# Patient Record
Sex: FEMALE | Race: WHITE | Employment: UNEMPLOYED | URBAN - METROPOLITAN AREA
[De-identification: names, ages, dates, MRNs, and addresses within clinical notes are randomized per-mention and may not be internally consistent; named-entity substitution may affect disease eponyms.]

---

## 2017-05-02 ENCOUNTER — HOSPITAL ENCOUNTER (OUTPATIENT)
Dept: OTHER | Age: 72
Discharge: HOME | End: 2017-05-02
Attending: FAMILY MEDICINE | Admitting: FAMILY MEDICINE

## 2017-05-02 VITALS
BODY MASS INDEX: 22.19 KG/M2 | HEIGHT: 60 IN | TEMPERATURE: 97 F | RESPIRATION RATE: 16 BRPM | DIASTOLIC BLOOD PRESSURE: 99 MMHG | HEART RATE: 67 BPM | OXYGEN SATURATION: 98 % | WEIGHT: 113 LBS | SYSTOLIC BLOOD PRESSURE: 236 MMHG

## 2017-05-02 LAB
A/G RATIO: 1.8 (ref 1.1–2.2)
ALBUMIN SERPL-MCNC: 4.4 G/DL (ref 3.4–5)
ALP BLD-CCNC: 132 U/L (ref 40–129)
ALT SERPL-CCNC: 10 U/L (ref 10–40)
ANION GAP SERPL CALCULATED.3IONS-SCNC: 12 MMOL/L (ref 3–16)
AST SERPL-CCNC: 18 U/L (ref 15–37)
BILIRUB SERPL-MCNC: 0.8 MG/DL (ref 0–1)
BUN BLDV-MCNC: 9 MG/DL (ref 7–20)
CALCIUM SERPL-MCNC: 9.2 MG/DL (ref 8.3–10.6)
CHLORIDE BLD-SCNC: 101 MMOL/L (ref 99–110)
CO2: 30 MMOL/L (ref 21–32)
CREAT SERPL-MCNC: 0.8 MG/DL (ref 0.6–1.2)
GFR AFRICAN AMERICAN: >60
GFR NON-AFRICAN AMERICAN: >60
GLOBULIN: 2.4 G/DL
GLUCOSE BLD-MCNC: 112 MG/DL (ref 70–99)
POTASSIUM SERPL-SCNC: 3.5 MMOL/L (ref 3.5–5.1)
SODIUM BLD-SCNC: 143 MMOL/L (ref 136–145)
TOTAL PROTEIN: 6.8 G/DL (ref 6.4–8.2)

## 2017-05-02 ASSESSMENT — PAIN - FUNCTIONAL ASSESSMENT: PAIN_FUNCTIONAL_ASSESSMENT: 0-10

## 2017-08-15 ENCOUNTER — HOSPITAL ENCOUNTER (OUTPATIENT)
Dept: OTHER | Age: 72
Discharge: HOME | End: 2017-08-15
Attending: INTERNAL MEDICINE | Admitting: INTERNAL MEDICINE

## 2017-08-15 VITALS — HEIGHT: 60 IN | WEIGHT: 112 LBS | BODY MASS INDEX: 21.99 KG/M2

## 2017-08-15 DIAGNOSIS — C82.18 FOLLICULAR LYMPHOMA GRADE II OF LYMPH NODES OF MULTIPLE SITES (HCC): ICD-10-CM

## 2017-08-15 RX ORDER — FLUDEOXYGLUCOSE F 18 200 MCI/ML
15.65 INJECTION, SOLUTION INTRAVENOUS
Status: COMPLETED | OUTPATIENT
Start: 2017-08-15 | End: 2017-08-15

## 2017-08-15 RX ADMIN — FLUDEOXYGLUCOSE F 18 15.65 MILLICURIE: 200 INJECTION, SOLUTION INTRAVENOUS at 11:00

## 2018-03-27 ENCOUNTER — HOSPITAL ENCOUNTER (OUTPATIENT)
Dept: OTHER | Age: 73
Discharge: HOME | End: 2018-03-27
Attending: FAMILY MEDICINE | Admitting: FAMILY MEDICINE

## 2018-03-27 VITALS
BODY MASS INDEX: 22.19 KG/M2 | SYSTOLIC BLOOD PRESSURE: 203 MMHG | RESPIRATION RATE: 16 BRPM | WEIGHT: 113 LBS | DIASTOLIC BLOOD PRESSURE: 100 MMHG | HEART RATE: 66 BPM | HEIGHT: 60 IN

## 2018-03-27 LAB
FOLATE: >20 NG/ML (ref 4.78–24.2)
IRON SATURATION: 38 % (ref 15–50)
IRON: 92 UG/DL (ref 37–145)
TOTAL IRON BINDING CAPACITY: 240 UG/DL (ref 260–445)
TSH SERPL DL<=0.05 MIU/L-ACNC: 1.81 UIU/ML (ref 0.27–4.2)
VITAMIN B-12: 308 PG/ML (ref 211–911)
VITAMIN D 25-HYDROXY: 15.7 NG/ML

## 2018-03-27 ASSESSMENT — PAIN SCALES - GENERAL: PAINLEVEL_OUTOF10: 0

## 2018-03-27 NOTE — PROGRESS NOTES
Pt here for blood draw from portacath No c/o's voiced Portacath left chest accessed with 20 ga 1 in phelps needle Excellent blood return obtained 10 cc blood wasted then blood for lab work drawn [employer] flushed with 20 cc NS using start stop method then port de accessed Dressing applied to site Site unremarkable Pt iker well  Discharge instructions reviewed with pt and understanding verbalized then pt discharged to home in stable condition

## 2018-12-05 ENCOUNTER — HOSPITAL ENCOUNTER (OUTPATIENT)
Age: 73
Discharge: HOME | End: 2018-12-05
Payer: MEDICARE

## 2018-12-05 LAB
A/G RATIO: 1.3 (ref 1.1–2.2)
ALBUMIN SERPL-MCNC: 4 G/DL (ref 3.4–5)
ALP BLD-CCNC: 112 U/L (ref 40–129)
ALT SERPL-CCNC: 10 U/L (ref 10–40)
ANION GAP SERPL CALCULATED.3IONS-SCNC: 13 MMOL/L (ref 3–16)
AST SERPL-CCNC: 17 U/L (ref 15–37)
BASOPHILS ABSOLUTE: 0 K/UL (ref 0–0.2)
BASOPHILS RELATIVE PERCENT: 0.6 %
BILIRUB SERPL-MCNC: 0.5 MG/DL (ref 0–1)
BUN BLDV-MCNC: 10 MG/DL (ref 7–20)
CALCIUM SERPL-MCNC: 9 MG/DL (ref 8.3–10.6)
CHLORIDE BLD-SCNC: 103 MMOL/L (ref 99–110)
CO2: 28 MMOL/L (ref 21–32)
CREAT SERPL-MCNC: 0.9 MG/DL (ref 0.6–1.2)
EOSINOPHILS ABSOLUTE: 0.1 K/UL (ref 0–0.6)
EOSINOPHILS RELATIVE PERCENT: 2.6 %
GFR AFRICAN AMERICAN: >60
GFR NON-AFRICAN AMERICAN: >60
GLOBULIN: 3 G/DL
GLUCOSE BLD-MCNC: 90 MG/DL (ref 70–99)
HCT VFR BLD CALC: 38.8 % (ref 36–48)
HEMOGLOBIN: 13.4 G/DL (ref 12–16)
LYMPHOCYTES ABSOLUTE: 1.2 K/UL (ref 1–5.1)
LYMPHOCYTES RELATIVE PERCENT: 23.4 %
MCH RBC QN AUTO: 32.7 PG (ref 26–34)
MCHC RBC AUTO-ENTMCNC: 34.5 G/DL (ref 31–36)
MCV RBC AUTO: 94.8 FL (ref 80–100)
MONOCYTES ABSOLUTE: 0.5 K/UL (ref 0–1.3)
MONOCYTES RELATIVE PERCENT: 10 %
NEUTROPHILS ABSOLUTE: 3.4 K/UL (ref 1.7–7.7)
NEUTROPHILS RELATIVE PERCENT: 63.4 %
PDW BLD-RTO: 13.3 % (ref 12.4–15.4)
PLATELET # BLD: 113 K/UL (ref 135–450)
PMV BLD AUTO: 9.7 FL (ref 5–10.5)
POTASSIUM SERPL-SCNC: 3.3 MMOL/L (ref 3.5–5.1)
RBC # BLD: 4.09 M/UL (ref 4–5.2)
SODIUM BLD-SCNC: 144 MMOL/L (ref 136–145)
TOTAL PROTEIN: 7 G/DL (ref 6.4–8.2)
WBC # BLD: 5.3 K/UL (ref 4–11)

## 2018-12-05 PROCEDURE — 36415 COLL VENOUS BLD VENIPUNCTURE: CPT

## 2018-12-05 PROCEDURE — 84443 ASSAY THYROID STIM HORMONE: CPT

## 2018-12-05 PROCEDURE — 85025 COMPLETE CBC W/AUTO DIFF WBC: CPT

## 2018-12-05 PROCEDURE — 80053 COMPREHEN METABOLIC PANEL: CPT

## 2018-12-06 LAB — TSH SERPL DL<=0.05 MIU/L-ACNC: 2.06 UIU/ML (ref 0.27–4.2)

## 2018-12-17 ENCOUNTER — APPOINTMENT (OUTPATIENT)
Dept: CT IMAGING | Age: 73
End: 2018-12-17
Payer: MEDICARE

## 2018-12-17 ENCOUNTER — APPOINTMENT (OUTPATIENT)
Dept: GENERAL RADIOLOGY | Age: 73
End: 2018-12-17
Payer: MEDICARE

## 2018-12-17 ENCOUNTER — HOSPITAL ENCOUNTER (EMERGENCY)
Age: 73
Discharge: HOME | End: 2018-12-17
Payer: MEDICARE

## 2018-12-17 VITALS
WEIGHT: 114 LBS | DIASTOLIC BLOOD PRESSURE: 107 MMHG | TEMPERATURE: 98.6 F | HEART RATE: 76 BPM | SYSTOLIC BLOOD PRESSURE: 228 MMHG | RESPIRATION RATE: 16 BRPM | OXYGEN SATURATION: 97 % | HEIGHT: 60 IN | BODY MASS INDEX: 22.38 KG/M2

## 2018-12-17 DIAGNOSIS — S80.01XA CONTUSION OF RIGHT KNEE, INITIAL ENCOUNTER: ICD-10-CM

## 2018-12-17 DIAGNOSIS — S09.90XA INJURY OF HEAD, INITIAL ENCOUNTER: ICD-10-CM

## 2018-12-17 DIAGNOSIS — S00.83XA CONTUSION OF FACE, INITIAL ENCOUNTER: ICD-10-CM

## 2018-12-17 DIAGNOSIS — I10 CHRONIC HYPERTENSION: ICD-10-CM

## 2018-12-17 DIAGNOSIS — S02.2XXA CLOSED FRACTURE OF NASAL BONE, INITIAL ENCOUNTER: ICD-10-CM
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
## 2018-12-17 DIAGNOSIS — S60.221A CONTUSION OF RIGHT HAND, INITIAL ENCOUNTER: ICD-10-CM

## 2018-12-17 PROCEDURE — 73130 X-RAY EXAM OF HAND: CPT

## 2018-12-17 PROCEDURE — 99284 EMERGENCY DEPT VISIT MOD MDM: CPT

## 2018-12-17 PROCEDURE — 70486 CT MAXILLOFACIAL W/O DYE: CPT

## 2018-12-17 PROCEDURE — 73560 X-RAY EXAM OF KNEE 1 OR 2: CPT

## 2018-12-17 PROCEDURE — 72125 CT NECK SPINE W/O DYE: CPT

## 2018-12-17 PROCEDURE — 70450 CT HEAD/BRAIN W/O DYE: CPT

## 2018-12-17 RX ORDER — ACETAMINOPHEN 325 MG/1
650 TABLET ORAL ONCE
Status: DISCONTINUED | OUTPATIENT
Start: 2018-12-17 | End: 2018-12-17 | Stop reason: HOSPADM

## 2018-12-17 ASSESSMENT — PAIN DESCRIPTION - LOCATION: LOCATION: WRIST

## 2018-12-17 ASSESSMENT — PAIN SCALES - GENERAL
PAINLEVEL_OUTOF10: 6
PAINLEVEL_OUTOF10: 6

## 2018-12-17 ASSESSMENT — ENCOUNTER SYMPTOMS
ABDOMINAL PAIN: 0
BACK PAIN: 0
SHORTNESS OF BREATH: 0
VOMITING: 0
NAUSEA: 0
FACIAL SWELLING: 1

## 2018-12-17 ASSESSMENT — PAIN DESCRIPTION - PROGRESSION: CLINICAL_PROGRESSION: NOT CHANGED

## 2018-12-17 ASSESSMENT — PAIN DESCRIPTION - DESCRIPTORS: DESCRIPTORS: CONSTANT

## 2018-12-17 ASSESSMENT — PAIN DESCRIPTION - ONSET: ONSET: ON-GOING

## 2018-12-17 ASSESSMENT — PAIN DESCRIPTION - FREQUENCY: FREQUENCY: CONTINUOUS

## 2018-12-17 ASSESSMENT — PAIN DESCRIPTION - PAIN TYPE: TYPE: ACUTE PAIN

## 2018-12-17 ASSESSMENT — PAIN DESCRIPTION - ORIENTATION: ORIENTATION: RIGHT

## 2018-12-17 NOTE — ED PROVIDER NOTES
Vitals [12/17/18 1418]   BP Temp Temp Source Pulse Resp SpO2 Height Weight   (!) 220/104 97.8 °F (36.6 °C) Oral 66 16 98 % 5' (1.524 m) 114 lb (51.7 kg)       Physical Exam    CONSTITUTIONAL: Awake and alert. Cooperative. Well-developed. Well-nourished. Non-toxic. No acute distress. HENT: Normocephalic. Periorbital bruising and some bruising across the bridge of the nose with slight tenderness to palpate. No bony step-off or crepitus. External ears normal, without discharge. TMs clear bilaterally. No hemotympanum No nasal discharge. Slight dry blood in right nare. No septal hematoma. Oropharynx clear. Mucous membranes moist.  EYES: Conjunctiva non-injected. No scleral icterus. PERRL. EOM's grossly intact. NECK: Supple. Normal ROM. Mild pain to the paravertebral muscles of the C-spine. CARDIOVASCULAR: RRR. No Murmer. Intact distal pulses. PULMONARY/CHEST WALL:Effort normal. No tachypnea. Lungs clear to ausculation. ABDOMEN: Normal BS. Soft. Nondistended. No tenderness to palpate. No guarding. /ANORECTAL: Not assessed  MUSKULOSKELETAL: Right hand: Mild swelling of the dorsum of the hand over the third, fourth and fifth carpals with associated tenderness to palpate and some bruising. Normal ROM. No acute deformities. No edema. Right knee: Mild tenderness anteriorly. No significant swelling or joint effusion. Good ROM. No acute deformities. SKIN: Warm and dry. NEUROLOGICAL: Alert and oriented x 3. GCS 15. CN II-XII grossly intact. Strength is 5/5 in all extremities and sensation is intact. Normal gait. PSYCHIATRIC: Normal affect        DIAGNOSTICRESULTS:       RADIOLOGY:  All x-ray studies areviewed/reviewed by me.   Formal interpretations per the radiologist are as follows:      Xr Hand Right (min 3 Views)    Result Date: 12/17/2018  EXAMINATION: 3 XRAY VIEWS OF THE RIGHT HAND; 2 XRAY VIEWS OF THE RIGHT KNEE 12/17/2018 4:15 pm COMPARISON: Right knee plain radiographs from 05/29/2016 HISTORY: surrounding the nose with suspected left nasal bone fracture. Infraorbital soft tissue swelling. No acute orbital fracture. Ct Facial Bones Wo Contrast    Result Date: 12/17/2018  EXAMINATION: CT OF THE HEAD WITHOUT CONTRAST; CT OF THE CERVICAL SPINE WITHOUT CONTRAST; CT OF THE FACE WITHOUT CONTRAST  12/17/2018 4:56 pm; 12/17/2018 4:57 pm TECHNIQUE: CT of the head was performed without the administration of intravenous contrast. Dose modulation, iterative reconstruction, and/or weight based adjustment of the mA/kV was utilized to reduce the radiation dose to as low as reasonably achievable.; CT of the cervical spine was performed without the administration of intravenous contrast. Multiplanar reformatted images are provided for review. Dose modulation, iterative reconstruction, and/or weight based adjustment of the mA/kV was utilized to reduce the radiation dose to as low as reasonably achievable.; CT of the face was performed without the administration of intravenous contrast. Multiplanar reformatted images are provided for review. Dose modulation, iterative reconstruction, and/or weight based adjustment of the mA/kV was utilized to reduce the radiation dose to as low as reasonably achievable. COMPARISON: CT head and cervical spine May 29, 2016. HISTORY: ORDERING SYSTEM PROVIDED HISTORY: fall, head injury TECHNOLOGIST PROVIDED HISTORY: Has a \"code stroke\" or \"stroke alert\" been called? ->No Ordering Physician Provided Reason for Exam: fall, pt fell face first, no LOC, headache Acuity: Acute Type of Exam: Initial; ORDERING SYSTEM PROVIDED HISTORY: fall, facial injury TECHNOLOGIST PROVIDED HISTORY: Ordering Physician Provided Reason for Exam: FALL - LACERATION TO BRIDGE OF NOSE FROM GLASSES, BILATERAL BRUISED EYES Acuity: Acute Type of Exam: Initial FINDINGS: CT HEAD: BRAIN/VENTRICLES: No acute intracranial hemorrhage. No mass effect. No midline shift.   Moderate prominence of the ventricles and sulci is of intravenous contrast. Multiplanar reformatted images are provided for review. Dose modulation, iterative reconstruction, and/or weight based adjustment of the mA/kV was utilized to reduce the radiation dose to as low as reasonably achievable.; CT of the face was performed without the administration of intravenous contrast. Multiplanar reformatted images are provided for review. Dose modulation, iterative reconstruction, and/or weight based adjustment of the mA/kV was utilized to reduce the radiation dose to as low as reasonably achievable. COMPARISON: CT head and cervical spine May 29, 2016. HISTORY: ORDERING SYSTEM PROVIDED HISTORY: fall, head injury TECHNOLOGIST PROVIDED HISTORY: Has a \"code stroke\" or \"stroke alert\" been called? ->No Ordering Physician Provided Reason for Exam: fall, pt fell face first, no LOC, headache Acuity: Acute Type of Exam: Initial; ORDERING SYSTEM PROVIDED HISTORY: fall, facial injury TECHNOLOGIST PROVIDED HISTORY: Ordering Physician Provided Reason for Exam: FALL - LACERATION TO BRIDGE OF NOSE FROM GLASSES, BILATERAL BRUISED EYES Acuity: Acute Type of Exam: Initial FINDINGS: CT HEAD: BRAIN/VENTRICLES: No acute intracranial hemorrhage. No mass effect. No midline shift. Moderate prominence of the ventricles and sulci is consistent with atrophy. Moderate periventricular and subcortical white matter hypodensities are nonspecific but likely represent microvascular disease. SOFT TISSUES/SKULL: No acute abnormality of the visualized skull or soft tissues. CT FACIAL BONES: FACIAL BONES: Mandible is intact. Mandibular condyles are appropriately located. No acute zygomatic arch fracture. Pterygoid plates are intact. No acute maxillary fracture. Left-sided nasal bone fracture is suspected. Associated soft tissue swelling is seen. ORBITS: The globes appear intact. The extraocular muscles, optic nerve sheath complexes and lacrimal glands appear unremarkable.   No retrobulbar hematoma or

## 2019-01-22 ENCOUNTER — HOSPITAL ENCOUNTER (OUTPATIENT)
Dept: PHYSICAL THERAPY | Age: 74
Setting detail: THERAPIES SERIES
Discharge: HOME | End: 2019-01-22
Payer: MEDICARE

## 2019-01-22 PROCEDURE — 97162 PT EVAL MOD COMPLEX 30 MIN: CPT

## 2019-01-22 PROCEDURE — 97112 NEUROMUSCULAR REEDUCATION: CPT

## 2019-01-23 ENCOUNTER — HOSPITAL ENCOUNTER (OUTPATIENT)
Dept: PHYSICAL THERAPY | Age: 74
Setting detail: THERAPIES SERIES
Discharge: HOME | End: 2019-01-23
Payer: MEDICARE

## 2019-01-25 ENCOUNTER — APPOINTMENT (OUTPATIENT)
Dept: PHYSICAL THERAPY | Age: 74
End: 2019-01-25
Payer: MEDICARE

## 2019-01-29 ENCOUNTER — APPOINTMENT (OUTPATIENT)
Dept: PHYSICAL THERAPY | Age: 74
End: 2019-01-29
Payer: MEDICARE

## 2019-04-01 ENCOUNTER — APPOINTMENT (OUTPATIENT)
Dept: GENERAL RADIOLOGY | Age: 74
DRG: 305 | End: 2019-04-01
Payer: MEDICARE

## 2019-04-01 ENCOUNTER — HOSPITAL ENCOUNTER (INPATIENT)
Age: 74
LOS: 2 days | Discharge: HOME | DRG: 305 | End: 2019-04-03
Attending: EMERGENCY MEDICINE | Admitting: FAMILY MEDICINE
Payer: MEDICARE

## 2019-04-01 DIAGNOSIS — R07.9 CHEST PAIN, UNSPECIFIED TYPE: Primary | ICD-10-CM

## 2019-04-01 LAB
A/G RATIO: 1.4 (ref 1.1–2.2)
ALBUMIN SERPL-MCNC: 3.6 G/DL (ref 3.4–5)
ALP BLD-CCNC: 109 U/L (ref 40–129)
ALT SERPL-CCNC: 9 U/L (ref 10–40)
ANION GAP SERPL CALCULATED.3IONS-SCNC: 11 MMOL/L (ref 3–16)
AST SERPL-CCNC: 19 U/L (ref 15–37)
BASOPHILS ABSOLUTE: 0.1 K/UL (ref 0–0.2)
BASOPHILS RELATIVE PERCENT: 1.2 %
BILIRUB SERPL-MCNC: 0.5 MG/DL (ref 0–1)
BUN BLDV-MCNC: 12 MG/DL (ref 7–20)
CALCIUM SERPL-MCNC: 8.3 MG/DL (ref 8.3–10.6)
CHLORIDE BLD-SCNC: 106 MMOL/L (ref 99–110)
CO2: 25 MMOL/L (ref 21–32)
CREAT SERPL-MCNC: 0.8 MG/DL (ref 0.6–1.2)
EKG ATRIAL RATE: 64 BPM
EKG DIAGNOSIS: NORMAL
EKG P AXIS: 51 DEGREES
EKG P-R INTERVAL: 198 MS
EKG Q-T INTERVAL: 456 MS
EKG QRS DURATION: 94 MS
EKG QTC CALCULATION (BAZETT): 470 MS
EKG R AXIS: -2 DEGREES
EKG T AXIS: 101 DEGREES
EKG VENTRICULAR RATE: 64 BPM
EOSINOPHILS ABSOLUTE: 0.1 K/UL (ref 0–0.6)
EOSINOPHILS RELATIVE PERCENT: 2.8 %
GFR AFRICAN AMERICAN: >60
GFR NON-AFRICAN AMERICAN: >60
GLOBULIN: 2.5 G/DL
GLUCOSE BLD-MCNC: 125 MG/DL (ref 70–99)
HCT VFR BLD CALC: 38.5 % (ref 36–48)
HEMOGLOBIN: 13.2 G/DL (ref 12–16)
LV EF: 58 %
LVEF MODALITY: NORMAL
LYMPHOCYTES ABSOLUTE: 1.2 K/UL (ref 1–5.1)
LYMPHOCYTES RELATIVE PERCENT: 27.2 %
MCH RBC QN AUTO: 32.4 PG (ref 26–34)
MCHC RBC AUTO-ENTMCNC: 34.3 G/DL (ref 31–36)
MCV RBC AUTO: 94.4 FL (ref 80–100)
MONOCYTES ABSOLUTE: 0.4 K/UL (ref 0–1.3)
MONOCYTES RELATIVE PERCENT: 10.3 %
NEUTROPHILS ABSOLUTE: 2.5 K/UL (ref 1.7–7.7)
NEUTROPHILS RELATIVE PERCENT: 58.5 %
PDW BLD-RTO: 13.8 % (ref 12.4–15.4)
PLATELET # BLD: 112 K/UL (ref 135–450)
PMV BLD AUTO: 9.6 FL (ref 5–10.5)
POTASSIUM SERPL-SCNC: 3.3 MMOL/L (ref 3.5–5.1)
PRO-BNP: 1371 PG/ML (ref 0–449)
RBC # BLD: 4.07 M/UL (ref 4–5.2)
SODIUM BLD-SCNC: 142 MMOL/L (ref 136–145)
TOTAL PROTEIN: 6.1 G/DL (ref 6.4–8.2)
TROPONIN: <0.01 NG/ML
TROPONIN: <0.01 NG/ML
WBC # BLD: 4.2 K/UL (ref 4–11)

## 2019-04-01 PROCEDURE — 71045 X-RAY EXAM CHEST 1 VIEW: CPT

## 2019-04-01 PROCEDURE — 36592 COLLECT BLOOD FROM PICC: CPT

## 2019-04-01 PROCEDURE — 83880 ASSAY OF NATRIURETIC PEPTIDE: CPT

## 2019-04-01 PROCEDURE — 6370000000 HC RX 637 (ALT 250 FOR IP): Performed by: STUDENT IN AN ORGANIZED HEALTH CARE EDUCATION/TRAINING PROGRAM

## 2019-04-01 PROCEDURE — 99291 CRITICAL CARE FIRST HOUR: CPT

## 2019-04-01 PROCEDURE — 85025 COMPLETE CBC W/AUTO DIFF WBC: CPT

## 2019-04-01 PROCEDURE — 80053 COMPREHEN METABOLIC PANEL: CPT

## 2019-04-01 PROCEDURE — 2580000003 HC RX 258: Performed by: FAMILY MEDICINE

## 2019-04-01 PROCEDURE — 6370000000 HC RX 637 (ALT 250 FOR IP): Performed by: FAMILY MEDICINE

## 2019-04-01 PROCEDURE — 36591 DRAW BLOOD OFF VENOUS DEVICE: CPT

## 2019-04-01 PROCEDURE — 99222 1ST HOSP IP/OBS MODERATE 55: CPT | Performed by: INTERNAL MEDICINE

## 2019-04-01 PROCEDURE — 93005 ELECTROCARDIOGRAM TRACING: CPT | Performed by: EMERGENCY MEDICINE

## 2019-04-01 PROCEDURE — 6370000000 HC RX 637 (ALT 250 FOR IP): Performed by: INTERNAL MEDICINE

## 2019-04-01 PROCEDURE — 93306 TTE W/DOPPLER COMPLETE: CPT

## 2019-04-01 PROCEDURE — 84484 ASSAY OF TROPONIN QUANT: CPT

## 2019-04-01 PROCEDURE — 93010 ELECTROCARDIOGRAM REPORT: CPT | Performed by: INTERNAL MEDICINE

## 2019-04-01 PROCEDURE — 1200000000 HC SEMI PRIVATE

## 2019-04-01 RX ORDER — PANTOPRAZOLE SODIUM 40 MG/1
40 TABLET, DELAYED RELEASE ORAL
Status: DISCONTINUED | OUTPATIENT
Start: 2019-04-01 | End: 2019-04-03 | Stop reason: HOSPADM

## 2019-04-01 RX ORDER — TIMOLOL MALEATE 5 MG/ML
1 SOLUTION/ DROPS OPHTHALMIC 2 TIMES DAILY
Status: DISCONTINUED | OUTPATIENT
Start: 2019-04-01 | End: 2019-04-03 | Stop reason: HOSPADM

## 2019-04-01 RX ORDER — MINOXIDIL 2.5 MG/1
2.5 TABLET ORAL 2 TIMES DAILY
Status: DISCONTINUED | OUTPATIENT
Start: 2019-04-01 | End: 2019-04-03 | Stop reason: HOSPADM

## 2019-04-01 RX ORDER — MECLIZINE HCL 12.5 MG/1
25 TABLET ORAL 3 TIMES DAILY PRN
Status: DISCONTINUED | OUTPATIENT
Start: 2019-04-01 | End: 2019-04-03 | Stop reason: HOSPADM

## 2019-04-01 RX ORDER — ASPIRIN 81 MG/1
81 TABLET, CHEWABLE ORAL DAILY
Status: DISCONTINUED | OUTPATIENT
Start: 2019-04-02 | End: 2019-04-03 | Stop reason: HOSPADM

## 2019-04-01 RX ORDER — NITROGLYCERIN 0.4 MG/1
0.4 TABLET SUBLINGUAL EVERY 5 MIN PRN
Status: DISCONTINUED | OUTPATIENT
Start: 2019-04-01 | End: 2019-04-03 | Stop reason: HOSPADM

## 2019-04-01 RX ORDER — POTASSIUM CHLORIDE 20 MEQ/1
40 TABLET, EXTENDED RELEASE ORAL ONCE
Status: DISCONTINUED | OUTPATIENT
Start: 2019-04-01 | End: 2019-04-03 | Stop reason: HOSPADM

## 2019-04-01 RX ORDER — ACETAMINOPHEN 325 MG/1
650 TABLET ORAL EVERY 4 HOURS PRN
Status: DISCONTINUED | OUTPATIENT
Start: 2019-04-01 | End: 2019-04-03 | Stop reason: HOSPADM

## 2019-04-01 RX ORDER — LOSARTAN POTASSIUM 25 MG/1
100 TABLET ORAL DAILY
Status: DISCONTINUED | OUTPATIENT
Start: 2019-04-01 | End: 2019-04-03 | Stop reason: HOSPADM

## 2019-04-01 RX ORDER — SODIUM CHLORIDE 0.9 % (FLUSH) 0.9 %
10 SYRINGE (ML) INJECTION PRN
Status: DISCONTINUED | OUTPATIENT
Start: 2019-04-01 | End: 2019-04-03 | Stop reason: HOSPADM

## 2019-04-01 RX ORDER — FLECAINIDE ACETATE 100 MG/1
50 TABLET ORAL 2 TIMES DAILY
Status: DISCONTINUED | OUTPATIENT
Start: 2019-04-01 | End: 2019-04-01

## 2019-04-01 RX ORDER — ONDANSETRON HYDROCHLORIDE 8 MG/1
4 TABLET, FILM COATED ORAL EVERY 8 HOURS PRN
Status: DISCONTINUED | OUTPATIENT
Start: 2019-04-01 | End: 2019-04-02

## 2019-04-01 RX ORDER — FLECAINIDE ACETATE 50 MG/1
75 TABLET ORAL 2 TIMES DAILY
Status: DISCONTINUED | OUTPATIENT
Start: 2019-04-01 | End: 2019-04-03 | Stop reason: HOSPADM

## 2019-04-01 RX ORDER — METOPROLOL SUCCINATE 25 MG/1
25 TABLET, EXTENDED RELEASE ORAL DAILY
Status: DISCONTINUED | OUTPATIENT
Start: 2019-04-01 | End: 2019-04-03 | Stop reason: HOSPADM

## 2019-04-01 RX ORDER — AMITRIPTYLINE HYDROCHLORIDE 10 MG/1
10 TABLET, FILM COATED ORAL NIGHTLY
Status: DISCONTINUED | OUTPATIENT
Start: 2019-04-01 | End: 2019-04-03 | Stop reason: HOSPADM

## 2019-04-01 RX ORDER — MEXILETINE HYDROCHLORIDE 150 MG/1
150 CAPSULE ORAL 3 TIMES DAILY
Status: DISCONTINUED | OUTPATIENT
Start: 2019-04-01 | End: 2019-04-02

## 2019-04-01 RX ORDER — SODIUM CHLORIDE 0.9 % (FLUSH) 0.9 %
10 SYRINGE (ML) INJECTION EVERY 12 HOURS SCHEDULED
Status: DISCONTINUED | OUTPATIENT
Start: 2019-04-01 | End: 2019-04-03 | Stop reason: HOSPADM

## 2019-04-01 RX ADMIN — FLECAINIDE ACETATE 75 MG: 50 TABLET ORAL at 21:50

## 2019-04-01 RX ADMIN — MINOXIDIL 2.5 MG: 2.5 TABLET ORAL at 21:54

## 2019-04-01 RX ADMIN — SODIUM CHLORIDE, PRESERVATIVE FREE 10 ML: 5 INJECTION INTRAVENOUS at 08:11

## 2019-04-01 RX ADMIN — FLECAINIDE ACETATE 50 MG: 100 TABLET ORAL at 08:10

## 2019-04-01 RX ADMIN — ACETAMINOPHEN 650 MG: 325 TABLET ORAL at 21:51

## 2019-04-01 RX ADMIN — MINOXIDIL 2.5 MG: 2.5 TABLET ORAL at 10:07

## 2019-04-01 RX ADMIN — LOSARTAN POTASSIUM 100 MG: 25 TABLET, FILM COATED ORAL at 08:10

## 2019-04-01 RX ADMIN — MEXILETINE HYDROCHLORIDE 150 MG: 150 CAPSULE ORAL at 21:51

## 2019-04-01 RX ADMIN — AMITRIPTYLINE HYDROCHLORIDE 10 MG: 10 TABLET, FILM COATED ORAL at 21:51

## 2019-04-01 RX ADMIN — PANTOPRAZOLE SODIUM 40 MG: 40 TABLET, DELAYED RELEASE ORAL at 08:10

## 2019-04-01 RX ADMIN — METOPROLOL SUCCINATE 25 MG: 25 TABLET, EXTENDED RELEASE ORAL at 08:10

## 2019-04-01 RX ADMIN — SODIUM CHLORIDE, PRESERVATIVE FREE 10 ML: 5 INJECTION INTRAVENOUS at 21:51

## 2019-04-01 ASSESSMENT — PAIN SCALES - GENERAL
PAINLEVEL_OUTOF10: 0
PAINLEVEL_OUTOF10: 6

## 2019-04-01 NOTE — ED TRIAGE NOTES
Pt c/o cp and SOB which started today at [employer], was trying to sing and felt like she couldn't get a breath. She felt it was her A Fib. Happened intermittently throughout day  And developed CP later in the evening which is shy she decided to come to ED., Hx afib.

## 2019-04-01 NOTE — PROGRESS NOTES
Patient admitted to room 351-1 from ED. Patient oriented to room, call light, bed rails, phone, lights and bathroom. Patient instructed about the schedule of the day including: vital sign frequency, lab draws, possible tests, frequency of MD and staff rounds, including RN/MD rounding together at bedside, daily weights, and I &O's. Patient instructed about prescribed diet, how to use 8MENU, and television. Bed frame alarm in place, patient aware of placement and reason. Telemetry box 7 in place, patient aware of placement and reason. Bed locked, in lowest position, side rails up 2/4, call light within reach. Will continue to monitor.

## 2019-04-01 NOTE — CARE COORDINATION
CASE MANAGEMENT INITIAL ASSESSMENT      Reviewed chart and met with patient today, re: Possible discharge needs. Explained Case Management role/services. yes    Family present:   Primary contact information: Tejal Kolb [phone]    Admit date/status: 4/1/19 IP  Diagnosis: Chest Pain    Insurance: 200 LeTV required for SNF - Y        3 night stay required - N    Living arrangements, Adls, care needs, prior to admission: lives in a one story house with  and drives. Independent in ADL's. Transportation: pt drives    [address] [place] [address] at home: Walker_X_Cane_X_RTS__ BSC__Shower Chair__  02__ HHN__ CPAP__  BiPap__  Hospital Bed__ W/C___ Other__________    Services in the home and/or outpatient, prior to admission: none    Dialysis Facility (if applicable) N/A  · Name:  · Address:  · Dialysis Schedule:  · Phone:  · Fax:    PT/OT recs: none seen    Hospital Exemption Notification (HEN): needed for snf, not initiated    Barriers to discharge: none    Plan/comments: Pt plans on discharging home with no needs from case management. If any needs should arise please notify.     ECOC on chart for MD signature

## 2019-04-01 NOTE — CONSULTS
[address] [address]                                  CONSULTATION    PATIENT NAME: Tiffany Pyle                      :        1945  MED REC NO:   [id number]                          ROOM:       0105  ACCOUNT NO:   [de-identified]                           ADMIT DATE: 2019  PROVIDER:     Earline Nageotte, MD    CONSULT DATE:  2019    REASON FOR CONSULTATION:  Chest pain, palpitations, hypertension. HISTORY OF PRESENT ILLNESS:  This 57-year-old female presented to the  hospital with complaints of chest pain. She was at rest when she had  the onset of chest pain, felt like a pressure sensation in the center of  her chest, radiated up into her left shoulder, arm and neck, lasted for  about an hour or so. It was not exertional, got worse when laid down  flat. She had some mild associated shortness of breath. Pain is now  gone. She has not had this before. PAST MEDICAL HISTORY:  1. Hypertension. She states her blood pressure has always been very  difficult to control. It is typical for her to have systolic blood  pressure over 200. She states she has been on multiple blood pressure  medicines, which she either is unable to tolerate or do not  significantly lower her blood pressure. In fact, she tells me that at  one point, she spent a week in the hospital with attempts to control her  blood pressure, which were unsuccessful. She states that her primary  care physician and primary cardiologist have told her that her blood  pressure is simply always going to be that high. 2.  Paroxysmal atrial fibrillation, for which she is on flecainide, but  no anticoagulation due to a high risk of bleed per the patient and her  cardiologist, Dr. Logan Galindo. SOCIAL HISTORY:  She is . Does not smoke. FAMILY HISTORY:  Positive for heart disease. REVIEW OF SYSTEMS:  She denies fevers, chills, cough.   No focal  neurologic symptoms. No headache. No visual changes. No recent GI or   bleeding. She complains of increased palpitations over the past  several days. She states it is consistent with her atrial fibrillation;  this is out of the ordinary for her. All other systems are negative  except as present illness. ALLERGIES:  See list in the chart, which I reviewed. MEDICATIONS:  See list in the chart, which I reviewed. PHYSICAL EXAMINATION:  VITAL SIGNS:  Blood pressure is 192/79, heart rate 62, respirations 15,  temperature 97.9. She is 98% saturated on room air. GENERAL:  Well-developed, well-nourished, white female in no acute  distress. HEENT:  Normocephalic, atraumatic. Oropharynx clear. Moist mucous  membranes. NECK:  Supple. CHEST:  Clear. CARDIAC:  Regular S1, S2. There is no S3 or S4 gallop. There is no  significant murmur. Jugular venous pressure is normal.  Carotids 2+,  symmetric, without bruit. ABDOMEN:  Soft, nontender. Positive bowel sounds. EXTREMITIES:  No cyanosis or edema. NEUROLOGIC:  Grossly nonfocal.  SKIN:  Warm, dry. PSYCHIATRIC:  Affect calm. DIAGNOSTIC DATA:  EKG:  Normal sinus rhythm. No acute ischemia or  injury pattern. Chest x-ray does not show pulmonary edema. LABORATORY DATA:  Troponin is less than 0.01. IMPRESSION:  1. Chest pain with both typical and atypical features. No objective  evidence of acute coronary syndrome. 2.  Hypertension, poorly controlled. The patient states this is her  baseline, see above for details. 3.  Paroxysmal atrial fibrillation, appears to be having increased  episodes. 4.  Palpitations due to paroxysmal atrial fibrillation. PLAN:  1. Increase flecainide to 75 mg twice daily. 2.  Lexiscan Myoview stress test.  3.  Echocardiogram.  4.  No anticoagulation as she has been deemed high bleed risk per the  patient and her cardiology notes from Dr. Khadra Flood.   5.  I discussed with the patient further attempts at titrating her blood  pressure medications; she said this has been unsuccessful in the past.   She is intolerant to many medications and she states many other  medications were just simply ineffective. She understands the risk of  her persistent hypertension. At this time, she states that she will  just follow up with her primary cardiologist and primary care physician  for further care in regards to this.         Bailee Navarro MD    D: 04/01/2019 9:56:02       T: 04/01/2019 13:14:00     /[email or web address]  Job#: [id number]     Doc#: [id number]    CC:

## 2019-04-01 NOTE — H&P
Inpatient Family Medicine Service   History & Physical        PCP: Mylene Jorge MD    Date of Admission: 4/1/2019    Date of Service: Pt seen/examined on 4/1/2019 and admitted to Inpatient with expected LOS greater than two midnights due to medical therapy. Chief Complaint:  Chest pain    History Of Present Illness:    Laurence Huertas is a 66 y.o. female who presented to [facility] with chest pain. Pt reports that she has been having chest pain intermittently since Thursday but that it worsened yesterday. Has a history of atrial fibrillation which has been well controlled for several years. Followed by Dr Natasha Dwyer in cardiology, most recently in December. Pain is localized to substernal and left chest regions with radiation up to her left neck. Feels like heaviness. Yesterday the chest discomfort worsened and patient was having some trouble with deep inspiration with some lightheadedness. While in the ED, EKG showed normal sinus rhythm with LVH and repolarization abnormality. Patient was not in atrial fibrillation. Troponin levels have been negative. Received nitroglycerin while in ED. Today, patient doing well. Denies any current chest but reports that she had a short episode of chest discomfort about 15 minutes prior to being evaluated. She report typically \"feeling yucky\" since being on chemo many years ago. Feels tired due to not sleeping last night. Reports that her BP typically runs in 220s.      Past Medical History:          Diagnosis Date    Abnormal LFT's     Arthritis     Atrial fibrillation ([address]     Follicular lymphoma (HCC)     Dr Phil Jacobson GERD (gastroesophageal reflux disease)     Glaucoma     Gout 11/2/2010    Hypertension     Liver disease     steatohepatitis    Migraine     Non-ulcer dyspepsia     egd neg 11/2011    Peripheral vascular disease ([address]     Thyroid disease        Past Surgical History:          Procedure Laterality Date    APPENDECTOMY      CATARACT REMOVAL      2007    CHOLECYSTECTOMY      2005    COLONOSCOPY  2/27/2015    polyp    ENDOSCOPY, COLON, DIAGNOSTIC  11/14/2011    bx stomach polyp    HYSTERECTOMY      LYMPH NODE BIOPSY      THYROIDECTOMY      76    TONSILLECTOMY      UPPER GASTROINTESTINAL ENDOSCOPY      UPPER GASTROINTESTINAL ENDOSCOPY  2/27/2015    gastric polyp       Medications Prior to Admission:      Prior to Admission medications    Medication Sig Start Date End Date Taking? Authorizing Provider   mexiletine (MEXITIL) 150 MG capsule Take 150 mg by mouth 3 times daily Pt unaware of correct dosage    Historical Provider, MD   ondansetron (ZOFRAN) 4 MG tablet Take 1 tablet by mouth every 8 hours as needed for Nausea or Vomiting 12/7/16   Olga Garcia MD   amitriptyline (ELAVIL) 10 MG tablet Take 10 mg by mouth nightly    Historical Provider, MD   minoxidil (LONITEN) 2.5 MG tablet Take 2.5 mg by mouth 2 times daily. Historical Provider, MD   meclizine (ANTIVERT) 25 MG tablet Take 25 mg by mouth 3 times daily as needed. Historical Provider, MD   minoxidil (LONITEN) 2.5 MG tablet Take 1 tablet by mouth 2 times daily for 90 days. 4/21/13 7/8/17  Jennifer Charles MD   metoprolol (TOPROL-XL) 50 MG XL tablet Take 25 mg by mouth daily. Historical Provider, MD   olmesartan (BENICAR) 40 MG tablet Take 40 mg by mouth daily. Historical Provider, MD   flecainide (TAMBOCOR) 50 MG tablet Take 50 mg by mouth 2 times daily. Historical Provider, MD   omeprazole (PRILOSEC) 20 MG capsule Take 40 mg by mouth daily. Historical Provider, MD   timolol (TIMOPTIC-XR) 0.5 % ophthalmic gel-forming Place 1 drop into both eyes 2 times daily. Historical Provider, MD   Not taking Mexitil    Allergies:  Latex; Ferric carboxymaltose; Aldactone [spironolactone]; Baclofen; Cardizem [diltiazem hcl]; Dilaudid [hydromorphone hcl]; Droperidol; Guanfacine hcl; Hydrochlorothiazide; Ibuprofen; Lasix [furosemide];  Lorazepam; Nexium [esomeprazole magnesium trihydrate]; Nortriptyline; Other; Phenergan [promethazine hcl]; Phenergan [promethazine hcl]; Phenothiazines; Pilocarpine; Prochlorperazine edisylate; Reglan [metoclopramide hcl]; Rofecoxib; Triavil [perphenazine-amitriptyline]; Ultracet [tramadol-acetaminophen]; Valium; Vioxx; Zofran odt [ondansetron]; Amlodipine; Amoxicillin; Augmentin [amoxicillin-pot clavulanate]; Avelox [moxifloxacin]; Bactrim [sulfamethoxazole-trimethoprim]; Biaxin [clarithromycin]; Cephalexin; Clonidine derivatives; Codeine; Coreg [carvedilol]; Demerol; Hydralazine; Levaquin [levofloxacin in d5w]; Maxalt [rizatriptan benzoate]; Morphine and related; Scopolamine; and Zonisamide    Social History:      The patient currently lives with  in house    TOBACCO:   reports that she has never smoked. She has never used smokeless tobacco.  ETOH:   reports that she does not drink alcohol. Denies any illicit drug use. Family History:          Problem Relation Age of Onset    Heart Disease Mother     Cancer Sister     Cancer Brother        REVIEW OF SYSTEMS:   Pertinent positives as noted in the HPI. All other systems reviewed and negative. PHYSICAL EXAM PERFORMED:    BP (!) 214/71   Pulse 57   Temp 97.7 °F (36.5 °C) (Oral)   Resp 16   Ht 5' (1.524 m)   Wt 115 lb 1.6 oz (52.2 kg)   SpO2 98%   BMI 22.48 kg/m²     General appearance:  No apparent distress, appears stated age and cooperative. HEENT:  Normal cephalic, atraumatic without obvious deformity. Pupils equal, round, and reactive to light. Extra ocular muscles intact. Conjunctivae/corneas clear. Neck: Supple, with full range of motion. No jugular venous distention. Trachea midline. Respiratory:  Normal respiratory effort. Clear to auscultation, bilaterally without Rales/Wheezes/Rhonchi. Cardiovascular:  Regular rate and rhythm with normal S1/S2 without murmurs, rubs or gallops.   Abdomen: Soft, non-tender, non-distended with normal bowel sounds. Musculoskeletal:  No clubbing, cyanosis or edema bilaterally. Full range of motion without deformity. Skin: Skin color, texture, turgor normal.  No rashes or lesions. Neurologic:  Neurovascularly intact without any focal sensory/motor deficits. Cranial nerves: II-XII intact, grossly non-focal.  Psychiatric:  Alert and oriented, thought content appropriate, normal insight  Capillary Refill: Brisk,< 3 seconds   Peripheral Pulses: +2 palpable, equal bilaterally       Labs:     Recent Labs     04/01/19  0158   WBC 4.2   HGB 13.2   HCT 38.5   *     Recent Labs     04/01/19  0158      K 3.3*      CO2 25   BUN 12   CREATININE 0.8   CALCIUM 8.3     Recent Labs     04/01/19 0158   AST 19   ALT 9*   BILITOT 0.5   ALKPHOS 109     No results for input(s): INR in the last 72 hours. Recent Labs     04/01/19  0158 04/01/19  0532   TROPONINI <0.01 <0.01       Urinalysis:      Lab Results   Component Value Date    NITRU Negative 12/07/2016    WBCUA 0-2 12/07/2016    BACTERIA 3+ 12/05/2016    RBCUA 0-2 12/07/2016    BLOODU TRACE 12/07/2016    SPECGRAV 1.025 12/07/2016    GLUCOSEU Negative 12/07/2016    GLUCOSEU NEGATIVE 02/03/2012       Radiology:     XR CHEST PORTABLE   Final Result   No acute cardiopulmonary disease or significant interval change from prior   study 05/20/2017               EKG:  I have reviewed the EKG with the following interpretation: normal sinus rhythm with LVH and repolarization abnormality. ASSESSMENT:    Active Hospital Problems    Diagnosis Date Noted    Chest pain [R07.9] 04/01/2019       Travon Mandujano is a 66 y.o. Hospital Day #1 admitted for chest pain.       PLAN:    -- Chest pain   - Currently stable   - Continue continuous telemetry   - EKG negative for acute MI, troponins negative x 2    - Cardiology following    - Plan on nuclear stress test tomorrow and Echo    - NPO after midnight    - Increased Flecainide to 75mg BID     -- Atrial fibrillation   - Cardiology following   - A fib not seen on initial EKG   - Continue flecainide   - Pt reports that she is not taking mexiletine and is unsure of when it was even prescribed. Appears that this medication may have been started in 2017. Will hold medication at this time. -- Hypertension    - Pt reports that she typically runs systolic 123O    - Continue home Losartan 100mg daily and Metoprolol XL 25mg daily    - Continue Minoxidil 2.5mg BID    DVT Prophylaxis: SCDs ordered. Pt declined Lovenox due to history of gastric ulcers    Diet: DIET CARDIAC; No Caffeine    Code Status: Full Code    PT/OT Eval Status: N/A    Dispo - 1-2 days. This patient was seen and evaluated with resident team. Will further discuss with attending, Dr. Delynn Castleman. Schuyler Perez D.O.    [facility] [id number]. Service  PGY-1

## 2019-04-01 NOTE — ED PROVIDER NOTES
on file   Occupational History    Not on file   Social Needs    Financial resource strain: Not on file    Food insecurity:     Worry: Not on file     Inability: Not on file    Transportation needs:     Medical: Not on file     Non-medical: Not on file   Tobacco Use    Smoking status: Never Smoker    Smokeless tobacco: Never Used   Substance and Sexual Activity    Alcohol use: No    Drug use: No    Sexual activity: Yes   Lifestyle    Physical activity:     Days per week: Not on file     Minutes per session: Not on file    Stress: Not on file   Relationships    Social connections:     Talks on phone: Not on file     Gets together: Not on file     Attends Synagogue service: Not on file     Active member of club or organization: Not on file     Attends meetings of clubs or organizations: Not on file     Relationship status: Not on file    Intimate partner violence:     Fear of current or ex partner: Not on file     Emotionally abused: Not on file     Physically abused: Not on file     Forced sexual activity: Not on file   Other Topics Concern    Not on file   Social History Narrative    Not on file     Current Facility-Administered Medications   Medication Dose Route Frequency Provider Last Rate Last Dose    nitroglycerin (NITRO-BID) 2 % ointment 1 inch  1 inch Topical Once Mariam Dickey MD         Current Outpatient Medications   Medication Sig Dispense Refill    mexiletine (MEXITIL) 150 MG capsule Take 150 mg by mouth 3 times daily Pt unaware of correct dosage      ondansetron (ZOFRAN) 4 MG tablet Take 1 tablet by mouth every 8 hours as needed for Nausea or Vomiting 20 tablet 0    amitriptyline (ELAVIL) 10 MG tablet Take 10 mg by mouth nightly      minoxidil (LONITEN) 2.5 MG tablet Take 2.5 mg by mouth 2 times daily.  meclizine (ANTIVERT) 25 MG tablet Take 25 mg by mouth 3 times daily as needed.  minoxidil (LONITEN) 2.5 MG tablet Take 1 tablet by mouth 2 times daily for 90 days. 60 tablet 2    metoprolol (TOPROL-XL) 50 MG XL tablet Take 25 mg by mouth daily.  olmesartan (BENICAR) 40 MG tablet Take 40 mg by mouth daily.  flecainide (TAMBOCOR) 50 MG tablet Take 50 mg by mouth 2 times daily.  omeprazole (PRILOSEC) 20 MG capsule Take 40 mg by mouth daily.  timolol (TIMOPTIC-XR) 0.5 % ophthalmic gel-forming Place 1 drop into both eyes 2 times daily.          Allergies   Allergen Reactions    Latex Hives    Ferric Carboxymaltose Anaphylaxis    Aldactone [Spironolactone]     Baclofen Other (See Comments)     Dizziness, hallucinations    Cardizem [Diltiazem Hcl]      Increases liver enzymes    Dilaudid [Hydromorphone Hcl] Other (See Comments)     Vomitting, and dizziness    Droperidol     Guanfacine Hcl Itching     Night terrors,insomnia,extreme dizziness    Hydrochlorothiazide      Causes gout    Ibuprofen Hives    Lasix [Furosemide]     Lorazepam     Nexium [Esomeprazole Magnesium Trihydrate] Other (See Comments)     Oral sores    Nortriptyline Other (See Comments)     Severe night terrors    Other      Flu shot    Phenergan [Promethazine Hcl]     Phenergan [Promethazine Hcl]      Tardive dyskinesia    Phenothiazines      Causes parkinson sx    Pilocarpine     Prochlorperazine Edisylate     Reglan [Metoclopramide Hcl]     Rofecoxib     Triavil [Perphenazine-Amitriptyline]      Parkinson sxs    Ultracet [Tramadol-Acetaminophen] Other (See Comments)     dizziness    Valium     Vioxx Other (See Comments)     Facial and lip swelling    Zofran Odt [Ondansetron]      Zofran PO is ok    Amlodipine Itching, Nausea And Vomiting and Swelling    Amoxicillin Rash    Augmentin [Amoxicillin-Pot Clavulanate] Itching and Rash    Avelox [Moxifloxacin] Rash    Bactrim [Sulfamethoxazole-Trimethoprim] Nausea And Vomiting     And thrush/yeast unfection    Biaxin [Clarithromycin] Itching and Rash    Cephalexin Rash    Clonidine Derivatives Anxiety    Codeine Nausea And Vomiting and Rash    Coreg [Carvedilol] Rash    Demerol Nausea And Vomiting    Hydralazine Itching, Swelling and Rash    Levaquin [Levofloxacin In D5w] Nausea And Vomiting    Maxalt [Rizatriptan Benzoate] Palpitations    Morphine And Related Rash    Scopolamine Nausea And Vomiting     severe    Zonisamide Anxiety     Nursing Notes Reviewed    Physical Exam:  ED Triage Vitals   Enc Vitals Group      BP       Pulse       Resp       Temp       Temp src       SpO2       Weight       Height       Head Circumference       Peak Flow       Pain Score       Pain Loc       Pain Edu? Excl. in [address]? GENERAL APPEARANCE: A well-developed well-nourished pleasant anxious elderly female in moderate distress  HEAD: Normocephalic, atraumatic  EYES: Sclera anicteric.no conjunctival injection,   ENT: Mucous membranes moist, no nasal discharge, pharynx clear, no stridor,   NECK: Supple, no meningismus, no JVD  HEART: RRR without rubs murmurs or gallops, pulses are 2+ and equal carotid radial femoral and dorsalis pedis  LUNGS:  Clear good air movement no wheezing no retraction or accessory muscle use,  ABDOMEN: Soft, non-tender to palpation, no guarding or rebound. , no mass or distention and no hepatosplenomegaly. EXTREMITIES: No acute deformities, no peripheral edema  SKIN: Warm and dry.  Normal color, no rash,  capillary refill less than 2 seconds  MENTAL STATUS: Alert, oriented, interactive,   NEUROLOGICAL:  No facial drooping. moves all 4 extremities, sensation intact, no focal findings     Nursing note and vital signs reviewed     I have reviewed and interpreted all of the currently available lab results from this visit (if applicable):  Results for orders placed or performed during the hospital encounter of 04/01/19   CBC Auto Differential   Result Value Ref Range    WBC 4.2 4.0 - 11.0 K/uL    RBC 4.07 4.00 - 5.20 M/uL    Hemoglobin 13.2 12.0 - 16.0 g/dL    Hematocrit 38.5 36.0 - 48.0 %    MCV 94.4 fibrillation but has not had known coronary artery disease in the past.  Her labs were not diagnostically abnormal her EKG did not show acute changes and she was not in atrial fibrillation. She has chronic elevated blood pressure on multiple medications. I will her admitted for further evaluation and to rule out ACS. Final Impression:  1. Chest pain, unspecified type        Critical Care:     Patient had a critical-care time of 44 minutes including time to bedside time evaluating lab studies and in discussion with consultants. This does not include any billable procedures. Concern for this patient his ACS treatment was nitroglycerin and admission  Disposition referral (if applicable):  No follow-up provider specified. Disposition medications (if applicable):  New Prescriptions    No medications on file       Comment: Please note this report has been produced using speech recognition software and may contain errors related to that system including errors in grammar, punctuation, and spelling, as well as words and phrases that may be inappropriate. If there are any questions or concerns please feel free to contact the dictating provider for clarification.       (Please note that portions of this note may have been completed with a voice recognition program. Efforts were made to edit the dictations but occasionally words are mis-transcribed.)    MD James Alanis MD  04/01/19 8046       James Torres MD  04/01/19 5866

## 2019-04-02 ENCOUNTER — APPOINTMENT (OUTPATIENT)
Dept: NUCLEAR MEDICINE | Age: 74
DRG: 305 | End: 2019-04-02
Payer: MEDICARE

## 2019-04-02 LAB
A/G RATIO: 1.5 (ref 1.1–2.2)
ALBUMIN SERPL-MCNC: 3.8 G/DL (ref 3.4–5)
ALP BLD-CCNC: 109 U/L (ref 40–129)
ALT SERPL-CCNC: 8 U/L (ref 10–40)
ANION GAP SERPL CALCULATED.3IONS-SCNC: 11 MMOL/L (ref 3–16)
AST SERPL-CCNC: 18 U/L (ref 15–37)
BILIRUB SERPL-MCNC: 0.8 MG/DL (ref 0–1)
BUN BLDV-MCNC: 13 MG/DL (ref 7–20)
CALCIUM SERPL-MCNC: 8.7 MG/DL (ref 8.3–10.6)
CHLORIDE BLD-SCNC: 103 MMOL/L (ref 99–110)
CO2: 29 MMOL/L (ref 21–32)
CREAT SERPL-MCNC: 1 MG/DL (ref 0.6–1.2)
EKG ATRIAL RATE: 59 BPM
EKG DIAGNOSIS: NORMAL
EKG P AXIS: -20 DEGREES
EKG P-R INTERVAL: 212 MS
EKG Q-T INTERVAL: 518 MS
EKG QRS DURATION: 90 MS
EKG QTC CALCULATION (BAZETT): 512 MS
EKG R AXIS: 50 DEGREES
EKG T AXIS: -26 DEGREES
EKG VENTRICULAR RATE: 59 BPM
GFR AFRICAN AMERICAN: >60
GFR NON-AFRICAN AMERICAN: 54
GLOBULIN: 2.5 G/DL
GLUCOSE BLD-MCNC: 115 MG/DL (ref 70–99)
HCT VFR BLD CALC: 38.3 % (ref 36–48)
HEMOGLOBIN: 13.2 G/DL (ref 12–16)
LV EF: 60 %
LVEF MODALITY: NORMAL
MAGNESIUM: 1.9 MG/DL (ref 1.8–2.4)
MCH RBC QN AUTO: 32.3 PG (ref 26–34)
MCHC RBC AUTO-ENTMCNC: 34.4 G/DL (ref 31–36)
MCV RBC AUTO: 93.7 FL (ref 80–100)
PDW BLD-RTO: 13.8 % (ref 12.4–15.4)
PLATELET # BLD: 123 K/UL (ref 135–450)
PMV BLD AUTO: 9.5 FL (ref 5–10.5)
POTASSIUM REFLEX MAGNESIUM: 3.4 MMOL/L (ref 3.5–5.1)
RBC # BLD: 4.08 M/UL (ref 4–5.2)
SODIUM BLD-SCNC: 143 MMOL/L (ref 136–145)
TOTAL PROTEIN: 6.3 G/DL (ref 6.4–8.2)
WBC # BLD: 4.2 K/UL (ref 4–11)

## 2019-04-02 PROCEDURE — A9502 TC99M TETROFOSMIN: HCPCS | Performed by: INTERNAL MEDICINE

## 2019-04-02 PROCEDURE — 2580000003 HC RX 258: Performed by: FAMILY MEDICINE

## 2019-04-02 PROCEDURE — 93017 CV STRESS TEST TRACING ONLY: CPT

## 2019-04-02 PROCEDURE — 6370000000 HC RX 637 (ALT 250 FOR IP): Performed by: FAMILY MEDICINE

## 2019-04-02 PROCEDURE — 83735 ASSAY OF MAGNESIUM: CPT

## 2019-04-02 PROCEDURE — 1200000000 HC SEMI PRIVATE

## 2019-04-02 PROCEDURE — 6370000000 HC RX 637 (ALT 250 FOR IP): Performed by: INTERNAL MEDICINE

## 2019-04-02 PROCEDURE — 2500000003 HC RX 250 WO HCPCS: Performed by: STUDENT IN AN ORGANIZED HEALTH CARE EDUCATION/TRAINING PROGRAM

## 2019-04-02 PROCEDURE — 3430000000 HC RX DIAGNOSTIC RADIOPHARMACEUTICAL: Performed by: INTERNAL MEDICINE

## 2019-04-02 PROCEDURE — 85027 COMPLETE CBC AUTOMATED: CPT

## 2019-04-02 PROCEDURE — 6360000002 HC RX W HCPCS: Performed by: INTERNAL MEDICINE

## 2019-04-02 PROCEDURE — 2700000000 HC OXYGEN THERAPY PER DAY

## 2019-04-02 PROCEDURE — 6360000002 HC RX W HCPCS: Performed by: STUDENT IN AN ORGANIZED HEALTH CARE EDUCATION/TRAINING PROGRAM

## 2019-04-02 PROCEDURE — 94761 N-INVAS EAR/PLS OXIMETRY MLT: CPT

## 2019-04-02 PROCEDURE — 2580000003 HC RX 258: Performed by: STUDENT IN AN ORGANIZED HEALTH CARE EDUCATION/TRAINING PROGRAM

## 2019-04-02 PROCEDURE — 93976 VASCULAR STUDY: CPT

## 2019-04-02 PROCEDURE — 78452 HT MUSCLE IMAGE SPECT MULT: CPT

## 2019-04-02 PROCEDURE — 80053 COMPREHEN METABOLIC PANEL: CPT

## 2019-04-02 PROCEDURE — 99233 SBSQ HOSP IP/OBS HIGH 50: CPT | Performed by: NURSE PRACTITIONER

## 2019-04-02 RX ORDER — ONDANSETRON 2 MG/ML
4 INJECTION INTRAMUSCULAR; INTRAVENOUS ONCE
Status: DISCONTINUED | OUTPATIENT
Start: 2019-04-02 | End: 2019-04-02

## 2019-04-02 RX ORDER — 0.9 % SODIUM CHLORIDE 0.9 %
500 INTRAVENOUS SOLUTION INTRAVENOUS ONCE
Status: COMPLETED | OUTPATIENT
Start: 2019-04-02 | End: 2019-04-02

## 2019-04-02 RX ORDER — POTASSIUM CHLORIDE 29.8 MG/ML
20 INJECTION INTRAVENOUS
Status: COMPLETED | OUTPATIENT
Start: 2019-04-02 | End: 2019-04-02

## 2019-04-02 RX ORDER — SODIUM CHLORIDE 9 MG/ML
INJECTION, SOLUTION INTRAVENOUS
Status: DISPENSED
Start: 2019-04-02 | End: 2019-04-03

## 2019-04-02 RX ORDER — AMINOPHYLLINE DIHYDRATE 25 MG/ML
50 INJECTION, SOLUTION INTRAVENOUS ONCE
Status: COMPLETED | OUTPATIENT
Start: 2019-04-02 | End: 2019-04-02

## 2019-04-02 RX ORDER — ONDANSETRON 2 MG/ML
4 INJECTION INTRAMUSCULAR; INTRAVENOUS EVERY 8 HOURS PRN
Status: DISCONTINUED | OUTPATIENT
Start: 2019-04-02 | End: 2019-04-03 | Stop reason: HOSPADM

## 2019-04-02 RX ADMIN — FLECAINIDE ACETATE 75 MG: 50 TABLET ORAL at 10:53

## 2019-04-02 RX ADMIN — LABETALOL HYDROCHLORIDE 10 MG: 5 INJECTION INTRAVENOUS at 12:55

## 2019-04-02 RX ADMIN — SODIUM CHLORIDE 500 ML: 9 INJECTION, SOLUTION INTRAVENOUS at 13:20

## 2019-04-02 RX ADMIN — MEXILETINE HYDROCHLORIDE 150 MG: 150 CAPSULE ORAL at 10:53

## 2019-04-02 RX ADMIN — LOSARTAN POTASSIUM 100 MG: 25 TABLET, FILM COATED ORAL at 10:43

## 2019-04-02 RX ADMIN — PANTOPRAZOLE SODIUM 40 MG: 40 TABLET, DELAYED RELEASE ORAL at 07:06

## 2019-04-02 RX ADMIN — ACETAMINOPHEN 650 MG: 325 TABLET ORAL at 10:44

## 2019-04-02 RX ADMIN — POTASSIUM CHLORIDE 20 MEQ: 29.8 INJECTION, SOLUTION INTRAVENOUS at 15:49

## 2019-04-02 RX ADMIN — METOPROLOL SUCCINATE 25 MG: 25 TABLET, EXTENDED RELEASE ORAL at 10:43

## 2019-04-02 RX ADMIN — TIMOLOL MALEATE 1 DROP: 5 SOLUTION OPHTHALMIC at 10:52

## 2019-04-02 RX ADMIN — TIMOLOL MALEATE 1 DROP: 5 SOLUTION OPHTHALMIC at 22:03

## 2019-04-02 RX ADMIN — ASPIRIN 81 MG 81 MG: 81 TABLET ORAL at 10:43

## 2019-04-02 RX ADMIN — SODIUM CHLORIDE, PRESERVATIVE FREE 10 ML: 5 INJECTION INTRAVENOUS at 22:04

## 2019-04-02 RX ADMIN — SODIUM CHLORIDE, PRESERVATIVE FREE 10 ML: 5 INJECTION INTRAVENOUS at 10:44

## 2019-04-02 RX ADMIN — ONDANSETRON HYDROCHLORIDE 4 MG: 8 TABLET, FILM COATED ORAL at 12:23

## 2019-04-02 RX ADMIN — AMITRIPTYLINE HYDROCHLORIDE 10 MG: 10 TABLET, FILM COATED ORAL at 22:04

## 2019-04-02 RX ADMIN — MINOXIDIL 2.5 MG: 2.5 TABLET ORAL at 10:54

## 2019-04-02 RX ADMIN — MINOXIDIL 2.5 MG: 2.5 TABLET ORAL at 22:03

## 2019-04-02 RX ADMIN — TETROFOSMIN 11 MILLICURIE: 0.23 INJECTION, POWDER, LYOPHILIZED, FOR SOLUTION INTRAVENOUS at 07:59

## 2019-04-02 RX ADMIN — AMINOPHYLLINE 50 MG: 25 INJECTION, SOLUTION INTRAVENOUS at 09:05

## 2019-04-02 RX ADMIN — REGADENOSON 0.4 MG: 0.08 INJECTION, SOLUTION INTRAVENOUS at 09:02

## 2019-04-02 RX ADMIN — POTASSIUM CHLORIDE 20 MEQ: 29.8 INJECTION, SOLUTION INTRAVENOUS at 13:43

## 2019-04-02 RX ADMIN — TETROFOSMIN 30.3 MILLICURIE: 0.23 INJECTION, POWDER, LYOPHILIZED, FOR SOLUTION INTRAVENOUS at 09:03

## 2019-04-02 ASSESSMENT — PAIN SCALES - GENERAL: PAINLEVEL_OUTOF10: 5

## 2019-04-02 NOTE — PROGRESS NOTES
[address]   Daily Progress Note    Admit Date:  4/1/2019  HPI:    Chief Complaint   Patient presents with    Chest Pain    Ms. Adilson Cameron was admitted on 4/1/2019 with complaints of chest pain with exertion and rest. She described as a pressure sensation in the center of her chest, radiated up to her left shoulder, arm, and neck. This pain lasted approximately one hour. 4/2/2019- Blood pressure went up to 214/99, patient received one time dose of labetalol which dropped b/p to 88/55 and heart rate 39. She did require oxygen at that time and a bolus of 500 ml was given. Subjective:  Ms. Adilson Cameron resting in bed NAD,with  at bedside. No recurrent chest pain or palpitations during hospitalization. She stated that after the dose of labetalol was given, her arms and legs feel funny with jerking movements at times. She stated that she tried every blood pressure medicine and had side effects that she couldn't tolerate. Most of them made her hallucinate. She is not willing to try anything that she wasn't taking prior to her chemo in 2011. Objective:   BP (!) 156/77   Pulse 58   Temp 97.3 °F (36.3 °C) (Oral)   Resp 18   Ht 5' (1.524 m)   Wt 112 lb 3.4 oz (50.9 kg)   SpO2 99%   BMI 21.92 kg/m²       Intake/Output Summary (Last 24 hours) at 4/2/2019 1328  Last data filed at 4/2/2019 1156  Gross per 24 hour   Intake 351 ml   Output 0 ml   Net 351 ml     Wt Readings from Last 3 Encounters:   04/02/19 112 lb 3.4 oz (50.9 kg)   12/17/18 114 lb (51.7 kg)   03/27/18 113 lb (51.3 kg)         ASSESSMENT:   1. Chest Pain- typical and atypical features of angina. Stress test results showed low risk (abnormal) study. 2. HTN- poorly controlled- which is baseline per patient - is refusing to start any new b/p meds that she wasn't taking prior to starting chemo. 3. No anticoagulation per Dr. Jefe Faustin due to high bleeding risk. 4. Hypokalemia- 3.4- IV potassium ordered per IM   5.  PAF- no afib per tele-increased symptoms recently agreed with increasing flecainide To 75 mg BID and discontinuing the mexitil. PLAN:  1. Continue medical management for hypertension as ordered  2. VL Renal Duplex ultrasound ordered  3. Recheck BMP in am  4. Hold flecainide at this time due bradycardia after receiving mexitil - will re-evaluate in the am  5. If blood pressure continues to be high tomorrow, if patient agreeable consider a gentle increase in minoxidil        Irene Baker, APRN - CNP, 4/2/2019, 1:28 PM  [address]   [phone]       Telemetry: SB 30-50  NYHA: III    Physical Exam:  General:  Awake, alert, NAD  Skin:  Warm and dry  Neck:  JVP 8  Chest:  Clear to auscultation   Cardiovascular:  Regular rhythm, Bradycardia, normal S1S2, no m/g/r  Abdomen:  Soft, nontender, +bowel sounds  Extremities:  No edema  Neurological- Alert and oriented x3         Medications:    potassium chloride  20 mEq Intravenous Q1H    sodium chloride  500 mL Intravenous Once    amitriptyline  10 mg Oral Nightly    metoprolol succinate  25 mg Oral Daily    minoxidil  2.5 mg Oral BID    losartan  100 mg Oral Daily    pantoprazole  40 mg Oral QAM AC    timolol  1 drop Both Eyes BID    sodium chloride flush  10 mL Intravenous 2 times per day    aspirin  81 mg Oral Daily    enoxaparin  40 mg Subcutaneous Daily    flecainide  75 mg Oral BID    potassium chloride  40 mEq Oral Once      sodium chloride         Lab Data: Lab results independently reviewed by myself 4/2/19   CBC:   Recent Labs     04/01/19 0158 04/02/19  0700   WBC 4.2 4.2   HGB 13.2 13.2   * 123*     BMP:    Recent Labs     04/01/19 0158 04/02/19  0700    143   K 3.3* 3.4*   CO2 25 29   BUN 12 13   CREATININE 0.8 1.0     INR:  No results for input(s): INR in the last 72 hours.   BNP:    Recent Labs     04/01/19 0158   PROBNP 1,371*     Cardiac Enzymes:   Recent Labs     04/01/19 0158 04/01/19  0532   TROPONINI <0.01 <0.01     Lipids:   Lab Results   Component Value Date    TRIG 110 07/21/2011    TRIG 114 07/09/2010    HDL 51 07/21/2011    HDL 48 07/09/2010    LDLCALC 107 07/21/2011    LDLCALC 126 07/09/2010       Cardiac Imaging:   NM Stress Test- 4/2/2019:  Summary  Abnormal EKG response. Normal LV function. There is normal isotope uptake at stress and rest.  There is no evidence of  myocardial ischemia or scar. Overall findings represent a low risk (abnormal) study. Echo- 4/1/2019:   Summary   Normal LV systolic function with ejection fraction of 55-60%. No regional wall motion abnormalites are seen. Mild concentric left ventricular hypertrophy. Evidence for type II diastolic dysfunction with elevated filling pressure. Mild mitral, aortic, and tricuspid regurgitation. Systolic pulmonic artery pressure (SPAP) is estimated at 41mmHg consistent   with mild pulmonary hypertension (RAP of 3mmHg). Echo- 11/14/2014  Summary:  Overall left ventricular ejection fraction is estimated to be 60-65%. The left ventricular wall motion is normal.  Mild aortic regurgitation. Right ventricular systolic pressure is mildly elevated at 35-45 mmHg. Mild tricuspid regurgitation is present. Mild pulmonic valvular regurgitation. There is moderate mitral regurgitation. Catheter/Port noted in right atrium ?   Left atrial size is at the upper limits of normal.        Chest X-Ray- 4/1/2019:  Impression:        No acute cardiopulmonary disease or significant interval change from prior  study 05/20/2017

## 2019-04-02 NOTE — PROGRESS NOTES
VSS - afebrile. Pt is alert and oriented x 4 with history of falls. Assessment completed as charted. Bed is in lowest position with 2/4 bed rails raised, bed alarm turned, wheels locked and call light within reach - patient wearing non-skid socks and verbalizes understanding to call out for assistance. No further requests at this time. Will continue to monitor.      Vitals:    04/01/19 1921   BP: (!) 165/88   Pulse: 64   Resp: 18   Temp: 98 °F (36.7 °C)   SpO2: 96%

## 2019-04-02 NOTE — PROGRESS NOTES
Inpatient Family Medicine   Progress Note      PCP: Marcella Ozuna MD    Date of Admission: 4/1/2019    Chief Complaint: Chest pain    Hospital Course: In ED, EKG showed normal sinus rhythm with LVH and repolarization abnormality. Not in atrial fibrillation. Troponin levels negative. Echocardiogram with normal EF 55-60%, mild LVH, and mild mitral/aortic/tricuspid regurgitation. Seen by cardiology who plan on stress test today. Subjective: Pt resting comfortably in bed. Just returned from stress test. Denies any CP/SOB, nausea, vomiting, abdominal pain, diarrhea, or constipation. She declined PO potassium yesterday due to having issues with nausea and upset stomach when taking PO in the past.     Medications:  Reviewed    Infusion Medications   Scheduled Medications    amitriptyline  10 mg Oral Nightly    metoprolol succinate  25 mg Oral Daily    mexiletine  150 mg Oral TID    minoxidil  2.5 mg Oral BID    losartan  100 mg Oral Daily    pantoprazole  40 mg Oral QAM AC    timolol  1 drop Both Eyes BID    sodium chloride flush  10 mL Intravenous 2 times per day    aspirin  81 mg Oral Daily    enoxaparin  40 mg Subcutaneous Daily    flecainide  75 mg Oral BID    potassium chloride  40 mEq Oral Once     PRN Meds: meclizine, ondansetron, sodium chloride flush, magnesium hydroxide, acetaminophen, nitroGLYCERIN, regadenoson      Intake/Output Summary (Last 24 hours) at 4/2/2019 0753  Last data filed at 4/1/2019 2156  Gross per 24 hour   Intake 588 ml   Output 0 ml   Net 588 ml       Physical Exam Performed:    BP (!) 149/74   Pulse 52   Temp 97.7 °F (36.5 °C) (Oral)   Resp 16   Ht 5' (1.524 m)   Wt 112 lb 3.4 oz (50.9 kg)   SpO2 97%   BMI 21.92 kg/m²     General appearance: No apparent distress, appears stated age and cooperative. HEENT: Pupils equal, round, and reactive to light. Conjunctivae/corneas clear. Neck: Supple, with full range of motion. No jugular venous distention.  Trachea pressure (SPAP) is estimated at 41mmHg consistent with mild pulmonary hypertension (RAP of 3mmHg). Stress Test (4/2/19)  Summary    Abnormal EKG response.    Normal LV function.    There is normal isotope uptake at stress and rest. There is no evidence of    myocardial ischemia or scar.    Overall findings represent a low risk (abnormal) study. Assessment/Plan:    Active Hospital Problems    Diagnosis Date Noted    Chest pain [R07.9] 04/01/2019    Hypokalemia [E87.6] 02/05/2012    PAF (paroxysmal atrial fibrillation) ([facility] [address] [I48.0] 09/06/2011     Mina Rendon is a 66 y.o. Hospital Day #2 admitted for chest pain.     -- Chest pain              - Currently stable              - Continue continuous telemetry              - EKG negative for acute MI, troponins negative x 2   - Echo with normal EF and mild regurgitation as noted above               - Cardiology following                          - S/p nuclear stress test today with results as noted above. Will await recommendations from cardiology. - Increased Flecainide to 75mg BID      -- Atrial fibrillation              - Cardiology following              - A fib not seen on initial EKG              - Continue flecainide         -- Hypertension    - BP improved overnight with systolic in 431-384M              - Pt reports that she typically runs systolic 663N               - Continue home Losartan 100mg daily and Metoprolol XL 25mg daily               - Continue Minoxidil 2.5mg BID    -- Hypokalemia   - K 3.3 on 4/1. Repeat CMP this AM   - Pt declined PO KCl yesterday due to issues with GI issues in past. She is amenable to receiving IV. Will give 40mEq of KCl IV    DVT Prophylaxis: SCDs ordered. Pt declined Lovenox due to history of gastric ulcers  Diet: Diet NPO, After Midnight  Code Status: Full Code  PT/OT Eval Status: N/A    Dispo: 1-2 days.   This patient was seen and evaluated with resident team. Will further discuss with attending, Dr. Jason Viera D.O.    [facility] [address]. Service  PGY-1

## 2019-04-02 NOTE — PROGRESS NOTES
A Lexiscan stress test was completed on this patient as ordered. The patient tolerated the procedure well. Awaiting stress imaging at this time.

## 2019-04-03 VITALS
WEIGHT: 112.7 LBS | BODY MASS INDEX: 22.13 KG/M2 | RESPIRATION RATE: 19 BRPM | OXYGEN SATURATION: 95 % | HEIGHT: 60 IN | HEART RATE: 66 BPM | SYSTOLIC BLOOD PRESSURE: 164 MMHG | TEMPERATURE: 97.9 F | DIASTOLIC BLOOD PRESSURE: 87 MMHG

## 2019-04-03 LAB
ANION GAP SERPL CALCULATED.3IONS-SCNC: 10 MMOL/L (ref 3–16)
BUN BLDV-MCNC: 12 MG/DL (ref 7–20)
CALCIUM SERPL-MCNC: 8.5 MG/DL (ref 8.3–10.6)
CHLORIDE BLD-SCNC: 104 MMOL/L (ref 99–110)
CO2: 28 MMOL/L (ref 21–32)
CREAT SERPL-MCNC: 1.1 MG/DL (ref 0.6–1.2)
GFR AFRICAN AMERICAN: 58
GFR NON-AFRICAN AMERICAN: 48
GLUCOSE BLD-MCNC: 104 MG/DL (ref 70–99)
POTASSIUM SERPL-SCNC: 3.4 MMOL/L (ref 3.5–5.1)
SODIUM BLD-SCNC: 142 MMOL/L (ref 136–145)

## 2019-04-03 PROCEDURE — 6360000002 HC RX W HCPCS: Performed by: STUDENT IN AN ORGANIZED HEALTH CARE EDUCATION/TRAINING PROGRAM

## 2019-04-03 PROCEDURE — 2580000003 HC RX 258: Performed by: FAMILY MEDICINE

## 2019-04-03 PROCEDURE — 6370000000 HC RX 637 (ALT 250 FOR IP): Performed by: INTERNAL MEDICINE

## 2019-04-03 PROCEDURE — 99233 SBSQ HOSP IP/OBS HIGH 50: CPT | Performed by: NURSE PRACTITIONER

## 2019-04-03 PROCEDURE — 6370000000 HC RX 637 (ALT 250 FOR IP): Performed by: FAMILY MEDICINE

## 2019-04-03 PROCEDURE — 80048 BASIC METABOLIC PNL TOTAL CA: CPT

## 2019-04-03 RX ORDER — FLECAINIDE ACETATE 150 MG/1
75 TABLET ORAL 2 TIMES DAILY
Qty: 60 TABLET | Refills: 0 | Status: SHIPPED | OUTPATIENT
Start: 2019-04-03 | End: 2020-06-29

## 2019-04-03 RX ORDER — POTASSIUM CHLORIDE 29.8 MG/ML
20 INJECTION INTRAVENOUS
Status: COMPLETED | OUTPATIENT
Start: 2019-04-03 | End: 2019-04-03

## 2019-04-03 RX ADMIN — MINOXIDIL 2.5 MG: 2.5 TABLET ORAL at 11:17

## 2019-04-03 RX ADMIN — FLECAINIDE ACETATE 75 MG: 50 TABLET ORAL at 11:18

## 2019-04-03 RX ADMIN — METOPROLOL SUCCINATE 25 MG: 25 TABLET, EXTENDED RELEASE ORAL at 11:18

## 2019-04-03 RX ADMIN — POTASSIUM CHLORIDE 20 MEQ: 29.8 INJECTION, SOLUTION INTRAVENOUS at 17:12

## 2019-04-03 RX ADMIN — LOSARTAN POTASSIUM 100 MG: 25 TABLET, FILM COATED ORAL at 11:18

## 2019-04-03 RX ADMIN — PANTOPRAZOLE SODIUM 40 MG: 40 TABLET, DELAYED RELEASE ORAL at 06:57

## 2019-04-03 RX ADMIN — TIMOLOL MALEATE 1 DROP: 5 SOLUTION OPHTHALMIC at 11:18

## 2019-04-03 RX ADMIN — SODIUM CHLORIDE, PRESERVATIVE FREE 10 ML: 5 INJECTION INTRAVENOUS at 11:19

## 2019-04-03 RX ADMIN — POTASSIUM CHLORIDE 20 MEQ: 29.8 INJECTION, SOLUTION INTRAVENOUS at 14:43

## 2019-04-03 ASSESSMENT — PAIN SCALES - GENERAL
PAINLEVEL_OUTOF10: 0

## 2019-04-03 NOTE — DISCHARGE SUMMARY
Inpatient Family Medicine Service   Discharge Summary      Patient ID: Alver Hamper    Patient's PCP: Kofi Woodard MD    Admit Date: 4/1/2019   Discharge Date:   4/3/2019     Admitting Physician: Alessio Toussaint DO  Discharge Physician: Luke Hinton DO     Discharge Diagnoses: Active Hospital Problems    Diagnosis Date Noted    HTN (hypertension), malignant [I10]     Hypertensive left ventricular hypertrophy, without heart failure [U17.4]     Diastolic dysfunction [Z21.9]     Chest pain [R07.9] 04/01/2019    Hypokalemia [E87.6] 02/05/2012    PAF (paroxysmal atrial fibrillation) ([facility] [address] [I48.0] 09/06/2011       The patient was seen and examined on day of discharge and this discharge summary is in conjunction with any daily progress note from day of discharge. Hospital Course:   66 y.o. female who presented to [facility] with chest pain. Pt reported that she has been having chest pain intermittently for 4 days prior but that it worsened the day prior to admission. Has a history of atrial fibrillation which has been well controlled for several years. Pain was localized to substernal and left chest regions with radiation up to her left neck.      While in the ED, EKG showed normal sinus rhythm with LVH and repolarization abnormality. Patient was not in atrial fibrillation. Troponin levels were negative. Chest pain resolved upon admission. Patient was found to be hypertensive, intermittently to 220s, despite being on metoprolol, losartan, and minoxidil. Cardiology was consulted and Flecainide was increased to 75mg BID. Patient underwent a stress test which represented a low-risk study. Patient did have a hypertensive episode with systolic in 118J. She became bradycardic and hypotensive with IV labetalol 10mg which improved with IV fluid bolus. A renal duplex study demonstrated no signs of renal artery stenosis bilaterally.  Patient was noted to be hypokalemic (K 3.4) with no improvement with 40mEq of KCl IV due to patient stating having GI issues with PO potassium in past. She received an additional 40mEq of KCl IV prior to discharge. Patient continued to be asymptomatic. Patient stable for discharge to home with continuing BP meds and new dosage of Flecainide. Patient was instructed to follow-up with her PCP in 1 week and her cardiologist (Dr. Nehemiah Wright) within 1 month. Physical Exam Performed:     BP (!) 162/78   Pulse 67   Temp 97.4 °F (36.3 °C) (Oral)   Resp 18   Ht 5' (1.524 m)   Wt 112 lb 11.2 oz (51.1 kg)   SpO2 96%   BMI 22.01 kg/m²          General appearance: No apparent distress, appears stated age and cooperative. HEENT: Pupils equal, round, and reactive to light. Conjunctivae/corneas clear. Neck: Supple, with full range of motion. No jugular venous distention. Trachea midline. Respiratory:  Normal respiratory effort. Clear to auscultation, bilaterally without Rales/Wheezes/Rhonchi. Cardiovascular: Regular rate and rhythm with normal S1/S2 without murmurs, rubs or gallops. Abdomen: Soft, non-tender, non-distended with normal bowel sounds. Musculoskeletal: No clubbing, cyanosis or edema bilaterally. Full range of motion without deformity. Skin: Skin color, texture, turgor normal.  No rashes or lesions. Neurologic:  Neurovascularly intact without any focal sensory/motor deficits. Cranial nerves: II-XII intact, grossly non-focal.  Psychiatric: Alert and oriented, thought content appropriate, normal insight      Labs:  For convenience and continuity at follow-up the following most recent labs are provided:      CBC:    Lab Results   Component Value Date    WBC 4.2 04/02/2019    HGB 13.2 04/02/2019    HCT 38.3 04/02/2019     04/02/2019       Renal:    Lab Results   Component Value Date     04/03/2019    K 3.4 04/03/2019    K 3.4 04/02/2019     04/03/2019    CO2 28 04/03/2019    BUN 12 04/03/2019    CREATININE 1.1 04/03/2019    CALCIUM 8.5 04/03/2019    PHOS 2.0 07/29/2015         Significant Diagnostic Studies    Radiology:   VL Renal Arterial Duplex Complete   Final Result      NM Cardiac Stress Test Nuclear Imaging   Final Result      XR CHEST PORTABLE   Final Result   No acute cardiopulmonary disease or significant interval change from prior   study 05/20/2017                Consults:     IP CONSULT TO HOSPITALIST  IP CONSULT TO CARDIOLOGY    Disposition:  Discharge to home     Condition at Discharge: Stable    Discharge Instructions/Follow-up:  Pt to follow-up with PCP in 1 week and cardiologist within 1 month. Code Status:  Full Code     Activity: activity as tolerated    Diet: regular diet      Discharge Medications:     Current Discharge Medication List           Details   flecainide (TAMBOCOR) 150 MG tablet Take 0.5 tablets by mouth 2 times daily  Qty: 60 tablet, Refills: 0              Details   ondansetron (ZOFRAN) 4 MG tablet Take 1 tablet by mouth every 8 hours as needed for Nausea or Vomiting  Qty: 20 tablet, Refills: 0      amitriptyline (ELAVIL) 10 MG tablet Take 10 mg by mouth nightly      meclizine (ANTIVERT) 25 MG tablet Take 25 mg by mouth 3 times daily as needed. minoxidil (LONITEN) 2.5 MG tablet Take 1 tablet by mouth 2 times daily for 90 days. Qty: 60 tablet, Refills: 2      metoprolol (TOPROL-XL) 50 MG XL tablet Take 25 mg by mouth daily. olmesartan (BENICAR) 40 MG tablet Take 40 mg by mouth daily. omeprazole (PRILOSEC) 20 MG capsule Take 40 mg by mouth daily. timolol (TIMOPTIC-XR) 0.5 % ophthalmic gel-forming Place 1 drop into both eyes 2 times daily. Time Spent on discharge is more than 45 minutes in the examination, evaluation, counseling and review of medications and discharge plan. This patient was seen and evaluated with attending, Dr. Homero Contreras. Signed:    Jarrett Prakash DO   4/3/2019      Thank you Eloise Almeida MD for the opportunity to be involved in this patient's care.  If you have any questions or concerns please feel free to contact me at [phone].

## 2019-04-03 NOTE — PROGRESS NOTES
VSS - afebrile; hypertensive. Pt is alert and oriented x 4 with history of falls. Assessment completed as charted. Bed is in lowest position with 2/4 bed rails raised, bed alarm turned on, wheels locked and call light within reach - patient wearing non-skid socks and verbalizes understanding to call out for assistance.  at the bedside. No further requests at this time. Will continue to monitor.      Vitals:    04/02/19 1937   BP: (!) 193/85   Pulse: 73   Resp: 18   Temp: 97.9 °F (36.6 °C)   SpO2: 98%

## 2019-04-03 NOTE — PROGRESS NOTES
in the bilateral upper and lower poles are within    normal limits.    3. Kidney size is small bilaterally suggesting medical renal disease.           NM Stress Test- 4/2/2019:  Summary  Abnormal EKG response. Normal LV function. There is normal isotope uptake at stress and rest.  There is no evidence of  myocardial ischemia or scar. Overall findings represent a low risk (abnormal) study. Echo- 4/1/2019:   Summary   Normal LV systolic function with ejection fraction of 55-60%. No regional wall motion abnormalites are seen. Mild concentric left ventricular hypertrophy. Evidence for type II diastolic dysfunction with elevated filling pressure. Mild mitral, aortic, and tricuspid regurgitation. Systolic pulmonic artery pressure (SPAP) is estimated at 41mmHg consistent   with mild pulmonary hypertension (RAP of 3mmHg). Echo- 11/14/2014  Summary:  Overall left ventricular ejection fraction is estimated to be 60-65%. The left ventricular wall motion is normal.  Mild aortic regurgitation. Right ventricular systolic pressure is mildly elevated at 35-45 mmHg. Mild tricuspid regurgitation is present. Mild pulmonic valvular regurgitation. There is moderate mitral regurgitation. Catheter/Port noted in right atrium ?   Left atrial size is at the upper limits of normal.        Chest X-Ray- 4/1/2019:  Impression:        No acute cardiopulmonary disease or significant interval change from prior  study 05/20/2017

## 2019-04-03 NOTE — PROGRESS NOTES
Inpatient Family Medicine   Progress Note      PCP: Lizandro Philip MD    Date of Admission: 4/1/2019    Chief Complaint: Chest pain    Hospital Course: In ED, EKG showed normal sinus rhythm with LVH and repolarization abnormality. Not in atrial fibrillation. Troponin levels negative. Echocardiogram with normal EF 55-60%, mild LVH, and mild mitral/aortic/tricuspid regurgitation. Seen by cardiology. Performed stress test on 4/2 which was abnormal. Patient continued to be hypertensive to 220s and received one dose of IV labetalol 10mg on 4/2 with results bradycardia and drop of BP to 88/50 which resolved with 500cc bolus and oxygen. Subjective: Pt resting comfortably in bed. States that she is feeling much better then yesterday afternoon when she had bradycardic episode. She has been able to eat some Cheerios and scrambled eggs this AM. States that she always feels \"yucky\" since her chemotherapy many years ago which is her baseline. Denies any CP/SOB, vomiting, abdominal pain, diarrhea, or constipation. Pt states that she is ready for discharge.      Medications:  Reviewed    Infusion Medications   Scheduled Medications    amitriptyline  10 mg Oral Nightly    metoprolol succinate  25 mg Oral Daily    minoxidil  2.5 mg Oral BID    losartan  100 mg Oral Daily    pantoprazole  40 mg Oral QAM AC    timolol  1 drop Both Eyes BID    sodium chloride flush  10 mL Intravenous 2 times per day    aspirin  81 mg Oral Daily    enoxaparin  40 mg Subcutaneous Daily    [Held by provider] flecainide  75 mg Oral BID    potassium chloride  40 mEq Oral Once     PRN Meds: ondansetron, meclizine, sodium chloride flush, magnesium hydroxide, acetaminophen, nitroGLYCERIN      Intake/Output Summary (Last 24 hours) at 4/3/2019 0782  Last data filed at 4/3/2019 9793  Gross per 24 hour   Intake 120 ml   Output 750 ml   Net -630 ml       Physical Exam Performed:    /68   Pulse 60   Temp 97.9 °F (36.6 °C) (Oral)   Resp cardiopulmonary disease or significant interval change from prior   study 05/20/2017           Echocardiogram (4/1/19)  Normal LV systolic function with ejection fraction of 55-60%. No regional wall motion abnormalites are seen. Mild concentric left ventricular hypertrophy. Evidence for type II diastolic dysfunction with elevated filling pressure. Mild mitral, aortic, and tricuspid regurgitation. Systolic pulmonic artery pressure (SPAP) is estimated at 41mmHg consistent with mild pulmonary hypertension (RAP of 3mmHg). Renal US (4/2/19)  Summary        1. There is no evidence to suggest renal artery stenosis bilaterally.    2. Resistive indexes in the bilateral upper and lower poles are within    normal limits.    3. Kidney size is small bilaterally suggesting medical renal disease. Stress Test (4/2/19)  Summary    Abnormal EKG response.    Normal LV function.    There is normal isotope uptake at stress and rest. There is no evidence of    myocardial ischemia or scar.    Overall findings represent a low risk (abnormal) study. Assessment/Plan:    Active Hospital Problems    Diagnosis Date Noted    Chest pain [R07.9] 04/01/2019    Hypokalemia [E87.6] 02/05/2012    PAF (paroxysmal atrial fibrillation) ([facility] [address] [I48.0] 09/06/2011     Suyapa White is a 66 y.o.  Hospital Day #3 admitted for chest pain.     -- Chest pain              - Currently stable              - Continue continuous telemetry              - EKG negative for acute MI, troponins negative x 2   - Echo with normal EF and mild regurgitation as noted above               - Cardiology following                          - S/p nuclear stress test with results as noted above                          - Increased Flecainide to 75mg BID on 4/1     - OK to discharge to home from cardiology standpoint with plan for f/u with Dr. Morenita Arvizu within 1 month     -- Atrial fibrillation              - Cardiology following              - A fib not seen on initial

## 2019-08-22 ENCOUNTER — HOSPITAL ENCOUNTER (OUTPATIENT)
Age: 74
Discharge: HOME | End: 2019-08-22
Payer: MEDICARE

## 2019-08-22 LAB
A/G RATIO: 1.6 (ref 1.1–2.2)
ALBUMIN SERPL-MCNC: 4.2 G/DL (ref 3.4–5)
ALP BLD-CCNC: 126 U/L (ref 40–129)
ALT SERPL-CCNC: 7 U/L (ref 10–40)
ANION GAP SERPL CALCULATED.3IONS-SCNC: 9 MMOL/L (ref 3–16)
AST SERPL-CCNC: 18 U/L (ref 15–37)
BASOPHILS ABSOLUTE: 0 K/UL (ref 0–0.2)
BASOPHILS RELATIVE PERCENT: 0.8 %
BILIRUB SERPL-MCNC: 0.4 MG/DL (ref 0–1)
BUN BLDV-MCNC: 10 MG/DL (ref 7–20)
CALCIUM SERPL-MCNC: 9.2 MG/DL (ref 8.3–10.6)
CHLORIDE BLD-SCNC: 107 MMOL/L (ref 99–110)
CO2: 28 MMOL/L (ref 21–32)
CREAT SERPL-MCNC: 1 MG/DL (ref 0.6–1.2)
EOSINOPHILS ABSOLUTE: 0.1 K/UL (ref 0–0.6)
EOSINOPHILS RELATIVE PERCENT: 2.1 %
GFR AFRICAN AMERICAN: >60
GFR NON-AFRICAN AMERICAN: 54
GLOBULIN: 2.6 G/DL
GLUCOSE BLD-MCNC: 121 MG/DL (ref 70–99)
HCT VFR BLD CALC: 38.5 % (ref 36–48)
HEMOGLOBIN: 13.3 G/DL (ref 12–16)
LYMPHOCYTES ABSOLUTE: 1.1 K/UL (ref 1–5.1)
LYMPHOCYTES RELATIVE PERCENT: 27.1 %
MCH RBC QN AUTO: 32.2 PG (ref 26–34)
MCHC RBC AUTO-ENTMCNC: 34.6 G/DL (ref 31–36)
MCV RBC AUTO: 93.2 FL (ref 80–100)
MONOCYTES ABSOLUTE: 0.5 K/UL (ref 0–1.3)
MONOCYTES RELATIVE PERCENT: 13.6 %
NEUTROPHILS ABSOLUTE: 2.2 K/UL (ref 1.7–7.7)
NEUTROPHILS RELATIVE PERCENT: 56.4 %
PDW BLD-RTO: 13.2 % (ref 12.4–15.4)
PLATELET # BLD: 117 K/UL (ref 135–450)
PMV BLD AUTO: 9.7 FL (ref 5–10.5)
POTASSIUM SERPL-SCNC: 3.5 MMOL/L (ref 3.5–5.1)
RBC # BLD: 4.13 M/UL (ref 4–5.2)
SODIUM BLD-SCNC: 144 MMOL/L (ref 136–145)
TOTAL PROTEIN: 6.8 G/DL (ref 6.4–8.2)
WBC # BLD: 3.9 K/UL (ref 4–11)

## 2019-08-22 PROCEDURE — 85025 COMPLETE CBC W/AUTO DIFF WBC: CPT

## 2019-08-22 PROCEDURE — 36591 DRAW BLOOD OFF VENOUS DEVICE: CPT

## 2019-08-22 PROCEDURE — 80053 COMPREHEN METABOLIC PANEL: CPT

## 2019-08-22 PROCEDURE — 36415 COLL VENOUS BLD VENIPUNCTURE: CPT

## 2019-12-16 ENCOUNTER — OFFICE VISIT (OUTPATIENT)
Dept: ORTHOPEDIC SURGERY | Age: 74
End: 2019-12-16
Payer: MEDICARE

## 2019-12-16 VITALS — HEIGHT: 60 IN | BODY MASS INDEX: 22.12 KG/M2 | WEIGHT: 112.66 LBS

## 2019-12-16 DIAGNOSIS — M70.61 GREATER TROCHANTERIC BURSITIS OF RIGHT HIP: ICD-10-CM

## 2019-12-16 DIAGNOSIS — M25.551 PAIN OF RIGHT HIP JOINT: Primary | ICD-10-CM

## 2019-12-16 PROCEDURE — 99203 OFFICE O/P NEW LOW 30 MIN: CPT | Performed by: ORTHOPAEDIC SURGERY

## 2019-12-16 PROCEDURE — G8420 CALC BMI NORM PARAMETERS: HCPCS | Performed by: ORTHOPAEDIC SURGERY

## 2019-12-16 PROCEDURE — 1090F PRES/ABSN URINE INCON ASSESS: CPT | Performed by: ORTHOPAEDIC SURGERY

## 2019-12-16 PROCEDURE — G8484 FLU IMMUNIZE NO ADMIN: HCPCS | Performed by: ORTHOPAEDIC SURGERY

## 2019-12-16 PROCEDURE — 1123F ACP DISCUSS/DSCN MKR DOCD: CPT | Performed by: ORTHOPAEDIC SURGERY

## 2019-12-16 PROCEDURE — G8427 DOCREV CUR MEDS BY ELIG CLIN: HCPCS | Performed by: ORTHOPAEDIC SURGERY

## 2019-12-16 PROCEDURE — 1036F TOBACCO NON-USER: CPT | Performed by: ORTHOPAEDIC SURGERY

## 2019-12-16 PROCEDURE — 4040F PNEUMOC VAC/ADMIN/RCVD: CPT | Performed by: ORTHOPAEDIC SURGERY

## 2019-12-16 PROCEDURE — G8400 PT W/DXA NO RESULTS DOC: HCPCS | Performed by: ORTHOPAEDIC SURGERY

## 2020-01-30 ENCOUNTER — APPOINTMENT (OUTPATIENT)
Dept: GENERAL RADIOLOGY | Age: 75
End: 2020-01-30
Payer: MEDICARE

## 2020-01-30 ENCOUNTER — HOSPITAL ENCOUNTER (EMERGENCY)
Age: 75
Discharge: HOME | End: 2020-01-30
Attending: EMERGENCY MEDICINE
Payer: MEDICARE

## 2020-01-30 VITALS
HEART RATE: 70 BPM | BODY MASS INDEX: 22.38 KG/M2 | DIASTOLIC BLOOD PRESSURE: 77 MMHG | TEMPERATURE: 97.4 F | RESPIRATION RATE: 15 BRPM | HEIGHT: 60 IN | SYSTOLIC BLOOD PRESSURE: 191 MMHG | OXYGEN SATURATION: 95 % | WEIGHT: 114 LBS

## 2020-01-30 LAB
A/G RATIO: 1.3 (ref 1.1–2.2)
ALBUMIN SERPL-MCNC: 3.8 G/DL (ref 3.4–5)
ALP BLD-CCNC: 139 U/L (ref 40–129)
ALT SERPL-CCNC: 9 U/L (ref 10–40)
ANION GAP SERPL CALCULATED.3IONS-SCNC: 13 MMOL/L (ref 3–16)
AST SERPL-CCNC: 22 U/L (ref 15–37)
BASOPHILS ABSOLUTE: 0.1 K/UL (ref 0–0.2)
BASOPHILS RELATIVE PERCENT: 1.4 %
BILIRUB SERPL-MCNC: 0.7 MG/DL (ref 0–1)
BILIRUBIN URINE: NEGATIVE
BLOOD, URINE: NEGATIVE
BUN BLDV-MCNC: 9 MG/DL (ref 7–20)
CALCIUM SERPL-MCNC: 8.8 MG/DL (ref 8.3–10.6)
CHLORIDE BLD-SCNC: 98 MMOL/L (ref 99–110)
CLARITY: CLEAR
CO2: 27 MMOL/L (ref 21–32)
COLOR: YELLOW
CREAT SERPL-MCNC: 1 MG/DL (ref 0.6–1.2)
EOSINOPHILS ABSOLUTE: 0.1 K/UL (ref 0–0.6)
EOSINOPHILS RELATIVE PERCENT: 2.2 %
GFR AFRICAN AMERICAN: >60
GFR NON-AFRICAN AMERICAN: 54
GLOBULIN: 3 G/DL
GLUCOSE BLD-MCNC: 116 MG/DL (ref 70–99)
GLUCOSE URINE: NEGATIVE MG/DL
HCT VFR BLD CALC: 43.2 % (ref 36–48)
HEMOGLOBIN: 14.5 G/DL (ref 12–16)
KETONES, URINE: NEGATIVE MG/DL
LEUKOCYTE ESTERASE, URINE: NEGATIVE
LIPASE: 12 U/L (ref 13–60)
LYMPHOCYTES ABSOLUTE: 0.8 K/UL (ref 1–5.1)
LYMPHOCYTES RELATIVE PERCENT: 14.4 %
MCH RBC QN AUTO: 31.5 PG (ref 26–34)
MCHC RBC AUTO-ENTMCNC: 33.7 G/DL (ref 31–36)
MCV RBC AUTO: 93.5 FL (ref 80–100)
MICROSCOPIC EXAMINATION: NORMAL
MONOCYTES ABSOLUTE: 0.5 K/UL (ref 0–1.3)
MONOCYTES RELATIVE PERCENT: 8.9 %
NEUTROPHILS ABSOLUTE: 4.1 K/UL (ref 1.7–7.7)
NEUTROPHILS RELATIVE PERCENT: 73.1 %
NITRITE, URINE: NEGATIVE
PDW BLD-RTO: 13 % (ref 12.4–15.4)
PH UA: 7.5 (ref 5–8)
PLATELET # BLD: 124 K/UL (ref 135–450)
PMV BLD AUTO: 9.7 FL (ref 5–10.5)
POTASSIUM SERPL-SCNC: 4.5 MMOL/L (ref 3.5–5.1)
PROTEIN UA: NEGATIVE MG/DL
RBC # BLD: 4.62 M/UL (ref 4–5.2)
SODIUM BLD-SCNC: 138 MMOL/L (ref 136–145)
SPECIFIC GRAVITY UA: 1.01 (ref 1–1.03)
TOTAL PROTEIN: 6.8 G/DL (ref 6.4–8.2)
URINE REFLEX TO CULTURE: NORMAL
URINE TYPE: NORMAL
UROBILINOGEN, URINE: 0.2 E.U./DL
WBC # BLD: 5.6 K/UL (ref 4–11)

## 2020-01-30 PROCEDURE — 2580000003 HC RX 258: Performed by: EMERGENCY MEDICINE

## 2020-01-30 PROCEDURE — 99284 EMERGENCY DEPT VISIT MOD MDM: CPT

## 2020-01-30 PROCEDURE — 96360 HYDRATION IV INFUSION INIT: CPT

## 2020-01-30 PROCEDURE — 73502 X-RAY EXAM HIP UNI 2-3 VIEWS: CPT

## 2020-01-30 PROCEDURE — 80053 COMPREHEN METABOLIC PANEL: CPT

## 2020-01-30 PROCEDURE — 85025 COMPLETE CBC W/AUTO DIFF WBC: CPT

## 2020-01-30 PROCEDURE — 6370000000 HC RX 637 (ALT 250 FOR IP): Performed by: EMERGENCY MEDICINE

## 2020-01-30 PROCEDURE — 81003 URINALYSIS AUTO W/O SCOPE: CPT

## 2020-01-30 PROCEDURE — 83690 ASSAY OF LIPASE: CPT

## 2020-01-30 PROCEDURE — 96361 HYDRATE IV INFUSION ADD-ON: CPT

## 2020-01-30 RX ORDER — ONDANSETRON 4 MG/1
4 TABLET, ORALLY DISINTEGRATING ORAL EVERY 8 HOURS PRN
Qty: 12 TABLET | Refills: 0 | Status: SHIPPED | OUTPATIENT
Start: 2020-01-30

## 2020-01-30 RX ORDER — 0.9 % SODIUM CHLORIDE 0.9 %
1000 INTRAVENOUS SOLUTION INTRAVENOUS ONCE
Status: COMPLETED | OUTPATIENT
Start: 2020-01-30 | End: 2020-01-30

## 2020-01-30 RX ORDER — ACETAMINOPHEN 325 MG/1
650 TABLET ORAL ONCE
Status: COMPLETED | OUTPATIENT
Start: 2020-01-30 | End: 2020-01-30

## 2020-01-30 RX ADMIN — ACETAMINOPHEN 650 MG: 325 TABLET, FILM COATED ORAL at 06:53

## 2020-01-30 RX ADMIN — SODIUM CHLORIDE 1000 ML: 9 INJECTION, SOLUTION INTRAVENOUS at 06:52

## 2020-01-30 ASSESSMENT — PAIN SCALES - GENERAL: PAINLEVEL_OUTOF10: 0

## 2020-01-30 NOTE — ED PROVIDER NOTES
CHIEF COMPLAINT  Diarrhea (Started tonight. Denies abdominal pain. )      HISTORY OF PRESENT ILLNESS  Marjorie Fallon is a 66 y.o. female presents to the ED with fall,was getting up to go to the bathroom, tripped and fell, no head injury, no syncope, landed on her L side, L hip pain just PTA, brought in by EMS,  here w/ her, N/V/D, started yesterday. nonbloody nonbilious emesis, given zofran en route and nausea has improved, diarrhea nonbloody, watery, no melena/hematochezia, no significant abd pain, just a little cramping before those few episodes of diarrhea, no fevers, no cough/URI sx, does not think this could be food poisoning, somewhat decreased urination throughout the day, compared to normal, but no difficulty urinating or dysuria/hematuria, No other complaints, modifying factors or associated symptoms. I have reviewed the following from the nursing documentation.     Past Medical History:   Diagnosis Date    Abnormal LFT's     Arthritis     Atrial fibrillation ([address]     Follicular lymphoma (HCC)     Dr Naida Vergara GERD (gastroesophageal reflux disease)     Glaucoma     Gout 11/2/2010    Hypertension     Liver disease     steatohepatitis    Migraine     Non-ulcer dyspepsia     egd neg 11/2011    Peripheral vascular disease ([address]     Thyroid disease      Past Surgical History:   Procedure Laterality Date    APPENDECTOMY      CATARACT REMOVAL      2007    CHOLECYSTECTOMY      2005    COLONOSCOPY  2/27/2015    polyp    ENDOSCOPY, COLON, DIAGNOSTIC  11/14/2011    bx stomach polyp    HYSTERECTOMY      LYMPH NODE BIOPSY      THYROIDECTOMY      76    TONSILLECTOMY      UPPER GASTROINTESTINAL ENDOSCOPY      UPPER GASTROINTESTINAL ENDOSCOPY  2/27/2015    gastric polyp     Family History   Problem Relation Age of Onset    Heart Disease Mother     Cancer Sister     Cancer Brother      Social History     Socioeconomic History    Marital status:      Spouse name: Not on file    Number of children: Not on file    Years of education: Not on file    Highest education level: Not on file   Occupational History    Not on file   Social Needs    Financial resource strain: Not on file    Food insecurity:     Worry: Not on file     Inability: Not on file    Transportation needs:     Medical: Not on file     Non-medical: Not on file   Tobacco Use    Smoking status: Never Smoker    Smokeless tobacco: Never Used   Substance and Sexual Activity    Alcohol use: No    Drug use: No    Sexual activity: Yes   Lifestyle    Physical activity:     Days per week: Not on file     Minutes per session: Not on file    Stress: Not on file   Relationships    Social connections:     Talks on phone: Not on file     Gets together: Not on file     Attends Faith service: Not on file     Active member of club or organization: Not on file     Attends meetings of clubs or organizations: Not on file     Relationship status: Not on file    Intimate partner violence:     Fear of current or ex partner: Not on file     Emotionally abused: Not on file     Physically abused: Not on file     Forced sexual activity: Not on file   Other Topics Concern    Not on file   Social History Narrative    Not on file     No current facility-administered medications for this encounter. Current Outpatient Medications   Medication Sig Dispense Refill    ondansetron (ZOFRAN ODT) 4 MG disintegrating tablet Take 1 tablet by mouth every 8 hours as needed for Nausea or Vomiting 12 tablet 0    flecainide (TAMBOCOR) 150 MG tablet Take 0.5 tablets by mouth 2 times daily 60 tablet 0    ondansetron (ZOFRAN) 4 MG tablet Take 1 tablet by mouth every 8 hours as needed for Nausea or Vomiting 20 tablet 0    minoxidil (LONITEN) 2.5 MG tablet Take 1 tablet by mouth 2 times daily for 90 days. 60 tablet 2    metoprolol (TOPROL-XL) 50 MG XL tablet Take 25 mg by mouth daily.       olmesartan (BENICAR) 40 MG tablet Take 40 mg by mouth Monocytes % 8.9 %    Eosinophils % 2.2 %    Basophils % 1.4 %    Neutrophils Absolute 4.1 1.7 - 7.7 K/uL    Lymphocytes Absolute 0.8 (L) 1.0 - 5.1 K/uL    Monocytes Absolute 0.5 0.0 - 1.3 K/uL    Eosinophils Absolute 0.1 0.0 - 0.6 K/uL    Basophils Absolute 0.1 0.0 - 0.2 K/uL   Urine, reflex to culture   Result Value Ref Range    Color, UA Yellow Straw/Yellow    Clarity, UA Clear Clear    Glucose, Ur Negative Negative mg/dL    Bilirubin Urine Negative Negative    Ketones, Urine Negative Negative mg/dL    Specific Gravity, UA 1.015 1.005 - 1.030    Blood, Urine Negative Negative    pH, UA 7.5 5.0 - 8.0    Protein, UA Negative Negative mg/dL    Urobilinogen, Urine 0.2 <2.0 E.U./dL    Nitrite, Urine Negative Negative    Leukocyte Esterase, Urine Negative Negative    Microscopic Examination Not Indicated     Urine Type NotGiven     Urine Reflex to Culture Not Indicated    Comprehensive Metabolic Panel   Result Value Ref Range    Sodium 138 136 - 145 mmol/L    Potassium 4.5 3.5 - 5.1 mmol/L    Chloride 98 (L) 99 - 110 mmol/L    CO2 27 21 - 32 mmol/L    Anion Gap 13 3 - 16    Glucose 116 (H) 70 - 99 mg/dL    BUN 9 7 - 20 mg/dL    CREATININE 1.0 0.6 - 1.2 mg/dL    GFR Non-African American 54 (A) >60    GFR African American >60 >60    Calcium 8.8 8.3 - 10.6 mg/dL    Total Protein 6.8 6.4 - 8.2 g/dL    Alb 3.8 3.4 - 5.0 g/dL    Albumin/Globulin Ratio 1.3 1.1 - 2.2    Total Bilirubin 0.7 0.0 - 1.0 mg/dL    Alkaline Phosphatase 139 (H) 40 - 129 U/L    ALT 9 (L) 10 - 40 U/L    AST 22 15 - 37 U/L    Globulin 3.0 g/dL   Lipase   Result Value Ref Range    Lipase 12.0 (L) 13.0 - 60.0 U/L       RADIOLOGY  Xr Hip 2-3 Vw W Pelvis Left    Result Date: 1/30/2020  EXAMINATION: ONE XRAY VIEW OF THE PELVIS AND TWO XRAY VIEWS LEFT HIP 1/30/2020 6:02 am COMPARISON: Radiographs of the pelvis and right hip 12/16/2019, PET-CT 08/15/2017.  HISTORY: ORDERING SYSTEM PROVIDED HISTORY: L hip pain, fall TECHNOLOGIST PROVIDED HISTORY: Reason for exam:-> L hip pain, fall Reason for Exam: fall left hip pain Acuity: Acute Type of Exam: Initial FINDINGS: Overlying artifact likely from clothing somewhat limits evaluation of fine bony detail. Within these limitations: There is no acute fracture or dislocation. The hip joint spaces are preserved. Degenerative changes are noted in the lower lumbar spine. 1. Somewhat limited evaluation as described above. 2. No acute fracture or dislocation. ED COURSE/MDM  Patient seen and evaluated. Old records reviewed. Labs and imaging reviewed and results discussed with patient. 66yo F s/p fall, L hip pain, xr without obvious acute process though imaging limited, no syncope, mechanical, labs unremarkable, LFTs not worsening, this was on her prior diagnosis list, N/V/D, no further diarrhea while here, declined flu swab,  patient/ would not accept anything except tylenol for pain and the fluids for her mild dehydration, no current nausea and was feeling better after zofran en route, but given script for home in case this would recur, told to f/u about her blood pressure, was running high here, no evidence of end organ damage at this time, offered admission for observation, but patient wanted to go home, patient's  wanted me to call and get permission from her PCP prior to giving her any medication, when presenting at 4:34 AM I did not feel it necessary to contact her PCP for a simple ED visit given her improvement, I made sure she could ambulate w/ steady gait prior to d/c, Strict return precautions given, all questions answered, will return if any worsening symptoms or new concerns, see AVS for further discharge information, patient verbalized understanding of plan, felt comfortable going home.     Orders Placed This Encounter   Procedures    XR HIP 2-3 VW W PELVIS LEFT    CBC auto differential    Urine, reflex to culture    Comprehensive Metabolic Panel    Lipase     Orders Placed This Encounter Medications    0.9 % sodium chloride bolus    acetaminophen (TYLENOL) tablet 650 mg    ondansetron (ZOFRAN ODT) 4 MG disintegrating tablet     Sig: Take 1 tablet by mouth every 8 hours as needed for Nausea or Vomiting     Dispense:  12 tablet     Refill:  0     ED Course as of Feb 16 0551   Thu Jan 30, 2020   0628 Patient's  refused any blood pressure medication, even her own home meds. [SY]   [id number] She was given Zofran in route, stated the nausea had improved. Declined any other medication besides the fluids and Tylenol.    [SY]   [id number] Lab reports the CMP has hemolyzed x2, nurse trying again.    [SY]   2639 Patient was feeling much better after Tylenol. Will attempt to ambulate after fluids completed. [SY]      ED Course User Index  [SY] Little Locke DO       Patient was given scripts for the following medications. I counseled patient how to take these medications. Discharge Medication List as of 1/30/2020  9:36 AM      START taking these medications    Details   ondansetron (ZOFRAN ODT) 4 MG disintegrating tablet Take 1 tablet by mouth every 8 hours as needed for Nausea or Vomiting, Disp-12 tablet, R-0Print             CLINICAL IMPRESSION  1. Fall, initial encounter    2. Nausea vomiting and diarrhea    3. Left hip pain    4. Poorly-controlled hypertension    5. Mild dehydration        Blood pressure (!) 191/77, pulse 70, temperature 97.4 °F (36.3 °C), temperature source Oral, resp. rate 15, height 5' (1.524 m), weight 114 lb (51.7 kg), SpO2 95 %, not currently breastfeeding. DISPOSITION  Marjorie Fallon was discharged to home in stable condition.                   Little Locke DO  02/16/20 2733

## 2020-06-29 ENCOUNTER — APPOINTMENT (OUTPATIENT)
Dept: GENERAL RADIOLOGY | Age: 75
DRG: 481 | End: 2020-06-29
Payer: MEDICARE

## 2020-06-29 ENCOUNTER — APPOINTMENT (OUTPATIENT)
Dept: CT IMAGING | Age: 75
DRG: 481 | End: 2020-06-29
Payer: MEDICARE

## 2020-06-29 ENCOUNTER — HOSPITAL ENCOUNTER (INPATIENT)
Age: 75
LOS: 7 days | Discharge: NURSING, REHAB OR LONG-TERM CARE | DRG: 481 | End: 2020-07-06
Attending: EMERGENCY MEDICINE | Admitting: HOSPITALIST
Payer: MEDICARE

## 2020-06-29 PROBLEM — S72.001A CLOSED RIGHT HIP FRACTURE, INITIAL ENCOUNTER (HCC): Status: ACTIVE | Noted: 2020-06-29

## 2020-06-29 LAB
A/G RATIO: 1.8 (ref 1.1–2.2)
ALBUMIN SERPL-MCNC: 4.4 G/DL (ref 3.4–5)
ALP BLD-CCNC: 161 U/L (ref 40–129)
ALT SERPL-CCNC: 14 U/L (ref 10–40)
ANION GAP SERPL CALCULATED.3IONS-SCNC: 16 MMOL/L (ref 3–16)
AST SERPL-CCNC: 26 U/L (ref 15–37)
BASOPHILS ABSOLUTE: 0 K/UL (ref 0–0.2)
BASOPHILS RELATIVE PERCENT: 0.7 %
BILIRUB SERPL-MCNC: 0.8 MG/DL (ref 0–1)
BUN BLDV-MCNC: 12 MG/DL (ref 7–20)
CALCIUM SERPL-MCNC: 9.3 MG/DL (ref 8.3–10.6)
CHLORIDE BLD-SCNC: 98 MMOL/L (ref 99–110)
CO2: 24 MMOL/L (ref 21–32)
CREAT SERPL-MCNC: 1 MG/DL (ref 0.6–1.2)
EOSINOPHILS ABSOLUTE: 0.1 K/UL (ref 0–0.6)
EOSINOPHILS RELATIVE PERCENT: 1 %
GFR AFRICAN AMERICAN: >60
GFR NON-AFRICAN AMERICAN: 54
GLOBULIN: 2.5 G/DL
GLUCOSE BLD-MCNC: 116 MG/DL (ref 70–99)
HCT VFR BLD CALC: 39.5 % (ref 36–48)
HEMOGLOBIN: 13.5 G/DL (ref 12–16)
LYMPHOCYTES ABSOLUTE: 0.8 K/UL (ref 1–5.1)
LYMPHOCYTES RELATIVE PERCENT: 11.5 %
MAGNESIUM: 1.9 MG/DL (ref 1.8–2.4)
MCH RBC QN AUTO: 32.1 PG (ref 26–34)
MCHC RBC AUTO-ENTMCNC: 34.2 G/DL (ref 31–36)
MCV RBC AUTO: 94 FL (ref 80–100)
MONOCYTES ABSOLUTE: 0.4 K/UL (ref 0–1.3)
MONOCYTES RELATIVE PERCENT: 5.2 %
NEUTROPHILS ABSOLUTE: 5.6 K/UL (ref 1.7–7.7)
NEUTROPHILS RELATIVE PERCENT: 81.6 %
PDW BLD-RTO: 14 % (ref 12.4–15.4)
PLATELET # BLD: 119 K/UL (ref 135–450)
PMV BLD AUTO: 8.8 FL (ref 5–10.5)
POTASSIUM REFLEX MAGNESIUM: 3.3 MMOL/L (ref 3.5–5.1)
RBC # BLD: 4.21 M/UL (ref 4–5.2)
SODIUM BLD-SCNC: 138 MMOL/L (ref 136–145)
TOTAL PROTEIN: 6.9 G/DL (ref 6.4–8.2)
WBC # BLD: 6.9 K/UL (ref 4–11)

## 2020-06-29 PROCEDURE — 1200000000 HC SEMI PRIVATE

## 2020-06-29 PROCEDURE — 73590 X-RAY EXAM OF LOWER LEG: CPT

## 2020-06-29 PROCEDURE — 93005 ELECTROCARDIOGRAM TRACING: CPT | Performed by: EMERGENCY MEDICINE

## 2020-06-29 PROCEDURE — 85025 COMPLETE CBC W/AUTO DIFF WBC: CPT

## 2020-06-29 PROCEDURE — 80053 COMPREHEN METABOLIC PANEL: CPT

## 2020-06-29 PROCEDURE — 6370000000 HC RX 637 (ALT 250 FOR IP): Performed by: EMERGENCY MEDICINE

## 2020-06-29 PROCEDURE — 70450 CT HEAD/BRAIN W/O DYE: CPT

## 2020-06-29 PROCEDURE — 73502 X-RAY EXAM HIP UNI 2-3 VIEWS: CPT

## 2020-06-29 PROCEDURE — 83735 ASSAY OF MAGNESIUM: CPT

## 2020-06-29 PROCEDURE — 73700 CT LOWER EXTREMITY W/O DYE: CPT

## 2020-06-29 PROCEDURE — 36415 COLL VENOUS BLD VENIPUNCTURE: CPT

## 2020-06-29 PROCEDURE — 99285 EMERGENCY DEPT VISIT HI MDM: CPT

## 2020-06-29 RX ORDER — POTASSIUM CHLORIDE 7.45 MG/ML
10 INJECTION INTRAVENOUS PRN
Status: DISCONTINUED | OUTPATIENT
Start: 2020-06-29 | End: 2020-07-06 | Stop reason: HOSPADM

## 2020-06-29 RX ORDER — OLMESARTAN MEDOXOMIL 40 MG/1
40 TABLET ORAL DAILY
Status: DISCONTINUED | OUTPATIENT
Start: 2020-06-30 | End: 2020-06-29 | Stop reason: CLARIF

## 2020-06-29 RX ORDER — ONDANSETRON 4 MG/1
4 TABLET, ORALLY DISINTEGRATING ORAL EVERY 8 HOURS PRN
Status: DISCONTINUED | OUTPATIENT
Start: 2020-06-29 | End: 2020-07-06 | Stop reason: HOSPADM

## 2020-06-29 RX ORDER — ACETAMINOPHEN 500 MG
500 TABLET ORAL ONCE
Status: COMPLETED | OUTPATIENT
Start: 2020-06-29 | End: 2020-06-29

## 2020-06-29 RX ORDER — LOSARTAN POTASSIUM 100 MG/1
100 TABLET ORAL DAILY
Status: DISCONTINUED | OUTPATIENT
Start: 2020-06-30 | End: 2020-07-02

## 2020-06-29 RX ORDER — PANTOPRAZOLE SODIUM 40 MG/1
40 TABLET, DELAYED RELEASE ORAL
Status: DISCONTINUED | OUTPATIENT
Start: 2020-06-30 | End: 2020-07-06 | Stop reason: HOSPADM

## 2020-06-29 RX ORDER — FLECAINIDE ACETATE 150 MG/1
75 TABLET ORAL 2 TIMES DAILY
Status: DISCONTINUED | OUTPATIENT
Start: 2020-06-29 | End: 2020-07-01

## 2020-06-29 RX ORDER — POLYETHYLENE GLYCOL 3350 17 G/17G
17 POWDER, FOR SOLUTION ORAL DAILY PRN
Status: DISCONTINUED | OUTPATIENT
Start: 2020-06-29 | End: 2020-07-06 | Stop reason: HOSPADM

## 2020-06-29 RX ORDER — SODIUM CHLORIDE 0.9 % (FLUSH) 0.9 %
10 SYRINGE (ML) INJECTION EVERY 12 HOURS SCHEDULED
Status: DISCONTINUED | OUTPATIENT
Start: 2020-06-29 | End: 2020-07-06 | Stop reason: HOSPADM

## 2020-06-29 RX ORDER — PROMETHAZINE HYDROCHLORIDE 25 MG/1
12.5 TABLET ORAL EVERY 6 HOURS PRN
Status: DISCONTINUED | OUTPATIENT
Start: 2020-06-29 | End: 2020-06-29

## 2020-06-29 RX ORDER — ACETAMINOPHEN 650 MG/1
650 SUPPOSITORY RECTAL EVERY 6 HOURS PRN
Status: DISCONTINUED | OUTPATIENT
Start: 2020-06-29 | End: 2020-07-06 | Stop reason: HOSPADM

## 2020-06-29 RX ORDER — ACETAMINOPHEN 325 MG/1
650 TABLET ORAL EVERY 6 HOURS PRN
Status: DISCONTINUED | OUTPATIENT
Start: 2020-06-29 | End: 2020-07-06 | Stop reason: HOSPADM

## 2020-06-29 RX ORDER — AMITRIPTYLINE HYDROCHLORIDE 10 MG/1
10 TABLET, FILM COATED ORAL NIGHTLY
Status: ON HOLD | COMMUNITY
End: 2020-07-06 | Stop reason: HOSPADM

## 2020-06-29 RX ORDER — METOPROLOL SUCCINATE 25 MG/1
25 TABLET, EXTENDED RELEASE ORAL DAILY
Status: DISCONTINUED | OUTPATIENT
Start: 2020-06-30 | End: 2020-07-06 | Stop reason: HOSPADM

## 2020-06-29 RX ORDER — TRAMADOL HYDROCHLORIDE 50 MG/1
50 TABLET ORAL EVERY 6 HOURS PRN
Status: DISCONTINUED | OUTPATIENT
Start: 2020-06-29 | End: 2020-07-01

## 2020-06-29 RX ORDER — TIMOLOL MALEATE 5 MG/ML
1 SOLUTION/ DROPS OPHTHALMIC 2 TIMES DAILY
Status: DISCONTINUED | OUTPATIENT
Start: 2020-06-29 | End: 2020-07-06 | Stop reason: HOSPADM

## 2020-06-29 RX ORDER — SODIUM CHLORIDE 0.9 % (FLUSH) 0.9 %
10 SYRINGE (ML) INJECTION PRN
Status: DISCONTINUED | OUTPATIENT
Start: 2020-06-29 | End: 2020-07-06 | Stop reason: HOSPADM

## 2020-06-29 RX ORDER — FLECAINIDE ACETATE 50 MG/1
50 TABLET ORAL 2 TIMES DAILY
COMMUNITY

## 2020-06-29 RX ORDER — ONDANSETRON 2 MG/ML
4 INJECTION INTRAMUSCULAR; INTRAVENOUS EVERY 6 HOURS PRN
Status: DISCONTINUED | OUTPATIENT
Start: 2020-06-29 | End: 2020-07-06 | Stop reason: HOSPADM

## 2020-06-29 RX ADMIN — ACETAMINOPHEN 500 MG: 500 TABLET ORAL at 18:47

## 2020-06-29 ASSESSMENT — PAIN DESCRIPTION - PAIN TYPE: TYPE: ACUTE PAIN

## 2020-06-29 ASSESSMENT — PAIN SCALES - GENERAL: PAINLEVEL_OUTOF10: 7

## 2020-06-29 ASSESSMENT — PAIN DESCRIPTION - LOCATION: LOCATION: HEAD

## 2020-06-29 NOTE — ED PROVIDER NOTES
CHIEF COMPLAINT  Fall (9 total falls since January 3, fell today hit head c/o right hip pain, back, and right elbow pain)      HISTORY OF PRESENT ILLNESS  Richi Harding is a 66 y.o. female presents to the ED with right hip pain and headache after a fall. The patient lost her balance today fell hitting her head and her right hip. She has pain in her right hip, right leg, and right forehead. She did not lose consciousness, has had no nausea or vomiting. She was unable to get up off the ground and came in via ambulance. No other complaints, modifying factors or associated symptoms. I have reviewed the following from the nursing documentation.     Past Medical History:   Diagnosis Date    Abnormal LFT's     Arthritis     Atrial fibrillation (HCC)     Follicular lymphoma (HCC)     Dr Shonna Holbrook GERD (gastroesophageal reflux disease)     Glaucoma     Gout 11/2/2010    Hypertension     Liver disease     steatohepatitis    Migraine     Non-ulcer dyspepsia     egd neg 11/2011    Peripheral vascular disease ([facility] [address]     Thyroid disease      Past Surgical History:   Procedure Laterality Date    APPENDECTOMY      CATARACT REMOVAL      2007    CHOLECYSTECTOMY      2005    COLONOSCOPY  2/27/2015    polyp    ENDOSCOPY, COLON, DIAGNOSTIC  11/14/2011    bx stomach polyp    HYSTERECTOMY      LYMPH NODE BIOPSY      THYROIDECTOMY      76    TONSILLECTOMY      UPPER GASTROINTESTINAL ENDOSCOPY      UPPER GASTROINTESTINAL ENDOSCOPY  2/27/2015    gastric polyp     Family History   Problem Relation Age of Onset    Heart Disease Mother     Cancer Sister     Cancer Brother      Social History     Socioeconomic History    Marital status:      Spouse name: Not on file    Number of children: Not on file    Years of education: Not on file    Highest education level: Not on file   Occupational History    Not on file   Social Needs    Financial resource strain: Not on file    Food insecurity     Worry: ophthalmic gel-forming Place 1 drop into both eyes 2 times daily.          Allergies   Allergen Reactions    Latex Hives    Ferric Carboxymaltose Anaphylaxis    Aldactone [Spironolactone]     Baclofen Other (See Comments)     Dizziness, hallucinations    Cardizem [Diltiazem Hcl]      Increases liver enzymes    Dilaudid [Hydromorphone Hcl] Other (See Comments)     Vomitting, and dizziness    Droperidol     Guanfacine Hcl Itching     Night terrors,insomnia,extreme dizziness    Hydrochlorothiazide      Causes gout    Ibuprofen Hives    Lasix [Furosemide]     Lorazepam     Nexium [Esomeprazole Magnesium Trihydrate] Other (See Comments)     Oral sores    Nortriptyline Other (See Comments)     Severe night terrors    Other      Flu shot    Phenergan [Promethazine Hcl]     Phenergan [Promethazine Hcl]      Tardive dyskinesia    Phenothiazines      Causes parkinson sx    Pilocarpine     Prochlorperazine Edisylate     Reglan [Metoclopramide Hcl]     Rofecoxib     Triavil [Perphenazine-Amitriptyline]      Parkinson sxs    Ultracet [Tramadol-Acetaminophen] Other (See Comments)     dizziness    Valium     Vioxx Other (See Comments)     Facial and lip swelling    Zofran Odt [Ondansetron]      Zofran PO is ok    Amlodipine Itching, Nausea And Vomiting and Swelling    Amoxicillin Rash    Augmentin [Amoxicillin-Pot Clavulanate] Itching and Rash    Avelox [Moxifloxacin] Rash    Bactrim [Sulfamethoxazole-Trimethoprim] Nausea And Vomiting     And thrush/yeast unfection    Biaxin [Clarithromycin] Itching and Rash    Cephalexin Rash    Clonidine Derivatives Anxiety    Codeine Nausea And Vomiting and Rash    Coreg [Carvedilol] Rash    Demerol Nausea And Vomiting    Hydralazine Itching, Swelling and Rash    Levaquin [Levofloxacin In D5w] Nausea And Vomiting    Maxalt [Rizatriptan Benzoate] Palpitations    Morphine And Related Rash    Scopolamine Nausea And Vomiting     severe    Zonisamide Potassium reflex Magnesium 3.3 (L) 3.5 - 5.1 mmol/L    Chloride 98 (L) 99 - 110 mmol/L    CO2 24 21 - 32 mmol/L    Anion Gap 16 3 - 16    Glucose 116 (H) 70 - 99 mg/dL    BUN 12 7 - 20 mg/dL    CREATININE 1.0 0.6 - 1.2 mg/dL    GFR Non-African American 54 (A) >60    GFR African American >60 >60    Calcium 9.3 8.3 - 10.6 mg/dL    Total Protein 6.9 6.4 - 8.2 g/dL    Alb 4.4 3.4 - 5.0 g/dL    Albumin/Globulin Ratio 1.8 1.1 - 2.2    Total Bilirubin 0.8 0.0 - 1.0 mg/dL    Alkaline Phosphatase 161 (H) 40 - 129 U/L    ALT 14 10 - 40 U/L    AST 26 15 - 37 U/L    Globulin 2.5 g/dL   Magnesium   Result Value Ref Range    Magnesium 1.90 1.80 - 2.40 mg/dL   EKG 12 Lead   Result Value Ref Range    Ventricular Rate 74 BPM    Atrial Rate 74 BPM    P-R Interval 204 ms    QRS Duration 90 ms    Q-T Interval 418 ms    QTc Calculation (Bazett) 463 ms    P Axis 70 degrees    R Axis 13 degrees    T Axis 91 degrees    Diagnosis       Normal sinus rhythmST & T wave abnormality, consider lateral ischemiaAbnormal ECGNo previous ECGs available               RADIOLOGY  Xr Tibia Fibula Right (2 Views)    Result Date: 6/29/2020  EXAMINATION: 3 XRAY VIEWS OF THE RIGHT TIBIA AND FIBULA 6/29/2020 7:15 pm COMPARISON: The knee radiographs 12/17/2018 HISTORY: ORDERING SYSTEM PROVIDED HISTORY: injury TECHNOLOGIST PROVIDED HISTORY: Reason for exam:->injury Reason for Exam: Pt fell today, c/o pain Acuity: Acute Type of Exam: Initial FINDINGS: The bones appear demineralized and show mild degenerative changes. There is healed deformity of the proximal right tibia as well as the mid to distal shafts of the right tibia and fibula compatible with prior remote healed fractures. No acute fracture is seen. No acute bony abnormality.      Ct Head Wo Contrast    Result Date: 6/29/2020  EXAMINATION: CT OF THE HEAD WITHOUT CONTRAST  6/29/2020 7:20 pm TECHNIQUE: CT of the head was performed without the administration of intravenous contrast. Dose modulation, iterative reconstruction, and/or weight based adjustment of the mA/kV was utilized to reduce the radiation dose to as low as reasonably achievable. COMPARISON: 12/17/2018. HISTORY: ORDERING SYSTEM PROVIDED HISTORY: head injury TECHNOLOGIST PROVIDED HISTORY: Reason for exam:->head injury Has a \"code stroke\" or \"stroke alert\" been called? ->No Reason for Exam: Pt fell today, c/o pain Acuity: Acute Type of Exam: Initial FINDINGS: BRAIN/VENTRICLES: There is no acute intracranial hemorrhage, mass effect or midline shift. No abnormal extra-axial fluid collection. Cortical atrophy and chronic white matter changes in the brain and associated ventricular enlargement are again demonstrated. ORBITS: The visualized portion of the orbits demonstrate no acute abnormality. SINUSES: Chronic opacification of the left sphenoid sinus with calcification. SOFT TISSUES/SKULL:  Right anterolateral scalp injury. No underlying fracture. Right frontal scalp injury without underlying fracture or acute intracranial CT abnormality. Ct Hip Right Wo Contrast    Result Date: 6/29/2020  EXAMINATION: CT OF THE RIGHT HIP WITHOUT CONTRAST 6/29/2020 7:27 pm TECHNIQUE: CT of the right hip was performed without the administration of intravenous contrast.  Multiplanar reformatted images are provided for review. Dose modulation, iterative reconstruction, and/or weight based adjustment of the mA/kV was utilized to reduce the radiation dose to as low as reasonably achievable. COMPARISON: Right hip radiograph 06/29/2020. HISTORY ORDERING SYSTEM PROVIDED HISTORY: injury TECHNOLOGIST PROVIDED HISTORY: Reason for exam:->injury Reason for Exam: Pt fell today, c/o pain, xrays done prior Acuity: Acute Type of Exam: Initial FINDINGS: INTRAPELVIC CONTENTS: Abdominal aortic and iliac atherosclerosis with no aneurysm formation. The visualized small bowel loops appear unremarkable. Sigmoid colonic diverticulosis with no acute abnormality.  Central mesenteric amorphous soft tissue induration. There are multiple central mesenteric enlarged lymph nodes and macrolobular masses. Multiple enlarged retroperitoneal periaortic lymph nodes. Right common iliac chain enlarged lymph nodes. No free intraperitoneal air. No ascites. The bladder is normal.  Prior hysterectomy. Multiple pelvic phleboliths. BODY WALL/MUSCULATURE:  No significant enlarged inguinal lymph nodes. Symmetric global pelvic muscular atrophy. Mild right hip effusion and right hip periarticular edema. No focal intramuscular hematoma. OSSEOUS STRUCTURES: Decreased bone mineral density. Lower lumbar degenerative joint disease. Normal bilateral sacroiliac alignment. Normal sacrococcygeal curvature and alignment. Normal pubic symphysis alignment with chondrocalcinosis present. There is an acute impacted and mildly rotated right femoral neck fracture. Normal hip alignment with mild osteoarthritis. The left hip demonstrates normal alignment with no acute fracture. Mild osteoarthritis. No intraosseous suspicious sclerotic or lytic lesion. 1. Acute mildly impacted and rotated right femoral neck fracture. Normal right hip alignment. 2. Abdominal and pelvic lymphadenopathy consistent with history of lymphoma. Images of a prior 2017 PET/CT could not be obtained at this time for comparison. If they can be loaded into the system a comparison can be made for interval change. Prior to signing the report to the 08/15/2017 PET/CT became available for comparison. There is progressive lymphadenopathy consistent with progressive lymphoma.      Xr Hip 2-3 Vw W Pelvis Right    Result Date: 6/29/2020  EXAMINATION: ONE XRAY VIEW OF THE PELVIS AND TWO XRAY VIEWS RIGHT HIP 6/29/2020 7:15 pm COMPARISON: 01/30/2020 HISTORY: ORDERING SYSTEM PROVIDED HISTORY: injury TECHNOLOGIST PROVIDED HISTORY: Reason for exam:->injury Reason for Exam: Pt fell today, c/o pain Acuity: Acute Type of Exam: Initial FINDINGS: There is a subcapital right femoral neck fracture. Mild degenerative changes the bones are evident. Subcapital right femoral neck fracture. ED COURSE/MDM  Patient seen and evaluated. Old records reviewed. Labs and imaging reviewed and results discussed with patient. Patient came in after a fall was hypertensive, and hit her head and had pain in her right hip, did not have a subdural epidural hematoma was found to have a hip fracture, she was given pain medication, I did talk with orthopedics as well as hospital medicine. She was admitted. CLINICAL IMPRESSION  1.  Closed right hip fracture, initial encounter [facility])                      Dima Dover MD  06/29/20 [id number]

## 2020-06-29 NOTE — ED NOTES
Bed: 17  Expected date:   Expected time:   Means of arrival:   Comments:  Hallie Lancaster  06/29/20 1829

## 2020-06-30 ENCOUNTER — ANESTHESIA EVENT (OUTPATIENT)
Dept: OPERATING ROOM | Age: 75
DRG: 481 | End: 2020-06-30
Payer: MEDICARE

## 2020-06-30 ENCOUNTER — APPOINTMENT (OUTPATIENT)
Dept: GENERAL RADIOLOGY | Age: 75
DRG: 481 | End: 2020-06-30
Payer: MEDICARE

## 2020-06-30 ENCOUNTER — ANESTHESIA (OUTPATIENT)
Dept: OPERATING ROOM | Age: 75
DRG: 481 | End: 2020-06-30
Payer: MEDICARE

## 2020-06-30 VITALS
SYSTOLIC BLOOD PRESSURE: 171 MMHG | RESPIRATION RATE: 17 BRPM | OXYGEN SATURATION: 96 % | DIASTOLIC BLOOD PRESSURE: 130 MMHG

## 2020-06-30 LAB
ANION GAP SERPL CALCULATED.3IONS-SCNC: 18 MMOL/L (ref 3–16)
BASOPHILS ABSOLUTE: 0 K/UL (ref 0–0.2)
BASOPHILS RELATIVE PERCENT: 0.7 %
BUN BLDV-MCNC: 11 MG/DL (ref 7–20)
CALCIUM SERPL-MCNC: 9.1 MG/DL (ref 8.3–10.6)
CHLORIDE BLD-SCNC: 96 MMOL/L (ref 99–110)
CO2: 24 MMOL/L (ref 21–32)
CREAT SERPL-MCNC: 0.9 MG/DL (ref 0.6–1.2)
EKG ATRIAL RATE: 74 BPM
EKG ATRIAL RATE: 79 BPM
EKG DIAGNOSIS: NORMAL
EKG DIAGNOSIS: NORMAL
EKG P AXIS: 46 DEGREES
EKG P AXIS: 70 DEGREES
EKG P-R INTERVAL: 182 MS
EKG P-R INTERVAL: 204 MS
EKG Q-T INTERVAL: 408 MS
EKG Q-T INTERVAL: 418 MS
EKG QRS DURATION: 90 MS
EKG QRS DURATION: 94 MS
EKG QTC CALCULATION (BAZETT): 463 MS
EKG QTC CALCULATION (BAZETT): 467 MS
EKG R AXIS: 13 DEGREES
EKG R AXIS: 8 DEGREES
EKG T AXIS: 136 DEGREES
EKG T AXIS: 91 DEGREES
EKG VENTRICULAR RATE: 74 BPM
EKG VENTRICULAR RATE: 79 BPM
EOSINOPHILS ABSOLUTE: 0 K/UL (ref 0–0.6)
EOSINOPHILS RELATIVE PERCENT: 0.5 %
GFR AFRICAN AMERICAN: >60
GFR NON-AFRICAN AMERICAN: >60
GLUCOSE BLD-MCNC: 139 MG/DL (ref 70–99)
HCT VFR BLD CALC: 38.9 % (ref 36–48)
HEMOGLOBIN: 13.2 G/DL (ref 12–16)
LACTIC ACID: 0.6 MMOL/L (ref 0.4–2)
LYMPHOCYTES ABSOLUTE: 0.6 K/UL (ref 1–5.1)
LYMPHOCYTES RELATIVE PERCENT: 8.5 %
MAGNESIUM: 1.8 MG/DL (ref 1.8–2.4)
MAGNESIUM: 1.8 MG/DL (ref 1.8–2.4)
MCH RBC QN AUTO: 31.9 PG (ref 26–34)
MCHC RBC AUTO-ENTMCNC: 34.1 G/DL (ref 31–36)
MCV RBC AUTO: 93.7 FL (ref 80–100)
MONOCYTES ABSOLUTE: 0.4 K/UL (ref 0–1.3)
MONOCYTES RELATIVE PERCENT: 5.2 %
NEUTROPHILS ABSOLUTE: 5.8 K/UL (ref 1.7–7.7)
NEUTROPHILS RELATIVE PERCENT: 85.1 %
PDW BLD-RTO: 13.8 % (ref 12.4–15.4)
PLATELET # BLD: 106 K/UL (ref 135–450)
PMV BLD AUTO: 9.5 FL (ref 5–10.5)
POTASSIUM REFLEX MAGNESIUM: 3.5 MMOL/L (ref 3.5–5.1)
RBC # BLD: 4.15 M/UL (ref 4–5.2)
SARS-COV-2, NAAT: NOT DETECTED
SODIUM BLD-SCNC: 138 MMOL/L (ref 136–145)
TROPONIN: <0.01 NG/ML
TSH REFLEX: 0.73 UIU/ML (ref 0.27–4.2)
WBC # BLD: 6.8 K/UL (ref 4–11)

## 2020-06-30 PROCEDURE — 3600000014 HC SURGERY LEVEL 4 ADDTL 15MIN: Performed by: ORTHOPAEDIC SURGERY

## 2020-06-30 PROCEDURE — 6360000002 HC RX W HCPCS: Performed by: ANESTHESIOLOGY

## 2020-06-30 PROCEDURE — 7100000000 HC PACU RECOVERY - FIRST 15 MIN: Performed by: ORTHOPAEDIC SURGERY

## 2020-06-30 PROCEDURE — 3209999900 FLUORO FOR SURGICAL PROCEDURES

## 2020-06-30 PROCEDURE — U0002 COVID-19 LAB TEST NON-CDC: HCPCS

## 2020-06-30 PROCEDURE — 85025 COMPLETE CBC W/AUTO DIFF WBC: CPT

## 2020-06-30 PROCEDURE — 36415 COLL VENOUS BLD VENIPUNCTURE: CPT

## 2020-06-30 PROCEDURE — 99232 SBSQ HOSP IP/OBS MODERATE 35: CPT | Performed by: INTERNAL MEDICINE

## 2020-06-30 PROCEDURE — 6370000000 HC RX 637 (ALT 250 FOR IP): Performed by: HOSPITALIST

## 2020-06-30 PROCEDURE — 80048 BASIC METABOLIC PNL TOTAL CA: CPT

## 2020-06-30 PROCEDURE — 6360000002 HC RX W HCPCS: Performed by: HOSPITALIST

## 2020-06-30 PROCEDURE — 2720000010 HC SURG SUPPLY STERILE: Performed by: ORTHOPAEDIC SURGERY

## 2020-06-30 PROCEDURE — 93010 ELECTROCARDIOGRAM REPORT: CPT | Performed by: INTERNAL MEDICINE

## 2020-06-30 PROCEDURE — 83605 ASSAY OF LACTIC ACID: CPT

## 2020-06-30 PROCEDURE — 2580000003 HC RX 258: Performed by: ORTHOPAEDIC SURGERY

## 2020-06-30 PROCEDURE — 6360000002 HC RX W HCPCS: Performed by: ORTHOPAEDIC SURGERY

## 2020-06-30 PROCEDURE — 93005 ELECTROCARDIOGRAM TRACING: CPT | Performed by: HOSPITALIST

## 2020-06-30 PROCEDURE — 7100000001 HC PACU RECOVERY - ADDTL 15 MIN: Performed by: ORTHOPAEDIC SURGERY

## 2020-06-30 PROCEDURE — 1200000000 HC SEMI PRIVATE

## 2020-06-30 PROCEDURE — 84484 ASSAY OF TROPONIN QUANT: CPT

## 2020-06-30 PROCEDURE — 76942 ECHO GUIDE FOR BIOPSY: CPT | Performed by: ANESTHESIOLOGY

## 2020-06-30 PROCEDURE — 83735 ASSAY OF MAGNESIUM: CPT

## 2020-06-30 PROCEDURE — 2500000003 HC RX 250 WO HCPCS: Performed by: ANESTHESIOLOGY

## 2020-06-30 PROCEDURE — 0QH634Z INSERTION OF INTERNAL FIXATION DEVICE INTO RIGHT UPPER FEMUR, PERCUTANEOUS APPROACH: ICD-10-PCS | Performed by: ORTHOPAEDIC SURGERY

## 2020-06-30 PROCEDURE — C1713 ANCHOR/SCREW BN/BN,TIS/BN: HCPCS | Performed by: ORTHOPAEDIC SURGERY

## 2020-06-30 PROCEDURE — 2709999900 HC NON-CHARGEABLE SUPPLY: Performed by: ORTHOPAEDIC SURGERY

## 2020-06-30 PROCEDURE — 3600000004 HC SURGERY LEVEL 4 BASE: Performed by: ORTHOPAEDIC SURGERY

## 2020-06-30 PROCEDURE — 2580000003 HC RX 258

## 2020-06-30 PROCEDURE — 2580000003 HC RX 258: Performed by: HOSPITALIST

## 2020-06-30 PROCEDURE — 2580000003 HC RX 258: Performed by: ANESTHESIOLOGY

## 2020-06-30 PROCEDURE — 6370000000 HC RX 637 (ALT 250 FOR IP): Performed by: INTERNAL MEDICINE

## 2020-06-30 PROCEDURE — 3700000001 HC ADD 15 MINUTES (ANESTHESIA): Performed by: ORTHOPAEDIC SURGERY

## 2020-06-30 PROCEDURE — 84443 ASSAY THYROID STIM HORMONE: CPT

## 2020-06-30 PROCEDURE — 6370000000 HC RX 637 (ALT 250 FOR IP): Performed by: ORTHOPAEDIC SURGERY

## 2020-06-30 PROCEDURE — 3700000000 HC ANESTHESIA ATTENDED CARE: Performed by: ORTHOPAEDIC SURGERY

## 2020-06-30 DEVICE — CANNULATED SCREW
Type: IMPLANTABLE DEVICE | Site: HIP | Status: FUNCTIONAL
Brand: ASNIS

## 2020-06-30 RX ORDER — BUPIVACAINE HYDROCHLORIDE 2.5 MG/ML
INJECTION, SOLUTION EPIDURAL; INFILTRATION; INTRACAUDAL PRN
Status: DISCONTINUED | OUTPATIENT
Start: 2020-06-30 | End: 2020-06-30 | Stop reason: SDUPTHER

## 2020-06-30 RX ORDER — SENNA AND DOCUSATE SODIUM 50; 8.6 MG/1; MG/1
1 TABLET, FILM COATED ORAL 2 TIMES DAILY
Status: DISCONTINUED | OUTPATIENT
Start: 2020-06-30 | End: 2020-07-06 | Stop reason: HOSPADM

## 2020-06-30 RX ORDER — PROPOFOL 10 MG/ML
INJECTION, EMULSION INTRAVENOUS PRN
Status: DISCONTINUED | OUTPATIENT
Start: 2020-06-30 | End: 2020-06-30 | Stop reason: SDUPTHER

## 2020-06-30 RX ORDER — SODIUM CHLORIDE, SODIUM LACTATE, POTASSIUM CHLORIDE, CALCIUM CHLORIDE 600; 310; 30; 20 MG/100ML; MG/100ML; MG/100ML; MG/100ML
INJECTION, SOLUTION INTRAVENOUS
Status: COMPLETED
Start: 2020-06-30 | End: 2020-06-30

## 2020-06-30 RX ORDER — SODIUM CHLORIDE, SODIUM LACTATE, POTASSIUM CHLORIDE, CALCIUM CHLORIDE 600; 310; 30; 20 MG/100ML; MG/100ML; MG/100ML; MG/100ML
INJECTION, SOLUTION INTRAVENOUS CONTINUOUS
Status: DISCONTINUED | OUTPATIENT
Start: 2020-06-30 | End: 2020-07-02

## 2020-06-30 RX ORDER — ROCURONIUM BROMIDE 10 MG/ML
INJECTION, SOLUTION INTRAVENOUS PRN
Status: DISCONTINUED | OUTPATIENT
Start: 2020-06-30 | End: 2020-06-30 | Stop reason: SDUPTHER

## 2020-06-30 RX ORDER — FENTANYL CITRATE 50 UG/ML
25 INJECTION, SOLUTION INTRAMUSCULAR; INTRAVENOUS EVERY 5 MIN PRN
Status: DISCONTINUED | OUTPATIENT
Start: 2020-06-30 | End: 2020-06-30 | Stop reason: HOSPADM

## 2020-06-30 RX ORDER — MAGNESIUM HYDROXIDE 1200 MG/15ML
LIQUID ORAL CONTINUOUS PRN
Status: COMPLETED | OUTPATIENT
Start: 2020-06-30 | End: 2020-06-30

## 2020-06-30 RX ORDER — SODIUM CHLORIDE 0.9 % (FLUSH) 0.9 %
10 SYRINGE (ML) INJECTION PRN
Status: DISCONTINUED | OUTPATIENT
Start: 2020-06-30 | End: 2020-06-30 | Stop reason: SDUPTHER

## 2020-06-30 RX ORDER — CLINDAMYCIN PHOSPHATE 600 MG/50ML
600 INJECTION INTRAVENOUS
Status: DISCONTINUED | OUTPATIENT
Start: 2020-06-30 | End: 2020-06-30

## 2020-06-30 RX ORDER — DOXAZOSIN 2 MG/1
4 TABLET ORAL DAILY
Status: DISCONTINUED | OUTPATIENT
Start: 2020-06-30 | End: 2020-07-02

## 2020-06-30 RX ORDER — SODIUM CHLORIDE 0.9 % (FLUSH) 0.9 %
10 SYRINGE (ML) INJECTION EVERY 12 HOURS SCHEDULED
Status: DISCONTINUED | OUTPATIENT
Start: 2020-06-30 | End: 2020-06-30 | Stop reason: SDUPTHER

## 2020-06-30 RX ADMIN — ROCURONIUM BROMIDE 30 MG: 10 INJECTION, SOLUTION INTRAVENOUS at 19:13

## 2020-06-30 RX ADMIN — TRAMADOL HYDROCHLORIDE 50 MG: 50 TABLET, FILM COATED ORAL at 09:04

## 2020-06-30 RX ADMIN — SODIUM CHLORIDE, POTASSIUM CHLORIDE, SODIUM LACTATE AND CALCIUM CHLORIDE: 600; 310; 30; 20 INJECTION, SOLUTION INTRAVENOUS at 19:08

## 2020-06-30 RX ADMIN — TIMOLOL MALEATE 1 DROP: 5 SOLUTION/ DROPS OPHTHALMIC at 21:34

## 2020-06-30 RX ADMIN — SODIUM CHLORIDE, POTASSIUM CHLORIDE, SODIUM LACTATE AND CALCIUM CHLORIDE: 600; 310; 30; 20 INJECTION, SOLUTION INTRAVENOUS at 23:47

## 2020-06-30 RX ADMIN — Medication 10 ML: at 00:47

## 2020-06-30 RX ADMIN — NITROGLYCERIN 0.5 INCH: 20 OINTMENT TOPICAL at 15:25

## 2020-06-30 RX ADMIN — PHENYLEPHRINE HYDROCHLORIDE 100 MCG: 10 INJECTION INTRAVENOUS at 19:29

## 2020-06-30 RX ADMIN — SODIUM CHLORIDE, POTASSIUM CHLORIDE, SODIUM LACTATE AND CALCIUM CHLORIDE 1000 ML: 600; 310; 30; 20 INJECTION, SOLUTION INTRAVENOUS at 17:32

## 2020-06-30 RX ADMIN — Medication 10 ML: at 23:47

## 2020-06-30 RX ADMIN — LOSARTAN POTASSIUM 100 MG: 100 TABLET, FILM COATED ORAL at 09:00

## 2020-06-30 RX ADMIN — BUPIVACAINE HYDROCHLORIDE 30 ML: 2.5 INJECTION, SOLUTION EPIDURAL; INFILTRATION; INTRACAUDAL; PERINEURAL at 18:54

## 2020-06-30 RX ADMIN — TIMOLOL MALEATE 1 DROP: 5 SOLUTION/ DROPS OPHTHALMIC at 09:07

## 2020-06-30 RX ADMIN — TRAMADOL HYDROCHLORIDE 50 MG: 50 TABLET, FILM COATED ORAL at 02:05

## 2020-06-30 RX ADMIN — NITROGLYCERIN 0.5 INCH: 20 OINTMENT TOPICAL at 23:47

## 2020-06-30 RX ADMIN — SUGAMMADEX 100 MG: 100 INJECTION, SOLUTION INTRAVENOUS at 20:04

## 2020-06-30 RX ADMIN — PHENYLEPHRINE HYDROCHLORIDE 100 MCG: 10 INJECTION INTRAVENOUS at 19:54

## 2020-06-30 RX ADMIN — PROPOFOL 40 MG: 10 INJECTION, EMULSION INTRAVENOUS at 19:19

## 2020-06-30 RX ADMIN — DEXTROSE MONOHYDRATE 250 MG: 50 INJECTION, SOLUTION INTRAVENOUS at 19:15

## 2020-06-30 RX ADMIN — FENTANYL CITRATE 25 MCG: 50 INJECTION INTRAMUSCULAR; INTRAVENOUS at 20:39

## 2020-06-30 RX ADMIN — PANTOPRAZOLE SODIUM 40 MG: 40 TABLET, DELAYED RELEASE ORAL at 09:00

## 2020-06-30 RX ADMIN — PROPOFOL 100 MG: 10 INJECTION, EMULSION INTRAVENOUS at 19:13

## 2020-06-30 RX ADMIN — METOPROLOL SUCCINATE 25 MG: 25 TABLET, EXTENDED RELEASE ORAL at 09:00

## 2020-06-30 RX ADMIN — ONDANSETRON HYDROCHLORIDE 4 MG: 2 INJECTION, SOLUTION INTRAMUSCULAR; INTRAVENOUS at 10:19

## 2020-06-30 RX ADMIN — Medication 10 ML: at 09:04

## 2020-06-30 ASSESSMENT — PULMONARY FUNCTION TESTS
PIF_VALUE: 16
PIF_VALUE: 16
PIF_VALUE: 3
PIF_VALUE: 16
PIF_VALUE: 3
PIF_VALUE: 14
PIF_VALUE: 16
PIF_VALUE: 16
PIF_VALUE: 23
PIF_VALUE: 16
PIF_VALUE: 3
PIF_VALUE: 3
PIF_VALUE: 17
PIF_VALUE: 29
PIF_VALUE: 16
PIF_VALUE: 0
PIF_VALUE: 14
PIF_VALUE: 16
PIF_VALUE: 15
PIF_VALUE: 0
PIF_VALUE: 2
PIF_VALUE: 15
PIF_VALUE: 15
PIF_VALUE: 16
PIF_VALUE: 16
PIF_VALUE: 21
PIF_VALUE: 1
PIF_VALUE: 16
PIF_VALUE: 0
PIF_VALUE: 16
PIF_VALUE: 18
PIF_VALUE: 0
PIF_VALUE: 16
PIF_VALUE: 16
PIF_VALUE: 7
PIF_VALUE: 4
PIF_VALUE: 16
PIF_VALUE: 3
PIF_VALUE: 16
PIF_VALUE: 3
PIF_VALUE: 26
PIF_VALUE: 16
PIF_VALUE: 12
PIF_VALUE: 0
PIF_VALUE: 16
PIF_VALUE: 3
PIF_VALUE: 16
PIF_VALUE: 3
PIF_VALUE: 3
PIF_VALUE: 17
PIF_VALUE: 16
PIF_VALUE: 3
PIF_VALUE: 15
PIF_VALUE: 16
PIF_VALUE: 15
PIF_VALUE: 16

## 2020-06-30 ASSESSMENT — PAIN SCALES - GENERAL
PAINLEVEL_OUTOF10: 3
PAINLEVEL_OUTOF10: 0
PAINLEVEL_OUTOF10: 3
PAINLEVEL_OUTOF10: 7
PAINLEVEL_OUTOF10: 8
PAINLEVEL_OUTOF10: 7

## 2020-06-30 ASSESSMENT — PAIN DESCRIPTION - PAIN TYPE: TYPE: ACUTE PAIN

## 2020-06-30 ASSESSMENT — PAIN DESCRIPTION - LOCATION: LOCATION: ABDOMEN

## 2020-06-30 NOTE — PROGRESS NOTES
Handoff report and transfer of care given at bedside to [facility].. Patient in stable condition, denies needs/concerns at this time. Call light within reach.

## 2020-06-30 NOTE — CARE COORDINATION
Case Management Assessment  Initial Evaluation    Date/Time of Evaluation: 6/30/2020 2:46 PM  Assessment Completed by: Terri Garcia    Patient Name: Josey Finn  YOB: 1945  Diagnosis: Closed right hip fracture, initial encounter [facility]) Piper Cook  Date / Time: 6/29/2020  6:21 PM  Admission status/Date: 06/30/20 Chart Reviewed: Yes      Patient Interviewed: Yes   Family Interviewed:  Yes - sp      Hospitalization in the last 30 days:  No    Contacts  :     Relationship to Patient:   Phone Number:    Alternate Contact:     Relationship to Patient:     Phone Number:    Met with:    Current PCP [address] required for SNF : Y,      3 night stay required - , [employer]  Support Systems: Children, Spouse/Significant Other  Transportation: self    Meal Preparation: self    Housing  Home Environment: house  Steps: ? Plans to Return to Present Housing: No  Other Identified Issues: Love Erika  Currently active with [address] : No  Type of Home Care Services: [facility]  Passport/Waiver : No  :                      Phone Number:    Passport/Waiver Services: [address] Provider: n/a  Equipment: n/aWalker___Cane___RTS___ BSC___Shower Chair___  02__ at ____Liter(s)---Uses________HHN___ CPAP___ BiPap___ Hospital Bed___W/C____Other________      Has Home O2 in place on admit:  No      Community Service Affiliation  Dialysis:  No    · Name:  · Location  · Dialysis Schedule:  · Phone:   · Fax: Outpatient PT/OT: No    Cancer Center: No     CHF Clinic: No     Pulmonary Rehab: No  Pain Clinic: No  Community Mental Health: No    Wound Clinic: No     COVID SCREENING: No       Other: n/a    The Plan for Transition of Care is related to the following treatment goals: snf    The Patient and/or patient representative Pt was provided with a choice of provider and agrees   with the discharge plan.  [x] Yes []

## 2020-06-30 NOTE — CONSULTS
Inpatient Consultation    Kriss Zavaleta (1945)  6/30/2020 Date of consult    Reason for Consult: Right hip fracture  Requesting Physician: Delio Sargent MD    CHIEF COMPLAINT:  As above    History Obtained From:  patient, electronic medical record    HISTORY OF PRESENT ILLNESS:                The patient is a 66 y.o. female who presents with above chief complaint. 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 She lives with her  and uses a walker at baseline.     Past Medical History:        Diagnosis Date    Abnormal LFT's     Arthritis     Atrial fibrillation (HCC)     Follicular lymphoma (HCC)     Dr Liliane Ramsay GERD (gastroesophageal reflux disease)     Glaucoma     Gout 11/2/2010    Hypertension     Liver disease     steatohepatitis    Migraine     Non-ulcer dyspepsia     egd neg 11/2011    Peripheral vascular disease ([address]     Thyroid disease        Past Surgical History:        Procedure Laterality Date    APPENDECTOMY      CATARACT REMOVAL      2007    CHOLECYSTECTOMY      2005    COLONOSCOPY  2/27/2015    polyp    ENDOSCOPY, COLON, DIAGNOSTIC  11/14/2011    bx stomach polyp    HYSTERECTOMY      LYMPH NODE BIOPSY      THYROIDECTOMY      77    TONSILLECTOMY      UPPER GASTROINTESTINAL ENDOSCOPY      UPPER GASTROINTESTINAL ENDOSCOPY  2/27/2015    gastric polyp       Current Medications:   Current Facility-Administered Medications: doxazosin (CARDURA) tablet 4 mg, 4 mg, Oral, Daily  clindamycin (CLEOCIN) 600 mg in dextrose 5 % 50 mL IVPB, 600 mg, Intravenous, On Call to OR  traMADol (ULTRAM) tablet 50 mg, 50 mg, Oral, Q6H PRN  flecainide (TAMBOCOR) tablet 75 mg, 75 mg, Oral, BID  metoprolol succinate (TOPROL XL) extended release tablet 25 mg, 25 mg, Oral, Daily  pantoprazole (PROTONIX) tablet 40 mg, 40 mg, Oral, QAM AC  ondansetron (ZOFRAN-ODT) disintegrating tablet 4 mg, 4 mg, Oral, Q8H PRN  timolol (TIMOPTIC) 0.5 % ophthalmic solution 1 drop, 1 drop, Both Eyes, BID  sodium chloride flush 0.9 % injection 10 mL, 10 mL, Intravenous, 2 times per day  sodium chloride flush 0.9 % injection 10 mL, 10 mL, Intravenous, PRN  acetaminophen (TYLENOL) tablet 650 mg, 650 mg, Oral, Q6H PRN **OR** acetaminophen (TYLENOL) suppository 650 mg, 650 mg, Rectal, Q6H PRN  polyethylene glycol (GLYCOLAX) packet 17 g, 17 g, Oral, Daily PRN  [DISCONTINUED] promethazine (PHENERGAN) tablet 12.5 mg, 12.5 mg, Oral, Q6H PRN **OR** ondansetron (ZOFRAN) injection 4 mg, 4 mg, Intravenous, Q6H PRN  enoxaparin (LOVENOX) injection 40 mg, 40 mg, Subcutaneous, Daily  potassium chloride 10 mEq/100 mL IVPB (Peripheral Line), 10 mEq, Intravenous, PRN  losartan (COZAAR) tablet 100 mg, 100 mg, Oral, Daily    Allergies:  Latex; Ferric carboxymaltose; Aldactone [spironolactone]; Baclofen; Cardizem [diltiazem hcl]; Dilaudid [hydromorphone hcl]; Droperidol; Guanfacine hcl; Hydrochlorothiazide; Ibuprofen; Lasix [furosemide]; Lorazepam; Nexium [esomeprazole magnesium trihydrate]; Nortriptyline; Other; Phenergan [promethazine hcl]; Phenergan [promethazine hcl]; Phenothiazines; Pilocarpine; Prochlorperazine edisylate; Reglan [metoclopramide hcl]; Rofecoxib; Triavil [perphenazine-amitriptyline]; Ultracet [tramadol-acetaminophen]; Valium; Vioxx; Zofran odt [ondansetron]; Amlodipine; Amoxicillin; Augmentin [amoxicillin-pot clavulanate]; Avelox [moxifloxacin]; Bactrim [sulfamethoxazole-trimethoprim]; Biaxin [clarithromycin]; Cephalexin; Clonidine derivatives; Codeine; Coreg [carvedilol]; Demerol; Hydralazine; Levaquin [levofloxacin in d5w]; Maxalt [rizatriptan benzoate]; Morphine and related; Scopolamine; and Zonisamide    Social History:   TOBACCO:   reports that she has never smoked. She has never used smokeless tobacco.  ETOH:   reports no history of alcohol use.   DRUGS:   reports no history of drug use. Family History:       Problem Relation Age of Onset    Heart Disease Mother     Cancer Sister     Cancer Brother        REVIEW OF SYSTEMS:    CONSTITUTIONAL:  negative  RESPIRATORY:  negative  CARDIOVASCULAR:  negative  MUSCULOSKELETAL:  positive for  pain    PHYSICAL EXAM:      General: alert, appears stated age and cooperative   Skin: Skin intact, No Erythema and Positive for Edema   Extremity: Distal NVI   DVT Exam: No evidence of DVT seen on physical exam.  Negative Cristian's sign. No significant calf/ankle edema.      RLE: + dorsal pedis and posterior tibial pulse palpable, +sensation intact to light touch L4-S1, thigh and calf compartments soft and compressible, motor function intact ehl, df, pf.   Good cap refill <2 sec  Pain with log roll      DATA:        CBC with Differential:    Lab Results   Component Value Date    WBC 6.8 06/30/2020    RBC 4.15 06/30/2020    HGB 13.2 06/30/2020    HCT 38.9 06/30/2020     06/30/2020    MCV 93.7 06/30/2020    MCH 31.9 06/30/2020    MCHC 34.1 06/30/2020    RDW 13.8 06/30/2020    SEGSPCT 63.0 04/20/2013    BANDSPCT 4.0 12/09/2012    LYMPHOPCT 8.5 06/30/2020    MONOPCT 5.2 06/30/2020    EOSPCT 1.2 03/01/2012    BASOPCT 0.7 06/30/2020    MONOSABS 0.4 06/30/2020    LYMPHSABS 0.6 06/30/2020    EOSABS 0.0 06/30/2020    BASOSABS 0.0 06/30/2020    DIFFTYPE Auto 04/20/2013     CMP:    Lab Results   Component Value Date     06/30/2020    K 3.5 06/30/2020    CL 96 06/30/2020    CO2 24 06/30/2020    BUN 11 06/30/2020    CREATININE 0.9 06/30/2020    GFRAA >60 06/30/2020    GFRAA >60 05/30/2013    AGRATIO 1.8 06/29/2020    LABGLOM >60 06/30/2020    GLUCOSE 139 06/30/2020    PROT 6.9 06/29/2020    PROT 6.5 02/08/2013    CALCIUM 9.1 06/30/2020    BILITOT 0.8 06/29/2020    ALKPHOS 161 06/29/2020    AST 26 06/29/2020    ALT 14 06/29/2020     BMP:    Lab Results   Component Value Date     06/30/2020    K 3.5 06/30/2020    CL 96 06/30/2020    CO2 24 06/30/2020 Due to the patients advanced age and osteopenia they are at increased risk for hardware failure, non union and death. The patient accepted the above risks, possible complications and elects to proceed. All questions were answered appropriately, and they understand that they can contact me at any time to further discuss any questions or concerns. I recommend percutaneous pinning of their femoral neck fracture due to the non displaced nature of the fracture and the patients functional status  2) NPO, consent obtained, abx on call to OR  3) patient medically optimized  4) will continue to follow post operatively, will need SW for D/C planning        Thank you for the opportunity to consult on this patient.      Dee Veras

## 2020-06-30 NOTE — PROGRESS NOTES
Spoke with Fabian Mena in surgery at this time regarding patient's  and son staying down in surgery waiting area while patient in surgery and then leaving after surgery. He states that's okay and  and patient updated on plan of care.

## 2020-06-30 NOTE — PROGRESS NOTES
Patient resting in bed,  at bedside. Nitropaste placed on R chest. No distress noted. Call light in reach. Bed check on. Will continue to monitor.

## 2020-06-30 NOTE — H&P
Hospital Medicine History & Physical      PCP: Orlando Whelan MD    Date of Admission: 6/29/2020    Date of Service: Pt seen/examined on 6/29/2020 and Admitted to Inpatient with expected LOS greater than two midnights due to medical therapy     Chief Complaint:  S/P Fall      History Of Present Illness:     66 y.o. female who presents following a fall while she was vacuum cleaning. She denies presyncopal sensation, palpitations, chest pain, LOC. She states she's been having increasing dizziness for the past year, having recently fallen a couple of weeks ago. She denies fever, chills, beth, leg edema. Past Medical History:          Diagnosis Date    Abnormal LFT's     Arthritis     Atrial fibrillation ([address]     Follicular lymphoma (HCC)     Dr Mikel Orozco GERD (gastroesophageal reflux disease)     Glaucoma     Gout 11/2/2010    Hypertension     Liver disease     steatohepatitis    Migraine     Non-ulcer dyspepsia     egd neg 11/2011    Peripheral vascular [facility]     Thyroid disease        Past Surgical History:          Procedure Laterality Date    APPENDECTOMY      CATARACT REMOVAL      2007    CHOLECYSTECTOMY      2005    COLONOSCOPY  2/27/2015    polyp    ENDOSCOPY, COLON, DIAGNOSTIC  11/14/2011    bx stomach polyp    HYSTERECTOMY      LYMPH NODE BIOPSY      THYROIDECTOMY      78    TONSILLECTOMY      UPPER GASTROINTESTINAL ENDOSCOPY      UPPER GASTROINTESTINAL ENDOSCOPY  2/27/2015    gastric polyp       Medications Prior to Admission:      Prior to Admission medications    Medication Sig Start Date End Date Taking? Authorizing Provider   flecainide (TAMBOCOR) 50 MG tablet Take 50 mg by mouth 2 times daily   Yes Historical Provider, MD   amitriptyline (ELAVIL) 10 MG tablet Take 10 mg by mouth nightly   Yes Historical Provider, MD   UNKNOWN TO PATIENT Pt uses another eye drop once nightly but does not know the name of it.    Yes Historical Provider, MD   ondansetron (ZOFRAN ODT) 4 MG disintegrating tablet Take 1 tablet by mouth every 8 hours as needed for Nausea or Vomiting 1/30/20  Yes Garland Linares DO   minoxidil (LONITEN) 2.5 MG tablet Take 1 tablet by mouth 2 times daily for 90 days. 4/21/13 6/29/20 Yes Katia Henriquez MD   metoprolol (TOPROL-XL) 50 MG XL tablet Take 25 mg by mouth daily. Yes Historical Provider, MD   olmesartan (BENICAR) 40 MG tablet Take 40 mg by mouth daily. Yes Historical Provider, MD   omeprazole (PRILOSEC) 20 MG capsule Take 40 mg by mouth daily. Yes Historical Provider, MD   timolol (TIMOPTIC-XR) 0.5 % ophthalmic gel-forming Place 1 drop into both eyes 2 times daily. Yes Historical Provider, MD       Allergies:  Latex; Ferric carboxymaltose; Aldactone [spironolactone]; Baclofen; Cardizem [diltiazem hcl]; Dilaudid [hydromorphone hcl]; Droperidol; Guanfacine hcl; Hydrochlorothiazide; Ibuprofen; Lasix [furosemide]; Lorazepam; Nexium [esomeprazole magnesium trihydrate]; Nortriptyline; Other; Phenergan [promethazine hcl]; Phenergan [promethazine hcl]; Phenothiazines; Pilocarpine; Prochlorperazine edisylate; Reglan [metoclopramide hcl]; Rofecoxib; Triavil [perphenazine-amitriptyline]; Ultracet [tramadol-acetaminophen]; Valium; Vioxx; Zofran odt [ondansetron]; Amlodipine; Amoxicillin; Augmentin [amoxicillin-pot clavulanate]; Avelox [moxifloxacin]; Bactrim [sulfamethoxazole-trimethoprim]; Biaxin [clarithromycin]; Cephalexin; Clonidine derivatives; Codeine; Coreg [carvedilol]; Demerol; Hydralazine; Levaquin [levofloxacin in d5w]; Maxalt [rizatriptan benzoate]; Morphine and related; Scopolamine; and Zonisamide    Social History:           TOBACCO:   reports that she has never smoked. She has never used smokeless tobacco.  ETOH:   reports no history of alcohol use.       Family History:      (+) premature CAD - Mother in her 42's with onset CAD        Problem Relation Age of Onset    Heart Disease Mother     Cancer Sister     Cancer Brother Radiology:          CT HIP RIGHT WO CONTRAST   Final Result   1. Acute mildly impacted and rotated right femoral neck fracture. Normal   right hip alignment. 2. Abdominal and pelvic lymphadenopathy consistent with history of lymphoma. Images of a prior 2017 PET/CT could not be obtained at this time for   comparison. If they can be loaded into the system a comparison can be made   for interval change. Prior to signing the report to the 08/15/2017 PET/CT   became available for comparison. There is progressive lymphadenopathy   consistent with progressive lymphoma. CT Head WO Contrast   Final Result   Right frontal scalp injury without underlying fracture or acute intracranial   CT abnormality. XR TIBIA FIBULA RIGHT (2 VIEWS)   Final Result   No acute bony abnormality. XR HIP 2-3 VW W PELVIS RIGHT   Final Result   Subcapital right femoral neck fracture. ASSESSMENT:    Active Hospital Problems    Diagnosis Date Noted    Closed right hip fracture, initial encounter ([facility] [address] [S72.001A] 06/29/2020         PLAN:    1) Hip Fracture  - follow up troponin, EKG, urine studies  - has tolerated tramadol in the past, continue prn anti emetics    2) HTN  - patient states systolic pressures remain in the 200's since chemo  - asmptomatic    3) pAfib  - tele monitor, continue home meds    4) GERD  - continue PPI    DVT Prophylaxis: lovenox  Diet: Diet NPO, After Midnight  DIET CARDIAC;  Code Status: Full Code         Ivon Flores MD    Thank you Claude Punter, MD for the opportunity to be involved in this patient's care. If you have any questions or concerns please feel free to contact me at [phone].

## 2020-06-30 NOTE — PLAN OF CARE
Problem: Falls - Risk of:  Goal: Will remain free from falls  Description: Will remain free from falls  Outcome: Ongoing  Goal: Absence of physical injury  Description: Absence of physical injury  Outcome: Ongoing     Problem: SAFETY  Goal: Free from accidental physical injury  Outcome: Ongoing  Goal: Free from intentional harm  Outcome: Ongoing     Problem: DAILY CARE  Goal: Daily care needs are met  Outcome: Ongoing     Problem: PAIN  Goal: Patient's pain/discomfort is manageable  Outcome: Ongoing     Problem: SKIN INTEGRITY  Goal: Skin integrity is maintained or improved  Outcome: Ongoing     Problem: KNOWLEDGE DEFICIT  Goal: Patient/S.O. demonstrates understanding of disease process, treatment plan, medications, and discharge instructions. Outcome: Ongoing     Problem: DISCHARGE BARRIERS  Goal: Patient's continuum of care needs are met  Outcome: Ongoing     Problem:  Activity:  Goal: Ability to ambulate will improve  Description: Ability to ambulate will improve  Outcome: Ongoing  Goal: Ability to perform activities at highest level will improve  Description: Ability to perform activities at highest level will improve  Outcome: Ongoing     Problem: Health Behavior:  Goal: Identification of resources available to assist in meeting health care needs will improve  Description: Identification of resources available to assist in meeting health care needs will improve  Outcome: Ongoing     Problem: Nutritional:  Goal: Ability to attain and maintain optimal nutritional status will improve  Description: Ability to attain and maintain optimal nutritional status will improve  Outcome: Ongoing     Problem: Physical Regulation:  Goal: Will remain free from infection  Description: Will remain free from infection  Outcome: Ongoing  Goal: Postoperative complications will be avoided or minimized  Description: Postoperative complications will be avoided or minimized  Outcome: Ongoing  Goal: Diagnostic test results will improve  Description: Diagnostic test results will improve  Outcome: Ongoing     Problem: Respiratory:  Goal: Ability to maintain adequate ventilation will improve  Description: Ability to maintain adequate ventilation will improve  Outcome: Ongoing     Problem: Safety:  Goal: Ability to remain free from injury will improve  Description: Ability to remain free from injury will improve  Outcome: Ongoing     Problem: Self-Care:  Goal: Ability to meet self-care needs will improve  Description: Ability to meet self-care needs will improve  Outcome: Ongoing     Problem: Self-Concept:  Goal: Ability to maintain and perform role responsibilities to the fullest extent possible will improve  Description: Ability to maintain and perform role responsibilities to the fullest extent possible will improve  Outcome: Ongoing  Goal: Verbalizations of decreased anxiety will increase  Description: Verbalizations of decreased anxiety will increase  Outcome: Ongoing     Problem: Sensory:  Goal: Pain level will decrease  Description: Pain level will decrease  Outcome: Ongoing     Problem: Skin Integrity:  Goal: Demonstration of wound healing without infection will improve  Description: Demonstration of wound healing without infection will improve  Outcome: Ongoing  Goal: Risk for impaired skin integrity will decrease  Description: Risk for impaired skin integrity will decrease  Outcome: Ongoing     Problem: Tissue Perfusion:  Goal: Peripheral tissue perfusion will improve  Description: Peripheral tissue perfusion will improve  Outcome: Ongoing  Goal: Risk of venous thrombosis will decrease  Description: Risk of venous thrombosis will decrease  Outcome: Ongoing     Problem: Urinary Elimination:  Goal: Ability to reestablish a normal urinary elimination pattern will improve - after catheter removal  Description: Ability to reestablish a normal urinary elimination pattern will improve  Outcome: Ongoing

## 2020-06-30 NOTE — PLAN OF CARE
Ortho POC for: Right hip fracture    HPI: The patient was at home when she fell. She does not know how or why she fell. She states \"they\" think she might have neuropathy which caused the fall, due to chemo. The patient and her  called 46 and she was brought here by squad. She typically uses a walker at home, but also has a cane. Examination: RLE ER at rest with hip and knee slightly flexed. Moving foot and ankle, good pulse. NVI. No ecchymosis or erythema. Radiology:   CT HIP RIGHT WO CONTRAST   Final Result   1. Acute mildly impacted and rotated right femoral neck fracture. Normal   right hip alignment. 2. Abdominal and pelvic lymphadenopathy consistent with history of lymphoma. Images of a prior 2017 PET/CT could not be obtained at this time for   comparison. If they can be loaded into the system a comparison can be made   for interval change. Prior to signing the report to the 08/15/2017 PET/CT   became available for comparison. There is progressive lymphadenopathy   consistent with progressive lymphoma. CT Head WO Contrast   Final Result   Right frontal scalp injury without underlying fracture or acute intracranial   CT abnormality. XR TIBIA FIBULA RIGHT (2 VIEWS)   Final Result   No acute bony abnormality. XR HIP 2-3 VW W PELVIS RIGHT   Final Result   Subcapital right femoral neck fracture. Assessment: Right femoral neck fracture    Plan: Our recommendation is for surgical intervention. Plan for OR today at for right hip perc pinning with Dr. Micha Cline at 0256. Patient with many allergies, Clinda on call to OR. Remain NPO, RN to verify consent. Medicine to clear patient for OR. Discussed POC with patient and her , all questions answered and in agreement.     Thomas Ruiz, CNP

## 2020-06-30 NOTE — PROGRESS NOTES
admission assessment and questions complete; see flow sheet. IV flushed without difficulty. Pt denies pain at this time. Pt BP high at 246/100, notified MD. Pt denies any needs at this time.  Pt educated on use of call light and to call out with needs, verbalized understanding, bed in low locked position for pt safety

## 2020-06-30 NOTE — PROGRESS NOTES
Per verbal order with repeat back dr Liam Bailey ordered zithromax 250mg ivpb x1 dose now for surgery

## 2020-06-30 NOTE — ED NOTES
PerfectServe sent to Dr. Ligia Mcgee with Orthopedic Surgery at 2042    PerfectServe sent to Dr. Anna Tripathi at 2043     Kaz Parmar  06/29/20 2045    PerfectServe completed with call back from Dr. Ligia Mcgee at 2044     Kaz Parmar  06/29/20 2048    PerfectServe completed with call back from Dr. Anna Tripathi at 2115     Kaz Parmar  06/29/20 2121

## 2020-06-30 NOTE — PROGRESS NOTES
Progress Note    Admit Date:  6/29/2020    Admitted for R hip fracture     Subjective:  Ms. Aliza Hardin reports she is having a lot of pain. Does not tolerate pain medications well. Objective:   Patient Vitals for the past 4 hrs:   BP Temp Temp src Pulse SpO2   06/30/20 0850 (!) 173/75 98.4 °F (36.9 °C) Oral 87 94 %        No intake or output data in the 24 hours ending 06/30/20 0907    Physical Exam:  Gen: Elderly female, No distress. Alert. Eyes:  No conjunctival injection. HENT: contusion/hematoma to R frontal scalp, No discharge. Pharynx clear. Neck: No JVD. Trachea midline. Resp: No accessory muscle use. No crackles. No wheezes. No rhonchi. CV: Regular rate. Regular rhythm. No murmur. No rub. No edema. Capillary Refill: Brisk,< 3 seconds   Peripheral Pulses: +2 palpable, equal bilaterally  GI: Non-tender. Non-distended. Normal bowel sounds. Skin: Warm and dry. .   M/S: R hip externally rotated, good ROM of R ankle and toes  Neuro: Awake. Grossly nonfocal    Psych: Oriented x 3. No anxiety or agitation. I Toshia Howard have reviewed the chart on [facility] and personally interviewed and examined patient, reviewed the data (labs and imaging) and after discussion with my PA formulated the plan. Agree with note with the following edits. HPI:     I reviewed the patient's Past Medical History, Past Surgical History, Medications, and Allergies. Fall and right femur fracture  High BP but reports intolerance to several meds        General: elderly female  Awake, alert and oriented. Appears to be not in any distress  Mucous Membranes:  Pink , anicteric  Neck: No JVD, no carotid bruit, no thyromegaly  Chest:  Clear to auscultation bilaterally, no added sounds  Left PAC  Cardiovascular:  RRR S1S2 heard, no murmurs or gallops  Abdomen:  Soft, undistended, non tender, no organomegaly, BS present  Extremities: right hip with severe pain .  Mild decreased temp in right foot but able to move normally at ankle and toes. No edema or cyanosis. Distal pulses well felt  Neurological : grossly normal            Scheduled Meds:   doxazosin  4 mg Oral Daily    flecainide  75 mg Oral BID    metoprolol succinate  25 mg Oral Daily    pantoprazole  40 mg Oral QAM AC    timolol  1 drop Both Eyes BID    sodium chloride flush  10 mL Intravenous 2 times per day    enoxaparin  40 mg Subcutaneous Daily    losartan  100 mg Oral Daily       Continuous Infusions:      PRN Meds:  traMADol, ondansetron, sodium chloride flush, acetaminophen **OR** acetaminophen, polyethylene glycol, [DISCONTINUED] promethazine **OR** ondansetron, potassium chloride      Data:  CBC:   Recent Labs     06/29/20 1950 06/30/20 0211   WBC 6.9 6.8   HGB 13.5 13.2   HCT 39.5 38.9   MCV 94.0 93.7   * 106*     BMP:   Recent Labs     06/29/20 1950 06/30/20  0211    138   K 3.3* 3.5   CL 98* 96*   CO2 24 24   BUN 12 11   CREATININE 1.0 0.9     LIVER PROFILE:   Recent Labs     06/29/20 1950   AST 26   ALT 14   BILITOT 0.8   ALKPHOS 161*       CULTURES  None      RADIOLOGY  CT HIP RIGHT WO CONTRAST   Final Result   1. Acute mildly impacted and rotated right femoral neck fracture. Normal   right hip alignment. 2. Abdominal and pelvic lymphadenopathy consistent with history of lymphoma. Images of a prior 2017 PET/CT could not be obtained at this time for   comparison. If they can be loaded into the system a comparison can be made   for interval change. Prior to signing the report to the 08/15/2017 PET/CT   became available for comparison. There is progressive lymphadenopathy   consistent with progressive lymphoma. CT Head WO Contrast   Final Result   Right frontal scalp injury without underlying fracture or acute intracranial   CT abnormality. XR TIBIA FIBULA RIGHT (2 VIEWS)   Final Result   No acute bony abnormality.          XR HIP 2-3 VW W PELVIS RIGHT   Final Result   Subcapital right femoral neck fracture.              Assessment/Plan:    Rt subcapital femoral neck  fracture   - s/p mechanical fall at home   - mildly impacted and rotated   - NWB, PRN pain control - reports intolerance to several meds  - Orthopedics consult ed  - Medically cleared for OR today at 6:30 pm (6/30/20)    Hypokalemia   - mild, 3.3  - sliding scale replacement    TCP  - baseline ~120  - PLT on admission 119-->106  - Chronic, stable     Hx of Follicular Lymphoma   - CT of R hip shows progressive lymphadenopathy concerning for progressive lymphoma   - Follows with Dr. Yanira Pepper, had recent OP visit   - Has completed chemo in the past   - Will need OP follow up regarding new changes on CT-->discused with patient and , she did not want hem/onc consult during admission and would like to see her oncologist OP, reports no acute sx     HTN  - BP is uncontrolled - reports intolerance to several meds and BP is always high in 200s'  - Continue home BB,  cozaar   - can add nitrates as she did not try them  - refuses cardura    GERD  - Continue PPI     Paroxysmal Atrial fibrillation   - not on AC sec to GI bleed   - Continue BB, flecainide    - rate is controlled     DVT Prophylaxis: Lovenox  Diet: Diet NPO, After Midnight  Code Status: Full Code    Carlos Crowell 9:07 AM 6/30/2020    Agree with above  Changes made to note    Gordy Mayfield MD 6/30/2020 1:56 PM

## 2020-06-30 NOTE — ANESTHESIA PRE PROCEDURE
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    Migraine G43.909    Arrhythmia I49.9    Gout M10.9    Diarrhea R19.7    Ileus (HCC) K56.7    PAF (paroxysmal atrial fibrillation) (HCC) I48.0    N&V (nausea and vomiting) R11.2    Anemia D64.9    Hypokalemia E87.6    Vomiting and diarrhea R11.10, R19.7    Partial small bowel obstruction (HCC) K56.600    GERD (gastroesophageal reflux disease) K21.9    Thyroid disease E07.9    Small bowel obstruction (HCC) K56.609    Chest pain R07.9    HTN (hypertension), malignant I10    Hypertensive left ventricular hypertrophy, without heart failure D14.6    Diastolic dysfunction B72.99    [facility] S72.001A       Past Medical History:        Diagnosis Date    Abnormal LFT's     Arthritis     Atrial fibrillation (HCC)     Follicular lymphoma (HCC)     Dr Dee Cotter GERD (gastroesophageal reflux disease)     Glaucoma     Gout 11/2/2010    Hypertension     Liver disease     steatohepatitis    Migraine     Non-ulcer dyspepsia     egd neg 11/2011    Peripheral vascular disease ([facility]     Thyroid disease        Past Surgical History:        Procedure Laterality Date    APPENDECTOMY      CATARACT REMOVAL      2007    CHOLECYSTECTOMY      2005    COLONOSCOPY  2/27/2015    polyp    ENDOSCOPY, COLON, DIAGNOSTIC  11/14/2011    bx stomach polyp    HYSTERECTOMY      LYMPH NODE BIOPSY      THYROIDECTOMY      78    TONSILLECTOMY      UPPER GASTROINTESTINAL ENDOSCOPY      UPPER GASTROINTESTINAL ENDOSCOPY  2/27/2015    gastric polyp       Social History:    Social History Tobacco Use    Smoking status: Never Smoker    Smokeless tobacco: Never Used   Substance Use Topics    Alcohol use: No                                Counseling given: Not Answered      Vital Signs (Current):   Vitals:    06/30/20 0445 06/30/20 0850 06/30/20 1427 06/30/20 1701   BP: (!) 236/98 (!) 173/75 (!) 202/97 (!) 226/91   Pulse: 83 87 74 72   Resp: 16 18 18    Temp: 98.2 °F (36.8 °C) 98.4 °F (36.9 °C) 98.3 °F (36.8 °C)    TempSrc: Oral Oral Oral    SpO2: 98% 94% 97%    Weight:       Height:                                                  BP Readings from Last 3 Encounters:   06/30/20 (!) 226/91   06/30/20 (!) 94/40   01/30/20 (!) 191/77       NPO Status: Time of last liquid consumption: 2359                        Time of last solid consumption: 2359                        Date of last liquid consumption: 06/29/20                        Date of last solid food consumption: 06/29/20    BMI:   Wt Readings from Last 3 Encounters:   06/29/20 111 lb 4.8 oz (50.5 kg)   01/30/20 114 lb (51.7 kg)   12/16/19 112 lb 10.5 oz (51.1 kg)     Body mass index is 21.74 kg/m². CBC:   Lab Results   Component Value Date    WBC 6.8 06/30/2020    RBC 4.15 06/30/2020    HGB 13.2 06/30/2020    HCT 38.9 06/30/2020    MCV 93.7 06/30/2020    RDW 13.8 06/30/2020     06/30/2020       CMP:   Lab Results   Component Value Date     06/30/2020    K 3.5 06/30/2020    CL 96 06/30/2020    CO2 24 06/30/2020    BUN 11 06/30/2020    CREATININE 0.9 06/30/2020    GFRAA >60 06/30/2020    GFRAA >60 05/30/2013    AGRATIO 1.8 06/29/2020    LABGLOM >60 06/30/2020    GLUCOSE 139 06/30/2020    PROT 6.9 06/29/2020    PROT 6.5 02/08/2013    CALCIUM 9.1 06/30/2020    BILITOT 0.8 06/29/2020    ALKPHOS 161 06/29/2020    AST 26 06/29/2020    ALT 14 06/29/2020       POC Tests: No results for input(s): POCGLU, POCNA, POCK, POCCL, POCBUN, POCHEMO, POCHCT in the last 72 hours.     Coags:   Lab Results   Component Value Date    PROTIME 12.9 04/19/2013    INR 1.14 04/19/2013    APTT 33.6 04/19/2013       HCG (If Applicable): No results found for: PREGTESTUR, PREGSERUM, HCG, HCGQUANT     ABGs: No results found for: PHART, PO2ART, XAX1UCX, MQC8AMR, BEART, G5BLJWXD     Type & Screen (If Applicable):  No results found for: LABABO, LABRH    Drug/Infectious Status (If Applicable):  No results found for: HIV, HEPCAB    COVID-19 Screening (If Applicable):   Lab Results   Component Value Date    COVID19 Not Detected 06/30/2020         Anesthesia Evaluation     history of anesthetic complications: difficult airway. Airway: Mallampati: III  TM distance: <3 FB   Neck ROM: limited  Mouth opening: > = 3 FB Dental: normal exam         Pulmonary:                              Cardiovascular:    (+) hypertension: severe, dysrhythmias: atrial fibrillation,                ROS comment: Diastolic dysfunction     Neuro/Psych:   (+) headaches: migraine headaches,             GI/Hepatic/Renal:   (+) GERD: well controlled,           Endo/Other:                     Abdominal:           Vascular: negative vascular ROS. Anesthesia Plan      general     ASA 3     (Risks, benefits and alternatives of GETA for operative care and ultrasound guided fascia iliaca compartment nerve block for post operative analgesia discussed with pt. All questions answered. Willing to proceed. Pt DNR in hospital.  Discussed limited resuscitation at length)  Induction: intravenous. Anesthetic plan and risks discussed with patient, spouse and child/children.                       Maye Mesa MD   6/30/2020

## 2020-06-30 NOTE — PROGRESS NOTES
Patient resting in bed, NPO for surgery later today. Lungs clear. BS+. L POC intact. Meds given. No acute distress noted. Call light in reach. Will continue to monitor.

## 2020-06-30 NOTE — OP NOTE
Brayden Gonzalez (1945)    6/30/2020 Date of Surgery    Femoral Neck Fracture, Percutaneous Pinning    Preoperative Diagnosis- Femoral neck fracture, right hip    Postoperative Diagnosis- Femoral neck fracture, right hip    Procedure- 1. Percutaneous pinning, right femoral neck ([id number])                     2.  X-ray exam-hip ([id number])             Surgeon- Zoya Bowers    Assistant(s)-  Surgical Assistant: (Unknown)  Scrub Person First: (Unknown)    Anesthesia- General    EBL- Minimal    Implants- 6.5 mm cannulated screws with 16 mm threads, Quantity #3 75mmx2, 70mmx1    Surgical Indications  The patient presented to the emergency room and was diagnosed with a femoral neck fracture. The patient was admitted to the hospital and received medical clearance. The patient chose to proceed with hip pinning. closed reduction as needed with hip pinning. Risks, benefits, expected outcomes and potential complications were discussed. At no time were any guarantees implied or stated. The patient electively signed the consent form. Patient Positioning and Surgical Prep  The patient was seen in the holding area and the appropriate extremity marked with an indelible pen. The patient was taken to the operative suite, identified and while on the hospital bed, spinal anesthesia was administered. The patient was transferred to the fracture table. The affected leg was placed in boot traction after the foot was well padded. The unaffected leg was gently abducted and externally rotated. The fracture was well visualized using biplanar fluoroscopy. The lower extremity was prepped from the flank to knee with Duraprep and then draped in the normal sterile fashion. Exposure  An incision was made along the lateral aspect of the thigh. The fascia and muscle were split in line with their fibers. A handheld pin guide as placed along the lateral aspect of the thigh proximal to the lesser trochanter.   Using biplanar, c-arm fluoroscopy, the guide pins were drilled across the femoral neck into the subchondral bone of the femoral head. The lengths of the guide pins were measured. The cannulated drill guide was advanced over each pin. The appropriate length 7.3 mm screws were then inserted over each pin prior to guide pin removal.  Final fluoroscopic views were obtained. Closure and Disposition  The wounds were copiously irrigated and closed in layers. Sterile dressings were applied. All sponge and needle counts were correct. The patient was awakened and taken to the postoperative area in stable condition. I spoke with the patients family in the consultation room postoperatively. ADDENDUM:  Two radiographic fluoroscopic views (AP and Lateral) of the right hip were obtained case to confirm satisfactory fracture reduction and placement of all hardware.       Felicity Lema

## 2020-06-30 NOTE — PROGRESS NOTES
4 Eyes Skin Assessment     The patient is being assess for   Admission    I agree that 2 RN's have performed a thorough Head to Toe Skin Assessment on the patient. ALL assessment sites listed below have been assessed. Areas assessed by both nurses:   [x]   Head, Face, and Ears   [x]   Shoulders, Back, and Chest, Abdomen  [x]   Arms, Elbows, and Hands   [x]   Coccyx, Sacrum, and Ischium  [x]   Legs, Feet, and Heels        Scattered bruising, Bump on right side of forehead. **SHARE this note so that the co-signing nurse is able to place an eSignature**    Co-signer eSignature: {Esignature:[id number]}    Does the Patient have Skin Breakdown?   No          Joel Prevention initiated:  Yes   Wound Care Orders initiated:  No      [facility] nurse consulted for Pressure Injury (Stage 3,4, Unstageable, DTI, NWPT, Complex wounds)and New or Established Ostomies:  No      Primary Nurse eSignature: Electronically signed by Valentine Roberto RN on 6/30/20 at 5:29 AM EDT

## 2020-06-30 NOTE — PROGRESS NOTES
Patient is not able to demonstrate the ability to move from a reclining position to an upright position within the recliner.  however patient is alert, oriented and able to provide informed consent

## 2020-07-01 LAB
AMORPHOUS: ABNORMAL /HPF
ANION GAP SERPL CALCULATED.3IONS-SCNC: 13 MMOL/L (ref 3–16)
BASOPHILS ABSOLUTE: 0 K/UL (ref 0–0.2)
BASOPHILS RELATIVE PERCENT: 0.6 %
BILIRUBIN URINE: ABNORMAL
BLOOD, URINE: ABNORMAL
BUN BLDV-MCNC: 12 MG/DL (ref 7–20)
CALCIUM SERPL-MCNC: 8.6 MG/DL (ref 8.3–10.6)
CHLORIDE BLD-SCNC: 97 MMOL/L (ref 99–110)
CLARITY: CLEAR
CO2: 26 MMOL/L (ref 21–32)
COLOR: YELLOW
CREAT SERPL-MCNC: 0.9 MG/DL (ref 0.6–1.2)
EOSINOPHILS ABSOLUTE: 0.4 K/UL (ref 0–0.6)
EOSINOPHILS RELATIVE PERCENT: 5.7 %
EPITHELIAL CELLS, UA: ABNORMAL /HPF (ref 0–5)
GFR AFRICAN AMERICAN: >60
GFR NON-AFRICAN AMERICAN: >60
GLUCOSE BLD-MCNC: 107 MG/DL (ref 70–99)
GLUCOSE URINE: NEGATIVE MG/DL
HCT VFR BLD CALC: 33.4 % (ref 36–48)
HEMOGLOBIN: 11.4 G/DL (ref 12–16)
KETONES, URINE: 40 MG/DL
LEUKOCYTE ESTERASE, URINE: NEGATIVE
LYMPHOCYTES ABSOLUTE: 0.7 K/UL (ref 1–5.1)
LYMPHOCYTES RELATIVE PERCENT: 9.8 %
MCH RBC QN AUTO: 31.3 PG (ref 26–34)
MCHC RBC AUTO-ENTMCNC: 34 G/DL (ref 31–36)
MCV RBC AUTO: 92.2 FL (ref 80–100)
MICROSCOPIC EXAMINATION: YES
MONOCYTES ABSOLUTE: 0.6 K/UL (ref 0–1.3)
MONOCYTES RELATIVE PERCENT: 8.5 %
NEUTROPHILS ABSOLUTE: 5.6 K/UL (ref 1.7–7.7)
NEUTROPHILS RELATIVE PERCENT: 75.4 %
NITRITE, URINE: NEGATIVE
PDW BLD-RTO: 13.9 % (ref 12.4–15.4)
PH UA: 5.5 (ref 5–8)
PLATELET # BLD: 105 K/UL (ref 135–450)
PMV BLD AUTO: 8.8 FL (ref 5–10.5)
POTASSIUM REFLEX MAGNESIUM: 3.7 MMOL/L (ref 3.5–5.1)
PROTEIN UA: 30 MG/DL
RBC # BLD: 3.63 M/UL (ref 4–5.2)
RBC UA: ABNORMAL /HPF (ref 0–4)
RENAL EPITHELIAL, UA: ABNORMAL /HPF (ref 0–1)
SODIUM BLD-SCNC: 136 MMOL/L (ref 136–145)
SPECIFIC GRAVITY UA: >=1.03 (ref 1–1.03)
URINE REFLEX TO CULTURE: ABNORMAL
URINE TYPE: ABNORMAL
UROBILINOGEN, URINE: 0.2 E.U./DL
WBC # BLD: 7.5 K/UL (ref 4–11)
WBC UA: ABNORMAL /HPF (ref 0–5)

## 2020-07-01 PROCEDURE — 81001 URINALYSIS AUTO W/SCOPE: CPT

## 2020-07-01 PROCEDURE — 6370000000 HC RX 637 (ALT 250 FOR IP): Performed by: ORTHOPAEDIC SURGERY

## 2020-07-01 PROCEDURE — 36591 DRAW BLOOD OFF VENOUS DEVICE: CPT

## 2020-07-01 PROCEDURE — 2580000003 HC RX 258: Performed by: ORTHOPAEDIC SURGERY

## 2020-07-01 PROCEDURE — 97162 PT EVAL MOD COMPLEX 30 MIN: CPT

## 2020-07-01 PROCEDURE — 97530 THERAPEUTIC ACTIVITIES: CPT

## 2020-07-01 PROCEDURE — 2580000003 HC RX 258: Performed by: PHYSICIAN ASSISTANT

## 2020-07-01 PROCEDURE — 1200000000 HC SEMI PRIVATE

## 2020-07-01 PROCEDURE — 80048 BASIC METABOLIC PNL TOTAL CA: CPT

## 2020-07-01 PROCEDURE — 99232 SBSQ HOSP IP/OBS MODERATE 35: CPT | Performed by: INTERNAL MEDICINE

## 2020-07-01 PROCEDURE — 85025 COMPLETE CBC W/AUTO DIFF WBC: CPT

## 2020-07-01 PROCEDURE — 97166 OT EVAL MOD COMPLEX 45 MIN: CPT

## 2020-07-01 PROCEDURE — 6370000000 HC RX 637 (ALT 250 FOR IP): Performed by: HOSPITALIST

## 2020-07-01 PROCEDURE — 6360000002 HC RX W HCPCS: Performed by: ORTHOPAEDIC SURGERY

## 2020-07-01 RX ORDER — TRAMADOL HYDROCHLORIDE 50 MG/1
50 TABLET ORAL EVERY 6 HOURS PRN
Status: DISCONTINUED | OUTPATIENT
Start: 2020-07-01 | End: 2020-07-06 | Stop reason: HOSPADM

## 2020-07-01 RX ORDER — TRAMADOL HYDROCHLORIDE 50 MG/1
25 TABLET ORAL EVERY 6 HOURS PRN
Status: DISCONTINUED | OUTPATIENT
Start: 2020-07-01 | End: 2020-07-06 | Stop reason: HOSPADM

## 2020-07-01 RX ORDER — TRAMADOL HYDROCHLORIDE 50 MG/1
50 TABLET ORAL EVERY 6 HOURS PRN
Qty: 12 TABLET | Refills: 0 | Status: SHIPPED | OUTPATIENT
Start: 2020-07-01 | End: 2020-07-01 | Stop reason: HOSPADM

## 2020-07-01 RX ORDER — LATANOPROST 50 UG/ML
1 SOLUTION/ DROPS OPHTHALMIC NIGHTLY
COMMUNITY
End: 2021-09-21

## 2020-07-01 RX ORDER — TRAMADOL HYDROCHLORIDE 50 MG/1
25-50 TABLET ORAL EVERY 6 HOURS PRN
Qty: 12 TABLET | Refills: 0 | Status: SHIPPED | OUTPATIENT
Start: 2020-07-01 | End: 2020-07-04

## 2020-07-01 RX ORDER — SODIUM CHLORIDE, SODIUM LACTATE, POTASSIUM CHLORIDE, AND CALCIUM CHLORIDE .6; .31; .03; .02 G/100ML; G/100ML; G/100ML; G/100ML
500 INJECTION, SOLUTION INTRAVENOUS ONCE
Status: COMPLETED | OUTPATIENT
Start: 2020-07-01 | End: 2020-07-01

## 2020-07-01 RX ORDER — FLECAINIDE ACETATE 100 MG/1
50 TABLET ORAL EVERY 12 HOURS SCHEDULED
Status: DISCONTINUED | OUTPATIENT
Start: 2020-07-01 | End: 2020-07-06 | Stop reason: HOSPADM

## 2020-07-01 RX ADMIN — SODIUM CHLORIDE, POTASSIUM CHLORIDE, SODIUM LACTATE AND CALCIUM CHLORIDE: 600; 310; 30; 20 INJECTION, SOLUTION INTRAVENOUS at 09:13

## 2020-07-01 RX ADMIN — SODIUM CHLORIDE, POTASSIUM CHLORIDE, SODIUM LACTATE AND CALCIUM CHLORIDE: 600; 310; 30; 20 INJECTION, SOLUTION INTRAVENOUS at 14:18

## 2020-07-01 RX ADMIN — Medication 10 ML: at 09:22

## 2020-07-01 RX ADMIN — SENNOSIDES AND DOCUSATE SODIUM 1 TABLET: 8.6; 5 TABLET ORAL at 21:52

## 2020-07-01 RX ADMIN — SODIUM CHLORIDE, POTASSIUM CHLORIDE, SODIUM LACTATE AND CALCIUM CHLORIDE: 600; 310; 30; 20 INJECTION, SOLUTION INTRAVENOUS at 23:42

## 2020-07-01 RX ADMIN — TIMOLOL MALEATE 1 DROP: 5 SOLUTION/ DROPS OPHTHALMIC at 22:07

## 2020-07-01 RX ADMIN — Medication 10 ML: at 21:53

## 2020-07-01 RX ADMIN — DOXAZOSIN 4 MG: 2 TABLET ORAL at 09:21

## 2020-07-01 RX ADMIN — METOPROLOL SUCCINATE 25 MG: 25 TABLET, EXTENDED RELEASE ORAL at 09:21

## 2020-07-01 RX ADMIN — SODIUM CHLORIDE, POTASSIUM CHLORIDE, SODIUM LACTATE AND CALCIUM CHLORIDE 500 ML: 600; 310; 30; 20 INJECTION, SOLUTION INTRAVENOUS at 10:15

## 2020-07-01 RX ADMIN — AZITHROMYCIN MONOHYDRATE 250 MG: 500 INJECTION, POWDER, LYOPHILIZED, FOR SOLUTION INTRAVENOUS at 19:45

## 2020-07-01 RX ADMIN — PANTOPRAZOLE SODIUM 40 MG: 40 TABLET, DELAYED RELEASE ORAL at 05:37

## 2020-07-01 RX ADMIN — TIMOLOL MALEATE 1 DROP: 5 SOLUTION/ DROPS OPHTHALMIC at 09:21

## 2020-07-01 RX ADMIN — FLECAINIDE ACETATE 50 MG: 100 TABLET ORAL at 09:21

## 2020-07-01 RX ADMIN — NITROGLYCERIN 0.5 INCH: 20 OINTMENT TOPICAL at 05:37

## 2020-07-01 RX ADMIN — SENNOSIDES AND DOCUSATE SODIUM 1 TABLET: 8.6; 5 TABLET ORAL at 09:20

## 2020-07-01 RX ADMIN — LOSARTAN POTASSIUM 100 MG: 100 TABLET, FILM COATED ORAL at 09:21

## 2020-07-01 RX ADMIN — FLECAINIDE ACETATE 50 MG: 100 TABLET ORAL at 21:52

## 2020-07-01 NOTE — PROGRESS NOTES
Pt is alert and oriented, pain rate a 3, and pt denied needing anything else for pain  She received 25mcg iv push fentanyl x 2 dose for pain rate of 7 located in right hip and right shoulder, her oxygen dropped a few minutes after med was administered and she then received 2-3 liters of supplemental oxygen via NC for 10-15 mins, then took off NC and she was able to maintain o2 sats 95% and above     Bedside reporting given to [employer] [facility] [place] at bedside, pt's family had been updated by Dr Renee Flores and were not in waiting room when pt was ready to return to [facility]    Dr Jyoti Fallon assessed pt and gave verbal order with repeat back that she was stable for transfer back to [facility]    Her port was capped, and she received an aroma tab, icepacks, decreased lighting, and repositioning for alternative comfort measures     Pt left via bed with transport

## 2020-07-01 NOTE — PROGRESS NOTES
(36.8 °C) Oral 74 18 97 % --       General: alert, appears stated age, cooperative and no distress   Wound: Wound clean and dry no evidence of infection. Motion: Painful range of Motion   DVT Exam: No evidence of DVT seen on physical exam.       NVI in lower extremity. Thigh swollen but soft. Moving foot and ankle. Data Review  CBC:   Lab Results   Component Value Date    WBC 7.5 07/01/2020    RBC 3.63 07/01/2020    HGB 11.4 07/01/2020    HCT 33.4 07/01/2020     07/01/2020       Assessment:     Status Post right hip perc pin. Doing well postoperatively. Plan:      1: Continues current post-op course : Continue current plan of care per medicine. Post-op labs reviewed and stable. 500 cc bolus per medicine due to low BP earlier today with therapy. 2:  Continue Deep venous thrombosis prophylaxis - Lovenox  3:  Continue physical therapy, OT, TTWB  4:  Continue Pain Control  5:  Discharge planning pending therapy eval given patient was very limited this morning with low BP. Therapy recommending SNF, pt has stated she has fallen 9 times in the past year and may benefit from continued therapy. CM following. Scripts in script folder, discharge instructions completed.     Migdalia West, ALAINA

## 2020-07-01 NOTE — PROGRESS NOTES
Progress Note    Admit Date:  6/29/2020    Admitted for R hip fracture     Subjective:  Ms. Naresh Ann is s/p percutaneous pinning of R femoral neck on 6/30 with Dr. Courtney Stevenson. Reports improved pain since surgery. Feeling tired this morning but no chest pain/, SOB, N/V. Has poor appetite though. Objective:   Vitals:    07/01/20 0347   BP: (!) 147/70   Pulse: 71   Resp: 16   Temp: 97.9 °F (36.6 °C)   SpO2: 99%       Intake/Output Summary (Last 24 hours) at 7/1/2020 0846  Last data filed at 7/1/2020 0536  Gross per 24 hour   Intake 1127 ml   Output 800 ml   Net 327 ml       Physical Exam:  Gen: Elderly female, No distress. Alert. Eyes:  No conjunctival injection. HENT: contusion/hematoma to R frontal scalp, No discharge. Pharynx clear. Neck:   Trachea midline. Resp: No accessory muscle use. No crackles. No wheezes. No rhonchi. CV: Regular rate. Regular rhythm. No murmur. No rub. No edema. Capillary Refill: Brisk,< 3 seconds   Peripheral Pulses: +2 palpable, equal bilaterally  GI: Non-tender. Non-distended. Normal bowel sounds. Skin: Warm and dry. C/d/i to the R hip, surrounding ecchymosis  M/S: s/p R hip surgery with expected post op edema, good ROM of R ankle  Neuro: Awake. Grossly nonfocal, generalized weakness  Psych: Oriented x 3. No anxiety or agitation. I Mynor Rey have reviewed the chart on Kriss Zavaleta and personally interviewed and examined patient, reviewed the data (labs and imaging) and after discussion with my PA formulated the plan. Agree with note with the following edits. HPI:     I reviewed the patient's Past Medical History, Past Surgical History, Medications, and Allergies.      Fall and right femur fracture- s.p ORIF  Drowsy still , pain controlled  Dropped BP        General: elderly female  Drowsy but arousable  Appears to be not in any distress  Mucous Membranes:  Pink , anicteric  Neck: No JVD, no carotid bruit, no thyromegaly  Chest:  Clear to auscultation bilaterally, no added sounds  Left PAC  Cardiovascular:  RRR S1S2 heard, no murmurs or gallops  Abdomen:  Soft, undistended, non tender, no organomegaly, BS present  Extremities: right hip with severe pain and expected edema, surgical dressing dry  . No edema or cyanosis. Distal pulses well felt  Neurological : grossly normal with mild drowsiness            Scheduled Meds:   doxazosin  4 mg Oral Daily    nitroglycerin  0.5 inch Topical 4 times per day    sennosides-docusate sodium  1 tablet Oral BID    azithromycin (ZITHROMAX) 500 mg IVPB  250 mg Intravenous Q24H    flecainide  75 mg Oral BID    metoprolol succinate  25 mg Oral Daily    pantoprazole  40 mg Oral QAM AC    timolol  1 drop Both Eyes BID    sodium chloride flush  10 mL Intravenous 2 times per day    enoxaparin  40 mg Subcutaneous Daily    losartan  100 mg Oral Daily       Continuous Infusions:   lactated ringers 100 mL/hr at 06/30/20 2347       PRN Meds:  HYDROmorphone, magnesium hydroxide, traMADol, ondansetron, sodium chloride flush, acetaminophen **OR** acetaminophen, polyethylene glycol, [DISCONTINUED] promethazine **OR** ondansetron, potassium chloride      Data:  CBC:   Recent Labs     06/29/20 1950 06/30/20 0211 07/01/20  0735   WBC 6.9 6.8 7.5   HGB 13.5 13.2 11.4*   HCT 39.5 38.9 33.4*   MCV 94.0 93.7 92.2   * 106* 105*     BMP:   Recent Labs     06/29/20 1950 06/30/20 0211 07/01/20  0735    138 136   K 3.3* 3.5 3.7   CL 98* 96* 97*   CO2 24 24 26   BUN 12 11 12   CREATININE 1.0 0.9 0.9     LIVER PROFILE:   Recent Labs     06/29/20 1950   AST 26   ALT 14   BILITOT 0.8   ALKPHOS 161*       CULTURES  None      RADIOLOGY  FLUORO FOR SURGICAL PROCEDURES   Final Result   Intraprocedural fluoroscopic spot images as above. See separate procedure   report for more information. CT HIP RIGHT WO CONTRAST   Final Result   1. Acute mildly impacted and rotated right femoral neck fracture. Normal   right hip alignment. 2. Abdominal and pelvic lymphadenopathy consistent with history of lymphoma. Images of a prior 2017 PET/CT could not be obtained at this time for   comparison. If they can be loaded into the system a comparison can be made   for interval change. Prior to signing the report to the 08/15/2017 PET/CT   became available for comparison. There is progressive lymphadenopathy   consistent with progressive lymphoma. CT Head WO Contrast   Final Result   Right frontal scalp injury without underlying fracture or acute intracranial   CT abnormality. XR TIBIA FIBULA RIGHT (2 VIEWS)   Final Result   No acute bony abnormality. XR HIP 2-3 VW W PELVIS RIGHT   Final Result   Subcapital right femoral neck fracture.              Assessment/Plan:  Rt subcapital femoral neck  fracture   - s/p mechanical fall at home   - mildly impacted and rotated   - NWB, PRN pain control - reports intolerance to several meds  - Orthopedics consult: s/p percutaneous pinning of R femoral neck on 6/30 with Dr. Sahu Lat   - PT/OT   - H/H remaining stable, continue to monitor     Hypokalemia--resolved   - mild, 3.3  - sliding scale replacement    TCP  - baseline ~120  - PLT on admission 119-->106  - Chronic, stable     Hx of Follicular Lymphoma   - CT of R hip shows progressive lymphadenopathy concerning for progressive lymphoma   - Follows with Dr. Brodie Perez, had recent OP visit   - Has completed chemo in the past   - Will need OP follow up regarding new changes on CT-->discused with patient and , she did not want hem/onc consult during admission and would like to see her oncologist OP, reports no acute sx     HTN  - BP is uncontrolled - reports intolerance to several meds and BP is always high in 200s'  - Continue home BB,  cozaar   - can add nitrates as she did not try them  - refuses cardura  - Improving     GERD  - Continue PPI     Paroxysmal Atrial fibrillation   - not on AC sec to GI bleed   - Continue BB, flecainide

## 2020-07-01 NOTE — PROGRESS NOTES
Shift assessment, completed, see flow sheet. Pt is alert and oriented x 3. Following commands. HR 68, /78, SpO2 94%. Respirations are easy, even, and unlabored. Bilateral lung sounds clear. LCW PAC, WNL with  ml/hr infusing. Cristina in place and patent with STAT lock. Call light within reach. Bed in lowest position. Bed alarm on.  Will continue to monitor

## 2020-07-01 NOTE — PROGRESS NOTES
Shift report received from Jeanine Cox, [facility].

## 2020-07-01 NOTE — CARE COORDINATION
INTERDISCIPLINARY PLAN OF CARE CONFERENCE    Date/Time: 7/1/2020 1:58 PM  Completed by: Kumar Schuler, Case Management      Patient Name:  Edith Ross  YOB: 1945  Admitting Diagnosis: Closed right hip fracture, initial encounter [facility]) Kaleigh Horn     Admit Date/Time:  6/29/2020  6:21 PM    Chart reviewed. Interdisciplinary team met to discuss patient progress and discharge plans. Disciplines included Case Management, Nursing, and Dietitian. Current Status:stable    PT/OT recommendation:SNF    Anticipated Discharge Date: tbd  Expected D/C Disposition:  Skilled nursing facility  Confirmed plan with patient and/or family Yes  Discharge Plan Comments: Reviewed chart. Plan continues [facility] for [employer], pre-cert started today with [facility] and not back yet. [facility] following    12 family now requesting [facility] for STR. Referral called to [facility] can accept the pt at d/c,+bed. [facility] and will need to switch facilities in the AM. Nadine with [facility] aware of change in facilities. Follow. The Plan for Transition of Care is related to the following treatment goals: STR    The Patient and/or patient representative  was provided with a choice of provider and agrees   with the discharge plan.  [x] Yes [] No      Home O2 in place on admit: to STR  Pt informed of need to bring portable home O2 tank on day of discharge for nursing to connect prior to leaving:  Not Indicated  Verbalized agreement/Understanding:  Not Indicated

## 2020-07-01 NOTE — FLOWSHEET NOTE
07/01/20 1117   Vitals   BP (!) 122/59     Pt more alert, states she is tired. Opening eyes spontaneous, answering questions and more interactive. LR bolus finished    Updated DAYNE Perry and Dr. Jason Smiley.  Stated will DC nitro paste

## 2020-07-01 NOTE — ANESTHESIA PROCEDURE NOTES
Peripheral Block    Patient location during procedure: pre-op  Staffing  Anesthesiologist: Shaye Savage MD  Performed: anesthesiologist   Preanesthetic Checklist  Completed: patient identified, site marked, surgical consent, pre-op evaluation, timeout performed, IV checked, risks and benefits discussed, monitors and equipment checked, anesthesia consent given, oxygen available and patient being monitored  Peripheral Block  Patient position: supine  Prep: ChloraPrep  Patient monitoring: continuous pulse ox and IV access  Block type: Fascia iliaca  Laterality: right  Injection technique: single-shot  Procedures: ultrasound guided  Infiltration strength: 1 %  Dose: 3 mL  Provider prep: mask and sterile gloves  Needle  Needle type: combined needle/nerve stimulator   Needle gauge: 21 G  Needle length: 10 cm  Needle localization: ultrasound guidance  Assessment  Injection assessment: negative aspiration for heme, no paresthesia on injection and local visualized surrounding nerve on ultrasound  Paresthesia pain: none  Slow fractionated injection: yes  Hemodynamics: stable  Additional Notes  Immediately prior to procedure a \"time out\" was called to verify the correct patient, allergies, laterality, procedure and equipment. Time out performed with  RN    Local Anesthetic: 0.125 %  Bupivacaine  Plus epi 1 to 400K Amount: 60 ml  in 5 ml increments after negative aspiration each time.       Reason for block: post-op pain management and at surgeon's request

## 2020-07-01 NOTE — PROGRESS NOTES
500 ml bolus of LR started for hypotension 84/45, [place] OSCAR at bedside.  Nitro paste removed from pt chest. Pt remains lethargic, will respond to voice

## 2020-07-01 NOTE — PROGRESS NOTES
Inpatient Physical Therapy Evaluation and Treatment    Unit: [address]  Date:  7/1/2020  Patient Name:    Yang Gupta  Admitting diagnosis:  Closed right hip fracture, initial encounter [facility]) Anny Civil  Admit Date:  6/29/2020  Precautions/Restrictions/WB Status/ Lines/ Wounds/ Oxygen: Fall risk, Bed/chair alarm, telemetry, Lines -IV, purwick and WB Restrictions ( TTWB on the right )   TTWB on Rt LE      Right frontal scalp injury without underlying fracture or acute intracranial   CT abnormality       After asking pt information about home and previous status the pt became lethargic and BP was taken and was 89/49, 02 94% on room air . Nurse notified of pts sudden lethargy and BP. The therapist did not attempt sitting pt up on side of bed and just pulled pt to the head of the bed. Treatment Time: 8773 - 9969  Treatment Number:  1   Timed Code Treatment Minutes: 27 minutes + Eval  Total Treatment Minutes:  37 minutes    Patient Goals for Therapy: \" To go to rehab \"          Discharge Recommendations: SNF  DME needs for discharge: defer to facility       Therapy recommendation for EMS Transport: requires transport by cot due to difficulty in transfer and hypotensive response to change in position. Therapy recommendations for staff:   Assist of 2 with for bed mobility    History of Present Illness: per H&P    75 y. o. female presents to the ED with right hip pain and headache after a fall.  The patient lost her balance today fell hitting her head and her right hip.  She has pain in her right hip, right leg, and right forehead.  She did not lose consciousness, has had no nausea or vomiting.  She was unable to get up off the ground and came in via ambulance. 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 Home Health S4 Level Recommendation:  NA  AM-PAC Mobility Score    AM-PAC Inpatient Mobility Raw Score : 9     Preadmission Environment    Pt.  Lives Supine to Sit:    Not Tested  Sit to Supine:   Not Tested  Rolling:   Not Tested  Scooting in sitting: Not Tested  Scooting in supine:  Max A  and 2 persons    Transfer Training  Due to hypotensive response to elevating HOB and sudden lethargy and low arousal in response to movement. Unsafe to attempt. Sit to stand:   Not Tested  Stand to sit:   Not Tested  Bed to Chair:   Not Tested with use of N/A    Gait gait deferred due to hypotensive response to movement; pt ambulated 0 ft. Stair Training deferred, pt unsafe/ not appropriate to complete stairs at this time    Activity Tolerance   Pt completed therapy session with Pain noted with movement with RLE and low arousal status with HOB elevated position with hypotension  Spo2 = 94%%  HR = 64 BPM  BP = with HOB at 30 degrees 85/51 with supine with bed flat position 90/45. RN notified RN in the room for further follow up. Positioning Needs   Pt in bed, RN in the room, positioned in proper neutral alignment and pressure relief provided. Call light provided and all needs within reach    Exercises Initiated  Zackary deferred secondary to hypotension and low arousal status  NA    Other  None. Patient/Family Education   Pt educated on role of inpatient PT, POC, importance of continued activity, DC recommendations, safety awareness, transfer techniques, pacing activity and calling for assist with mobility. Assessment  Pt seen for Physical Therapy evaluation in acute care setting. Patient could not tolerate bed mobility functional transfers and ambulation today due to hypotensive response and low arousal status. Patient will be assessed for functional mobility tolerance once hemodynamically stable. RN was in the room for further assessment. Pt demonstrated decreased Activity tolerance, Balance, ROM, Safety and Strength as well as decreased independence with Ambulation, Bed Mobility  and Transfers.      Recommending SNF upon discharge as patient functioning well below baseline, demonstrates good rehab potential and unable to return home due to limited or no family support, inability to negotiate stairs to enter home/bedroom/bathroom, burden of care beyond caregiver ability, home environment not conducive to patient recovery and limited safety awareness. Goals : To be met in 3 visits:  1). Independent with LE Ex x 10 reps    To be met in 6 visits:  1). Supine to/from sit: Mod A   2). Sit to/from stand: Max A   3). Bed to chair: Max A   4). Gait: Ambulate 10 ft.   with  Max A  and use of LRAD (least restrictive assistive device)  5). Tolerate B LE exercises 3 sets of 10-15 reps    Rehabilitation Potential: Fair  Strengths for achieving goals include:   Pt motivated, PLOF and Pt cooperative   Barriers to achieving goals include:    Complexity of condition and Pain    Plan    To be seen 3-5 x / week  while in acute care setting for therapeutic exercises, bed mobility, transfers, progressive gait training, balance training, and family/patient education. Signature: Chidi Art MS PT, # [id number]      If patient discharges from this facility prior to next visit, this note will serve as the Discharge Summary.

## 2020-07-01 NOTE — PLAN OF CARE
Problem: Falls - Risk of:  Goal: Will remain free from falls  Description: Will remain free from falls  Outcome: Ongoing   Fall precautions in place, bed alarm on, nonskid foot wear applied, bed in lowest position, and call light within reach. Will continue to monitor. Problem: PAIN  Goal: Patient's pain/discomfort is manageable  7/1/2020 0056 by Miko Leon RN  Outcome: Ongoing  7/1/2020 0055 by Miko Leon RN  Outcome: Ongoing   Pt is sleeping post surgery, no C/O pain. Call light in reach. Problem: SKIN INTEGRITY  Goal: Skin integrity is maintained or improved  7/1/2020 0056 by Miko Leon RN  Outcome: Ongoing  7/1/2020 0055 by Miko Leon RN  Outcome: Ongoing   Dressing on right hip, dry and intact. Ice bag in place. Problem:  Activity:  Goal: Ability to ambulate will improve  Description: Ability to ambulate will improve  7/1/2020 0056 by Miko Leon RN  Outcome: Ongoing  7/1/2020 0055 by Miko Leon RN  Outcome: Ongoing   TTWB as tolerated per MD.

## 2020-07-01 NOTE — PROGRESS NOTES
Inpatient Occupational Therapy  Evaluation and Treatment    Unit: 2 [address]  Date:  7/1/2020  Patient Name:    Yung Colbert  Admitting diagnosis:  Closed right hip fracture, initial encounter [facility]) Maria Del Rosario Pinto Date:  6/29/2020  Precautions/Restrictions/WB Status/ Lines/ Wounds/ Oxygen: Fall risk, Bed/chair alarm, Lines -IV and WB Restrictions ( TTWB on the right )     TTWB on Rt LE     Right frontal scalp injury without underlying fracture or acute intracranial   CT abnormality      After asking pt information about home and previous status the pt became lethargic and BP was taken and was 89/49, 02 94% on room air . Nurse notified of pts sudden lethargy and BP. The therapist did not attempt sitting pt up on side of bed and just pulled pt to the head of the bed. Treatment Time:  [id number]  Treatment Number: 1   Timed code treatment minutes 22 minutes   Total Treatment minutes:   32  minutes    Patient Goals for Therapy:  \" go to rehab  \"      Discharge Recommendations: SNF  DME needs for discharge: defer to facility       Therapy recommendations for staff:   Assist of 2  For bed mobility  History of Present Illness: per H&P    66 y.o. female presents to the ED with right hip pain and headache after a fall. The patient lost her balance today fell hitting her head and her right hip. She has pain in her right hip, right leg, and right forehead. She did not lose consciousness, has had no nausea or vomiting. 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 Home Health S4 Level Recommendation:  NA  AM-PAC Score: 14    Preadmission Environment    Pt.  Lives with family (spouse and granddaughter) granddaughter works full time  Home environment:    one story home  Steps to enter first floor: 2 steps to enter and no handrail  Steps to second floor: N/A  Bathroom: tub/shower unit,

## 2020-07-02 LAB
ANION GAP SERPL CALCULATED.3IONS-SCNC: 14 MMOL/L (ref 3–16)
BASOPHILS ABSOLUTE: 0 K/UL (ref 0–0.2)
BASOPHILS RELATIVE PERCENT: 0.8 %
BUN BLDV-MCNC: 13 MG/DL (ref 7–20)
CALCIUM SERPL-MCNC: 8.6 MG/DL (ref 8.3–10.6)
CHLORIDE BLD-SCNC: 96 MMOL/L (ref 99–110)
CO2: 25 MMOL/L (ref 21–32)
CREAT SERPL-MCNC: 1.1 MG/DL (ref 0.6–1.2)
EOSINOPHILS ABSOLUTE: 0.3 K/UL (ref 0–0.6)
EOSINOPHILS RELATIVE PERCENT: 6.5 %
GFR AFRICAN AMERICAN: 58
GFR NON-AFRICAN AMERICAN: 48
GLUCOSE BLD-MCNC: 94 MG/DL (ref 70–99)
HCT VFR BLD CALC: 28 % (ref 36–48)
HEMOGLOBIN: 9.6 G/DL (ref 12–16)
LYMPHOCYTES ABSOLUTE: 0.7 K/UL (ref 1–5.1)
LYMPHOCYTES RELATIVE PERCENT: 14.6 %
MAGNESIUM: 1.5 MG/DL (ref 1.8–2.4)
MCH RBC QN AUTO: 32.3 PG (ref 26–34)
MCHC RBC AUTO-ENTMCNC: 34.5 G/DL (ref 31–36)
MCV RBC AUTO: 93.7 FL (ref 80–100)
MONOCYTES ABSOLUTE: 0.6 K/UL (ref 0–1.3)
MONOCYTES RELATIVE PERCENT: 12.1 %
NEUTROPHILS ABSOLUTE: 3.3 K/UL (ref 1.7–7.7)
NEUTROPHILS RELATIVE PERCENT: 66 %
PDW BLD-RTO: 13.6 % (ref 12.4–15.4)
PLATELET # BLD: 82 K/UL (ref 135–450)
PLATELET SLIDE REVIEW: ABNORMAL
PMV BLD AUTO: 8.9 FL (ref 5–10.5)
POTASSIUM REFLEX MAGNESIUM: 3.4 MMOL/L (ref 3.5–5.1)
RBC # BLD: 2.98 M/UL (ref 4–5.2)
SLIDE REVIEW: ABNORMAL
SODIUM BLD-SCNC: 135 MMOL/L (ref 136–145)
WBC # BLD: 5 K/UL (ref 4–11)

## 2020-07-02 PROCEDURE — 94761 N-INVAS EAR/PLS OXIMETRY MLT: CPT

## 2020-07-02 PROCEDURE — 80048 BASIC METABOLIC PNL TOTAL CA: CPT

## 2020-07-02 PROCEDURE — 99232 SBSQ HOSP IP/OBS MODERATE 35: CPT | Performed by: INTERNAL MEDICINE

## 2020-07-02 PROCEDURE — 94150 VITAL CAPACITY TEST: CPT

## 2020-07-02 PROCEDURE — 2580000003 HC RX 258: Performed by: ORTHOPAEDIC SURGERY

## 2020-07-02 PROCEDURE — 1200000000 HC SEMI PRIVATE

## 2020-07-02 PROCEDURE — 2700000000 HC OXYGEN THERAPY PER DAY

## 2020-07-02 PROCEDURE — 6370000000 HC RX 637 (ALT 250 FOR IP): Performed by: ORTHOPAEDIC SURGERY

## 2020-07-02 PROCEDURE — 97112 NEUROMUSCULAR REEDUCATION: CPT

## 2020-07-02 PROCEDURE — 97110 THERAPEUTIC EXERCISES: CPT

## 2020-07-02 PROCEDURE — 85025 COMPLETE CBC W/AUTO DIFF WBC: CPT

## 2020-07-02 PROCEDURE — 97530 THERAPEUTIC ACTIVITIES: CPT

## 2020-07-02 PROCEDURE — 6370000000 HC RX 637 (ALT 250 FOR IP): Performed by: HOSPITALIST

## 2020-07-02 PROCEDURE — 97535 SELF CARE MNGMENT TRAINING: CPT

## 2020-07-02 PROCEDURE — 6360000002 HC RX W HCPCS: Performed by: ORTHOPAEDIC SURGERY

## 2020-07-02 PROCEDURE — 6360000002 HC RX W HCPCS: Performed by: INTERNAL MEDICINE

## 2020-07-02 PROCEDURE — 83735 ASSAY OF MAGNESIUM: CPT

## 2020-07-02 RX ORDER — LOSARTAN POTASSIUM 25 MG/1
50 TABLET ORAL DAILY
Status: DISCONTINUED | OUTPATIENT
Start: 2020-07-03 | End: 2020-07-03

## 2020-07-02 RX ORDER — MAGNESIUM SULFATE IN WATER 40 MG/ML
2 INJECTION, SOLUTION INTRAVENOUS ONCE
Status: COMPLETED | OUTPATIENT
Start: 2020-07-02 | End: 2020-07-02

## 2020-07-02 RX ADMIN — AZITHROMYCIN MONOHYDRATE 250 MG: 500 INJECTION, POWDER, LYOPHILIZED, FOR SOLUTION INTRAVENOUS at 18:32

## 2020-07-02 RX ADMIN — Medication 10 ML: at 22:27

## 2020-07-02 RX ADMIN — FLECAINIDE ACETATE 50 MG: 100 TABLET ORAL at 22:26

## 2020-07-02 RX ADMIN — TIMOLOL MALEATE 1 DROP: 5 SOLUTION/ DROPS OPHTHALMIC at 22:27

## 2020-07-02 RX ADMIN — DOXAZOSIN 4 MG: 2 TABLET ORAL at 08:29

## 2020-07-02 RX ADMIN — LOSARTAN POTASSIUM 100 MG: 100 TABLET, FILM COATED ORAL at 08:30

## 2020-07-02 RX ADMIN — SENNOSIDES AND DOCUSATE SODIUM 1 TABLET: 8.6; 5 TABLET ORAL at 22:27

## 2020-07-02 RX ADMIN — PANTOPRAZOLE SODIUM 40 MG: 40 TABLET, DELAYED RELEASE ORAL at 05:36

## 2020-07-02 RX ADMIN — Medication 10 ML: at 08:30

## 2020-07-02 RX ADMIN — ACETAMINOPHEN 650 MG: 325 TABLET ORAL at 08:34

## 2020-07-02 RX ADMIN — SODIUM CHLORIDE, POTASSIUM CHLORIDE, SODIUM LACTATE AND CALCIUM CHLORIDE: 600; 310; 30; 20 INJECTION, SOLUTION INTRAVENOUS at 08:35

## 2020-07-02 RX ADMIN — TIMOLOL MALEATE 1 DROP: 5 SOLUTION/ DROPS OPHTHALMIC at 08:31

## 2020-07-02 RX ADMIN — ENOXAPARIN SODIUM 40 MG: 40 INJECTION SUBCUTANEOUS at 09:37

## 2020-07-02 RX ADMIN — FLECAINIDE ACETATE 50 MG: 100 TABLET ORAL at 08:30

## 2020-07-02 RX ADMIN — SENNOSIDES AND DOCUSATE SODIUM 1 TABLET: 8.6; 5 TABLET ORAL at 08:30

## 2020-07-02 RX ADMIN — METOPROLOL SUCCINATE 25 MG: 25 TABLET, EXTENDED RELEASE ORAL at 08:30

## 2020-07-02 RX ADMIN — MAGNESIUM SULFATE IN WATER 2 G: 40 INJECTION, SOLUTION INTRAVENOUS at 14:39

## 2020-07-02 ASSESSMENT — PAIN DESCRIPTION - DESCRIPTORS: DESCRIPTORS: ACHING

## 2020-07-02 ASSESSMENT — PAIN SCALES - GENERAL
PAINLEVEL_OUTOF10: 3
PAINLEVEL_OUTOF10: 3
PAINLEVEL_OUTOF10: 4

## 2020-07-02 ASSESSMENT — PAIN DESCRIPTION - PAIN TYPE: TYPE: SURGICAL PAIN

## 2020-07-02 ASSESSMENT — PAIN DESCRIPTION - ONSET: ONSET: ON-GOING

## 2020-07-02 ASSESSMENT — PAIN DESCRIPTION - ORIENTATION: ORIENTATION: RIGHT

## 2020-07-02 ASSESSMENT — PAIN DESCRIPTION - FREQUENCY: FREQUENCY: INTERMITTENT

## 2020-07-02 ASSESSMENT — PAIN DESCRIPTION - LOCATION: LOCATION: HIP

## 2020-07-02 NOTE — PLAN OF CARE
Problem: Falls - Risk of:  Goal: Will remain free from falls  Description: Will remain free from falls  7/2/2020 1028 by Yeimi Miller RN  Outcome: Ongoing  7/1/2020 2202 by Krystin Duarte RN  Outcome: Ongoing   Fall precautions in place, bed alarm on, nonskid foot wear applied, bed in lowest position, and call light within reach. Will continue to monitor. Problem: PAIN  Goal: Patient's pain/discomfort is manageable  7/2/2020 1028 by Yeimi Miller RN  Outcome: Ongoing  7/1/2020 2202 by Krystin Duarte RN  Outcome: Ongoing   Pt is on Tylenol as needed for pain control. Call light in reach. Pain control satisfactory. Problem: SKIN INTEGRITY  Goal: Skin integrity is maintained or improved  7/2/2020 1028 by Yeimi Miller RN  Outcome: Ongoing  7/1/2020 2202 by Krystin Duarte RN  Outcome: Ongoing   Turning her Q 2 H, keeping her clean and dry. Using pure wick. Skin intact. Problem: Activity:  Goal: Ability to ambulate will improve  Description: Ability to ambulate will improve  Outcome: Ongoing   PT/OT worked with the pt pt today. Pt sitting up on the chair, call light in reach.

## 2020-07-02 NOTE — PROGRESS NOTES
Progress Note    Admit Date:  6/29/2020    Admitted for R hip fracture , S.p IM nailing. POD 2  No fevers  BP high      Subjective:  Ms. Clement Amezcua is seen up in chair, doing today but still sleepy. Pain controlled   at bedside     Objective:   Vitals:    07/02/20 1226   BP: 103/65   Pulse: 62   Resp: 18   Temp: 97.3 °F (36.3 °C)   SpO2: 97%       Intake/Output Summary (Last 24 hours) at 7/2/2020 1323  Last data filed at 7/2/2020 1223  Gross per 24 hour   Intake 2198 ml   Output 1950 ml   Net 248 ml       Physical Exam:      General: elderly female  Drowsy but arousable  Appears to be not in any distress  Mucous Membranes:  Pink , anicteric  Neck: No JVD, no carotid bruit, no thyromegaly  Chest:  Clear to auscultation bilaterally, no added sounds  Left PAC  Cardiovascular:  RRR S1S2 heard, no murmurs or gallops  Abdomen:  Soft, undistended, non tender, no organomegaly, BS present  Extremities: right hip with pain and expected edema, surgical dressing dry  . No edema or cyanosis.  Distal pulses well felt  Neurological : grossly normal with mild drowsiness            Scheduled Meds:   [START ON 7/3/2020] losartan  50 mg Oral Daily    flecainide  50 mg Oral 2 times per day    sennosides-docusate sodium  1 tablet Oral BID    azithromycin (ZITHROMAX) 500 mg IVPB  250 mg Intravenous Q24H    metoprolol succinate  25 mg Oral Daily    pantoprazole  40 mg Oral QAM AC    timolol  1 drop Both Eyes BID    sodium chloride flush  10 mL Intravenous 2 times per day    enoxaparin  40 mg Subcutaneous Daily       Continuous Infusions:      PRN Meds:  traMADol **OR** traMADol, magnesium hydroxide, ondansetron, sodium chloride flush, acetaminophen **OR** acetaminophen, polyethylene glycol, [DISCONTINUED] promethazine **OR** ondansetron, potassium chloride      Data:  CBC:   Recent Labs     06/30/20  0211 07/01/20  0735 07/02/20  0530   WBC 6.8 7.5 5.0   HGB 13.2 11.4* 9.6*   HCT 38.9 33.4* 28.0*   MCV 93.7 92.2 93.7   * lymphadenopathy concerning for progressive lymphoma   - Follows with Dr. Fabby Price, had recent OP visit   - Has completed chemo in the past   - Will need OP follow up regarding new changes on CT-->discused with patient and , she did not want hem/onc consult during admission and would like to see her oncologist OP, reports no acute sx     HTN  - BP is uncontrolled - reports intolerance to several meds and BP is always high in 200s'  - Continue home BB,  cozaar - significant drop post op , now improved  - resume meds as tolerated    GERD  - Continue PPI     Paroxysmal Atrial fibrillation   - not on AC sec to GI bleed   - Continue BB, flecainide    - rate is controlled     DVT Prophylaxis: Lovenox  Diet: DIET GENERAL;  Code Status: Full Code         Eliana Payne MD 7/2/2020 1:23 PM

## 2020-07-02 NOTE — PROGRESS NOTES
Sleeping, telemetry showing NSR; HR 80. Call light in reach; O2 on at 2 L pnc. Will continue to monitor.

## 2020-07-02 NOTE — PROGRESS NOTES
Department of Orthopedic Surgery  Nurse Practitioner   Progress Note    Subjective:     Post-Operative Day: 2 Status Post right hip perc pin  Systemic or Specific Complaints: No complaints. Patient resting in bed, sleepy but answering questions. No family at bedside. Denies dizziness, SOB, chest pain. Objective:     Patient Vitals for the past 24 hrs:   BP Temp Temp src Pulse Resp SpO2   07/02/20 0828 (!) 209/73 -- -- 79 -- --   07/02/20 0822 (!) 221/88 99.2 °F (37.3 °C) Oral 84 18 95 %   07/02/20 0315 (!) 159/78 98.4 °F (36.9 °C) Oral 80 18 92 %   07/01/20 2018 128/69 98.3 °F (36.8 °C) Oral 73 18 94 %   07/01/20 1256 125/67 -- -- 66 -- --   07/01/20 1117 (!) 122/59 -- -- -- -- --   07/01/20 1038 (!) 91/51 -- -- -- -- --   07/01/20 1015 (!) 84/45 -- -- -- -- (!) 88 %   07/01/20 1010 (!) 90/45 -- -- -- -- --   07/01/20 1005 (!) 85/43 -- -- -- -- --   07/01/20 1003 (!) 88/52 -- -- -- -- --       General: alert, appears stated age, cooperative and no distress   Wound: Wound clean and dry no evidence of infection. Ecchymosis to lateral and posterior thigh. Motion: Painful range of Motion   DVT Exam: No evidence of DVT seen on physical exam.       NVI in lower extremity. Thigh swollen but soft. Moving foot and ankle. Data Review  CBC:   Lab Results   Component Value Date    WBC 5.0 07/02/2020    RBC 2.98 07/02/2020    HGB 9.6 07/02/2020    HCT 28.0 07/02/2020    PLT 82 07/02/2020       Assessment:     Status Post right hip perc pin. Doing well postoperatively. Plan:      1: Continues current post-op course : Continue current plan of care per medicine. Post-op labs reviewed and stable. BP now elevated, hospitalist aware. 2:  Continue Deep venous thrombosis prophylaxis - Lovenox  3:  Continue physical therapy, OT, TTWB  4:  Continue Pain Control  5:  Plan is for [facility] at discharge. Patient's  is blind and unable to care for her at home. CM following.  Scripts in script folder, discharge instructions completed. Discharge pending precert, medical clearance. Please contact us for any ortho concerns or needs and follow up with Dr. Carlene Whelan in 2 weeks.     Norman Cage, CNP

## 2020-07-02 NOTE — PROGRESS NOTES
Family called requesting that they perfer patient go [facility] upon discharge. Apparently this is in progress per Case management.

## 2020-07-02 NOTE — PROGRESS NOTES
Inpatient Physical Therapy Daily Treatment Note    Unit: 2 [place]  Date:  7/2/2020  Patient Name:    Jack Rosenberg  Admitting diagnosis:  Closed right hip fracture, initial encounter [facility]) aYnna Medina  Admit Date:  6/29/2020  Precautions/Restrictions:  Fall risk, Bed/chair alarm, Lines -IV, Supplemental O2 (2) and Purewick catheter, Telemetry and WB Restrictions (TTWB on the RLE)  TTWB on Rt LE   Right frontal scalp injury without underlying fracture or acute intracranial   CT abnormality       Discharge Recommendations: SNF  DME needs for discharge: defer to facility       Therapy recommendation for EMS Transport: requires transport by cot due to extensive physical assistance needed for transfers. Therapy recommendations for staff:   Assist of 2 with use of MOI STEDY for all transfers to/from chair    History of Present Illness: per H&P    75 y. o. female presents to the ED with right hip pain and headache after a fall.  The patient lost her balance today fell hitting her head and her right hip.  She has pain in her right hip, right leg, and right forehead.  She did not lose consciousness, has had no nausea or vomiting.  She was unable to get up off the ground and came in via ambulance. 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     Home Health S4 Level Recommendation: NA  AM-PAC Mobility Score   AM-PAC Inpatient Mobility Raw Score : 9     Treatment Time: 9833 - 1013  Treatment number: 2  Timed Code Treatment Minutes: 45 minutes  Total Treatment Minutes:  45  minutes    Cognition    A&O x4   Able to follow 2 step commands   Kept her eyes closed initially for the partial session with improvement in alertness after sitting at the EOB. Subjective  Patient lying supine in bed with spouse present   Pt agreeable to this PT tx.      Pain   Yes  Location: R hip  Rating:    moderate/10  Pain Medicine Status: RN notified     Bed Mobility   Supine to Sit: Max A  and 2 persons  Sit to Supine:   Max A  and 2 persons  Rolling:   Not Tested  Scooting: Max A  and 2 persons    Transfer Training     Sit to stand: Max A  and 2 persons  Stand to sit: Max A  and 2 persons  Bed to Chair:   Max A  and 2 persons with use of MOI STEDY    Gait Training gait deferred due to difficulty with transfers; pt ambulated 0 ft. Stair Training deferred, pt unsafe/not appropriate to complete stairs at this time    Therapeutic Exercise all completed bilaterally unless indicated  AAROM for heel slides, ankle pumps for 10 reps x 2 sets. Balance  Sitting:  Fair ; Min A   Comments: 10 min    Standing: Fair -; Min A   Comments: 1 min x 3 reps    Patient Education      Role of PT, POC, Discharge recommendations, DC recommendations, safety awareness, transfer techniques, pursed lip breathing, energy conservation, pacing activity and calling for assist with mobility. Positioning Needs       Pt reclined in chair, alarm set, positioned in proper neutral alignment and pressure relief provided. Call light provided and all needs within reach    ROM Measurements N/A  Knee Flexion:  Knee Extension:     Activity Tolerance   Pt completed therapy session with Pain noted with activity  SOB noted with activity  Spo2 = 94%  HR = 71 - 77 BPM  BP = 171/79 at rest, with standing and bed to chair transfers 147/71. Other  N/A    Assessment :  Patient tolerated the session well today with no hypotension. Patient was 2 person assist for bed mobility and tolerated sit to stand 3 times with better compliance with weight bearing. Recommending SNF upon discharge as patient functioning well below baseline, demonstrates good rehab potential and unable to return home due to limited or no family support, inability to negotiate stairs to enter home/bedroom/bathroom, burden of care beyond caregiver ability, home environment not conducive to patient recovery and limited safety awareness.     Goals (all

## 2020-07-03 LAB
ANION GAP SERPL CALCULATED.3IONS-SCNC: 12 MMOL/L (ref 3–16)
BASOPHILS ABSOLUTE: 0 K/UL (ref 0–0.2)
BASOPHILS RELATIVE PERCENT: 0.9 %
BUN BLDV-MCNC: 17 MG/DL (ref 7–20)
CALCIUM SERPL-MCNC: 8.5 MG/DL (ref 8.3–10.6)
CHLORIDE BLD-SCNC: 94 MMOL/L (ref 99–110)
CO2: 27 MMOL/L (ref 21–32)
CREAT SERPL-MCNC: 1.8 MG/DL (ref 0.6–1.2)
EOSINOPHILS ABSOLUTE: 0.3 K/UL (ref 0–0.6)
EOSINOPHILS RELATIVE PERCENT: 7.3 %
GFR AFRICAN AMERICAN: 33
GFR NON-AFRICAN AMERICAN: 27
GLUCOSE BLD-MCNC: 108 MG/DL (ref 70–99)
HCT VFR BLD CALC: 26.8 % (ref 36–48)
HEMOGLOBIN: 9.3 G/DL (ref 12–16)
LYMPHOCYTES ABSOLUTE: 0.7 K/UL (ref 1–5.1)
LYMPHOCYTES RELATIVE PERCENT: 17 %
MAGNESIUM: 2.4 MG/DL (ref 1.8–2.4)
MCH RBC QN AUTO: 32.3 PG (ref 26–34)
MCHC RBC AUTO-ENTMCNC: 34.6 G/DL (ref 31–36)
MCV RBC AUTO: 93.3 FL (ref 80–100)
MONOCYTES ABSOLUTE: 0.5 K/UL (ref 0–1.3)
MONOCYTES RELATIVE PERCENT: 12.3 %
NEUTROPHILS ABSOLUTE: 2.4 K/UL (ref 1.7–7.7)
NEUTROPHILS RELATIVE PERCENT: 62.5 %
PDW BLD-RTO: 13.6 % (ref 12.4–15.4)
PLATELET # BLD: 91 K/UL (ref 135–450)
PMV BLD AUTO: 9.2 FL (ref 5–10.5)
POTASSIUM REFLEX MAGNESIUM: 3.2 MMOL/L (ref 3.5–5.1)
RBC # BLD: 2.88 M/UL (ref 4–5.2)
SODIUM BLD-SCNC: 133 MMOL/L (ref 136–145)
WBC # BLD: 3.8 K/UL (ref 4–11)

## 2020-07-03 PROCEDURE — 83735 ASSAY OF MAGNESIUM: CPT

## 2020-07-03 PROCEDURE — 6370000000 HC RX 637 (ALT 250 FOR IP): Performed by: HOSPITALIST

## 2020-07-03 PROCEDURE — 6360000002 HC RX W HCPCS: Performed by: ORTHOPAEDIC SURGERY

## 2020-07-03 PROCEDURE — 6370000000 HC RX 637 (ALT 250 FOR IP): Performed by: ORTHOPAEDIC SURGERY

## 2020-07-03 PROCEDURE — 1200000000 HC SEMI PRIVATE

## 2020-07-03 PROCEDURE — 99232 SBSQ HOSP IP/OBS MODERATE 35: CPT | Performed by: INTERNAL MEDICINE

## 2020-07-03 PROCEDURE — 6370000000 HC RX 637 (ALT 250 FOR IP): Performed by: PHYSICIAN ASSISTANT

## 2020-07-03 PROCEDURE — 97530 THERAPEUTIC ACTIVITIES: CPT

## 2020-07-03 PROCEDURE — 6370000000 HC RX 637 (ALT 250 FOR IP): Performed by: INTERNAL MEDICINE

## 2020-07-03 PROCEDURE — 2580000003 HC RX 258: Performed by: INTERNAL MEDICINE

## 2020-07-03 PROCEDURE — 2580000003 HC RX 258: Performed by: ORTHOPAEDIC SURGERY

## 2020-07-03 PROCEDURE — 85025 COMPLETE CBC W/AUTO DIFF WBC: CPT

## 2020-07-03 PROCEDURE — 97110 THERAPEUTIC EXERCISES: CPT

## 2020-07-03 PROCEDURE — 80048 BASIC METABOLIC PNL TOTAL CA: CPT

## 2020-07-03 RX ORDER — DOXAZOSIN 2 MG/1
2 TABLET ORAL DAILY
Status: DISCONTINUED | OUTPATIENT
Start: 2020-07-03 | End: 2020-07-06

## 2020-07-03 RX ORDER — POTASSIUM CHLORIDE 20 MEQ/1
40 TABLET, EXTENDED RELEASE ORAL ONCE
Status: COMPLETED | OUTPATIENT
Start: 2020-07-03 | End: 2020-07-03

## 2020-07-03 RX ORDER — SODIUM CHLORIDE 9 MG/ML
INJECTION, SOLUTION INTRAVENOUS CONTINUOUS
Status: DISCONTINUED | OUTPATIENT
Start: 2020-07-03 | End: 2020-07-03

## 2020-07-03 RX ORDER — AZITHROMYCIN 250 MG/1
250 TABLET, FILM COATED ORAL DAILY
Status: COMPLETED | OUTPATIENT
Start: 2020-07-03 | End: 2020-07-03

## 2020-07-03 RX ORDER — SODIUM CHLORIDE 9 MG/ML
INJECTION, SOLUTION INTRAVENOUS CONTINUOUS
Status: DISPENSED | OUTPATIENT
Start: 2020-07-03 | End: 2020-07-04

## 2020-07-03 RX ADMIN — ACETAMINOPHEN 650 MG: 325 TABLET ORAL at 19:50

## 2020-07-03 RX ADMIN — FLECAINIDE ACETATE 50 MG: 100 TABLET ORAL at 19:49

## 2020-07-03 RX ADMIN — SODIUM CHLORIDE: 9 INJECTION, SOLUTION INTRAVENOUS at 19:49

## 2020-07-03 RX ADMIN — METOPROLOL SUCCINATE 25 MG: 25 TABLET, EXTENDED RELEASE ORAL at 08:48

## 2020-07-03 RX ADMIN — SODIUM CHLORIDE: 9 INJECTION, SOLUTION INTRAVENOUS at 12:28

## 2020-07-03 RX ADMIN — LOSARTAN POTASSIUM 50 MG: 25 TABLET, FILM COATED ORAL at 08:48

## 2020-07-03 RX ADMIN — ENOXAPARIN SODIUM 40 MG: 40 INJECTION SUBCUTANEOUS at 08:49

## 2020-07-03 RX ADMIN — TIMOLOL MALEATE 1 DROP: 5 SOLUTION/ DROPS OPHTHALMIC at 19:52

## 2020-07-03 RX ADMIN — Medication 10 ML: at 08:49

## 2020-07-03 RX ADMIN — AZITHROMYCIN MONOHYDRATE 250 MG: 250 TABLET ORAL at 16:46

## 2020-07-03 RX ADMIN — POTASSIUM CHLORIDE 40 MEQ: 1500 TABLET, EXTENDED RELEASE ORAL at 11:07

## 2020-07-03 RX ADMIN — SENNOSIDES AND DOCUSATE SODIUM 1 TABLET: 8.6; 5 TABLET ORAL at 08:48

## 2020-07-03 RX ADMIN — TIMOLOL MALEATE 1 DROP: 5 SOLUTION/ DROPS OPHTHALMIC at 08:49

## 2020-07-03 RX ADMIN — PANTOPRAZOLE SODIUM 40 MG: 40 TABLET, DELAYED RELEASE ORAL at 06:09

## 2020-07-03 RX ADMIN — FLECAINIDE ACETATE 50 MG: 100 TABLET ORAL at 08:48

## 2020-07-03 RX ADMIN — SENNOSIDES AND DOCUSATE SODIUM 1 TABLET: 8.6; 5 TABLET ORAL at 19:50

## 2020-07-03 ASSESSMENT — PAIN DESCRIPTION - LOCATION: LOCATION: HIP

## 2020-07-03 ASSESSMENT — PAIN DESCRIPTION - PAIN TYPE: TYPE: SURGICAL PAIN

## 2020-07-03 ASSESSMENT — PAIN DESCRIPTION - ORIENTATION: ORIENTATION: RIGHT

## 2020-07-03 ASSESSMENT — PAIN SCALES - GENERAL
PAINLEVEL_OUTOF10: 7
PAINLEVEL_OUTOF10: 0

## 2020-07-03 NOTE — PROGRESS NOTES
Occupational Therapy Daily Treatment Note    Unit: 2 [place]  Date:  7/3/2020  Patient Name:    Marycarmen Kc  Admitting diagnosis:  Closed right hip fracture, initial encounter [facility]) Yaron Ann Date:  6/29/2020  Precautions/Restrictions:  fall risk, IV, bed/chair alarm, purewick catheter, confusion and WB restrictions (TTWB RLE)         Discharge Recommendations: SNF  DME needs for discharge: defer to facility       Therapy recommendations for staff:   Assist of 2 (moderate assist) with use of MOI STEDY for all transfers to/from BSC/chair    AM-PAC Score: AM-PAC Inpatient Daily Activity Raw Score: 14  Home Health S4 Level: NA       Treatment Time:  7693-5937  Treatment number:  3   Total Treatment Time:   40 minutes    History of Present Illness: 66 y. o. female presents to the ED with right hip pain and headache after a fall.  The patient lost her balance today fell hitting her head and her right hip.  She has pain in her right hip, right leg, and right forehead.  She did not lose consciousness, has had no nausea or vomiting.  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        Subjective:  Patient in bed and agreeable to therapy. Patient stated January 23, 1945 for birthdate,  corrected after a time. \"We're in this place where they're doing testing for the coronavirus. \"  Unable to state hospital even with cues. Stated \"June 220\" for month and year but quickly reoriented to July. Struggled with understanding TTWB and appeared to somewhat understand NO weight bearing to avoid gray areas.     Pain   None at rest, some RLE pain with movement but did not rate, reported \"no pain to speak of\" at end of session  Rating: NA  Location:  Pain Medicine Status: No request made      Bed Mobility:   Supine to Sit:  Mod A of 2 cues for technique  Sit to Supine:  Not Tested  Rolling: Not Tested  Scooting: Mod A of 2 scoot back in chair, MAX A to scoot out to EOB    Transfer Training: used STEDY for transfer  Sit to stand: Max A of 2 to STEDY, Max A of 2 to SW initial trial, Mod A of 2 to SW 2nd trial; max cues for hand placement   Stand to sit: Max A of 2 from STEDY and Mod A of 2 from SW standing trial  Bed to Chair:  Total A via Stedy with max ongoing cues for RLE weightbearing  Bed to [facility]:   Not Tested  Standard toilet:   Not Tested     After transfer with STEDY and struggling with maintaining TTWB, trial of SW initiated. Improved adherence to TTWB/NWB with walker but noted one episode of LLE knee buckling in stance during first trial.  Continue to recommend STEDY for staff transfers to protect from knee buckling. Recommend cues for NON WEIGHT BEARING on RLE rather than TTWB as patient appears to have better carryover with these cues. Activity Tolerance   Pt completed therapy session with Pain noted with RLE movement  Dizziness after initial Supine to Sit, unable to get BP reading but dizziness resolved. ADL Training:   Upper body dressing:  Not Tested  Upper body bathing:  Not Tested  Lower body dressing:  Not Tested  Lower body bathing:  Not Tested  Toileting:   Not Tested  Grooming/Hygiene:  Not Tested    Therapeutic Exercise:   N/A    Patient Education:   Role of OT  Recommendations for DC  Safe RW use/hand placement   RLE weightbearing restrictions    Positioning Needs:   Reclined in chair with call light and needs in reach. Alarm Set    Family Present:  Yes,  (who is legally blind)     Assessment: Patient making progress toward goals, continue as tolerated. Consider attempting pivot with walker to facilitate adherence to weightbearing restrictions. Recommend cues for NON WEIGHT BEARING on RLE rather than TTWB as patient appears to have better carryover with these cues. GOALS-continue all  To be met in 3 Visits:  1).  Bed to BSC/chair: assess when appropriate (upgraded 7/3/20 to MAX A)     To be met in 5 Visits:  1). Supine to/from Sit:             Mod A   2). Upper Body Bathing:         CGA  3). Lower Body Bathing: Mod A   4). Upper Body Dressing:       CGA  5). Lower Body Dressing: Mod A   6).  Pt to demonstrate UE exs x 15 reps with minimal cues      Plan: cont with [facility], OTR/L 2295          If patient discharges from this facility prior to next visit, this note will serve as the Discharge Summary

## 2020-07-03 NOTE — PLAN OF CARE
Problem: Falls - Risk of:  Goal: Will remain free from falls  Description: Will remain free from falls  Outcome: Ongoing     Problem: Falls - Risk of:  Goal: Absence of physical injury  Description: Absence of physical injury  Outcome: Ongoing     Problem: SAFETY  Goal: Free from accidental physical injury  Outcome: Ongoing     Problem: SAFETY  Goal: Free from intentional harm  Outcome: Ongoing     Problem: DAILY CARE  Goal: Daily care needs are met  Outcome: Ongoing     Problem: PAIN  Goal: Patient's pain/discomfort is manageable  Outcome: Ongoing

## 2020-07-03 NOTE — CARE COORDINATION
INTERDISCIPLINARY PLAN OF CARE CONFERENCE    Date/Time: 7/3/2020 9:22 AM  Completed by: Manny Tian, Case Management      Patient Name:  Evan Rodriguez  YOB: 1945  Admitting Diagnosis: Closed right hip fracture, initial encounter [facility]) Flaquito Perkins     Admit Date/Time:  6/29/2020  6:21 PM    Chart reviewed. Interdisciplinary team met to discuss patient progress and discharge plans. Disciplines included Case Management, Nursing, and Dietitian. Current Status:stable    PT/OT recommendation:SNF    Anticipated Discharge Date: tbd  Expected D/C Disposition:  STR  Confirmed plan with patient and/or family Yes  Discharge Plan Comments: Reviewed chart. Writer spoke with pt's spouse via TC to update him on POC. Pt from home with spouse and plan [facility] for STR, pre-cert to start this AM and updated PT notes are in epic. Writer left  for Bonilla [place] r/t updated PT notes. Follow. 1335 per [facility] with [facility] pre-cert is back and she can accept the pt at d/c. Perfect serve sent to . The Plan for Transition of Care is related to the following treatment goals: STR    The Patient and/or patient representative  was provided with a choice of provider and agrees   with the discharge plan.  [x] Yes [] No      Home O2 in place on admit: to STR  Pt informed of need to bring portable home O2 tank on day of discharge for nursing to connect prior to leaving:  Not Indicated  Verbalized agreement/Understanding:  Not Indicated

## 2020-07-03 NOTE — CARE COORDINATION
INTERDISCIPLINARY PLAN OF CARE CONFERENCE    Date/Time: 7/3/2020 9:22 AM  Completed by: Narinder Webber, Case Management      Patient Name:  Nitin Lemos  YOB: 1945  Admitting Diagnosis: Closed right hip fracture, initial encounter [facility]) Chi Welch     Admit Date/Time:  6/29/2020  6:21 PM    Chart reviewed. Interdisciplinary team met to discuss patient progress and discharge plans. Disciplines included Case Management, Nursing, and Dietitian. Current Status:stable    PT/OT recommendation:SNF    Anticipated Discharge Date: tbd  Expected D/C Disposition:  STR  Confirmed plan with patient and/or family Yes  Discharge Plan Comments: Reviewed chart. Writer spoke with pt's spouse via TC to update him on POC. Pt from home with spouse and plan [facility] for STR, pre-cert to start this AM and updated PT notes are in epic. Writer left  for Olesya Nelson r/t updated PT notes. Follow. The Plan for Transition of Care is related to the following treatment goals: STR    The Patient and/or patient representative  was provided with a choice of provider and agrees   with the discharge plan.  [x] Yes [] No      Home O2 in place on admit: to STR  Pt informed of need to bring portable home O2 tank on day of discharge for nursing to connect prior to leaving:  Not Indicated  Verbalized agreement/Understanding:  Not Indicated

## 2020-07-03 NOTE — PROGRESS NOTES
testing for the coronavirus. \"  Unable to state hospital even with cues. Stated \"June 220\" for month and year but quickly reoriented to July. Able to follow 1 step commands inconsistently    Subjective  Patient lying supine in bed with family at bedside (spouse appears to be legally blind)  Pt agreeable to this PT tx. Pain   No    Bed Mobility   Supine to Sit:    Mod A  and 2 persons - improved initiation  Sit to Supine:   Not Tested  Rolling:   Not Tested  Scooting: Max A at EOB; Mod A of 2 in chair     Transfer Training     Sit to stand: Max A  and 2 persons to STEDY (poor compliance to R LE WBing restrictions)     Max A of 2 persons to SW on first trial; Mod A of 2 on second trial (therapist assisted with maintaining R LE WBing restrictions for both trials; VCs for hand placement)  Stand to sit: Max A  and 2 persons from STEDY     Mod A of 2 persons from SW  Bed to Chair:   Total A with use of gait belt and MOI STEDY, pt unable to follow TTWB R LE despite cues (appears to be both a cognition/processing issue as well as a weakness issue)    Gait Training gait deferred due to difficulty with transfers; pt ambulated 0 ft. Therapeutic Exercise all completed bilaterally unless indicated  Ankle Pumps x 10 reps (AROM L LE, AAROM R LE with decreased range)  Heel slides x 10 reps (AROM L LE, minimal AAROM R LE due to pain and stiffness)    Balance  Sitting:  Fair +; CGA  Comments: 5 minutes at EOB, pt with decreased knee flexion and inversion on B feet, mild IR rotation of R hip    Standing: Fair -; Mod A  and 2 persons  Comments: 20 sec + 10 sec at SW with B UE support, one episode of L knee buckling on first trial due to muscle fatigue    Patient Education      Role of PT, POC, Discharge recommendations, DC recommendations, safety awareness, transfer techniques, HEP and calling for assist with mobility.      Positioning Needs       Pt reclined in chair, alarm set, positioned in proper neutral alignment and pressure relief provided. Call light provided and all needs within reach   Pt verbalized use of call light to ask for assistance      Activity Tolerance   Pt completed therapy session with Dizziness noted with sitting EOB, resolved with time. Attempted to take BP - unable to get reading    Other  None. Assessment :  Patient tolerated treatment session well. Pt's adherence to R LE weight bearing precautions continues to be limited by her confusion and difficulty with grasping therapy concepts. Pt appeared to have better understanding of NWB vs TTWB, but continued to require physical assistance from therapist at R LE to maintain those precautions. Pt did have improved sit<>stand sequencing with standing to walker vs Stedy. Recommending SNF upon discharge as patient functioning well below baseline, demonstrates good rehab potential and unable to return home due to limited or no family support, inability to negotiate stairs to enter home/bedroom/bathroom, burden of care beyond caregiver ability, home environment not conducive to patient recovery and limited safety awareness. Goals (all goals ongoing unless otherwise indicated)  To be met in 3 visits:  1). Independent with LE Ex x 10 reps     To be met in 6 visits:  1).  Supine to/from sit: Mod A   2).  Sit to/from stand: Max A   3).  Bed to chair: Max A   4).  Gait: Ambulate 10 ft.   with  Max A  and use of LRAD (least restrictive assistive device)  5).  Tolerate B LE exercises 3 sets of 10-15 reps    Plan   Continue with plan of care. Signature: Diya Looney PT, DPT #[id number]    If patient discharges from this facility prior to next visit, this note will serve as the Discharge Summary.

## 2020-07-03 NOTE — PROGRESS NOTES
Progress Note    Admit Date:  6/29/2020    Admitted for R hip fracture , S.p IM nailing. POD 3  No fevers  BP high  ARF today , good UOP     Subjective:  Ms. Kiya Norwood seen up in bed, off oxygen, feels ok today   Pain well controlled      Objective:   Vitals:    07/03/20 0745   BP: (!) 189/73   Pulse: 74   Resp: 16   Temp: 99.2 °F (37.3 °C)   SpO2: 94%       Intake/Output Summary (Last 24 hours) at 7/3/2020 1107  Last data filed at 7/3/2020 1015  Gross per 24 hour   Intake 694 ml   Output 450 ml   Net 244 ml       Physical Exam:  General: elderly female , fully awake and oriented   Appears to be not in any distress  Mucous Membranes:  Pink , anicteric  Neck: No JVD, no carotid bruit, no thyromegaly  Chest:  Clear to auscultation bilaterally, no added sounds  Left PAC  Cardiovascular:  RRR S1S2 heard, no murmurs or gallops  Abdomen:  Soft, undistended, non tender, no organomegaly, BS present  Extremities: right hip with pain and expected edema, surgical dressing dry  . No edema or cyanosis.  Distal pulses well felt  Neurological : grossly normal     Scheduled Meds:   losartan  50 mg Oral Daily    flecainide  50 mg Oral 2 times per day    sennosides-docusate sodium  1 tablet Oral BID    azithromycin (ZITHROMAX) 500 mg IVPB  250 mg Intravenous Q24H    metoprolol succinate  25 mg Oral Daily    pantoprazole  40 mg Oral QAM AC    timolol  1 drop Both Eyes BID    sodium chloride flush  10 mL Intravenous 2 times per day    enoxaparin  40 mg Subcutaneous Daily       Continuous Infusions:      PRN Meds:  traMADol **OR** traMADol, magnesium hydroxide, ondansetron, sodium chloride flush, acetaminophen **OR** acetaminophen, polyethylene glycol, [DISCONTINUED] promethazine **OR** ondansetron, potassium chloride      Data:  CBC:   Recent Labs     07/01/20  0735 07/02/20  0530 07/03/20  0640   WBC 7.5 5.0 3.8*   HGB 11.4* 9.6* 9.3*   HCT 33.4* 28.0* 26.8*   MCV 92.2 93.7 93.3   * 82* 91*     BMP:   Recent Labs 07/01/20  0735 07/02/20  0530 07/03/20  0640    135* 133*   K 3.7 3.4* 3.2*   CL 97* 96* 94*   CO2 26 25 27   BUN 12 13 17   CREATININE 0.9 1.1 1.8*     CULTURES  None      RADIOLOGY  FLUORO FOR SURGICAL PROCEDURES   Final Result   Intraprocedural fluoroscopic spot images as above. See separate procedure   report for more information. CT HIP RIGHT WO CONTRAST   Final Result   1. Acute mildly impacted and rotated right femoral neck fracture. Normal   right hip alignment. 2. Abdominal and pelvic lymphadenopathy consistent with history of lymphoma. Images of a prior 2017 PET/CT could not be obtained at this time for   comparison. If they can be loaded into the system a comparison can be made   for interval change. Prior to signing the report to the 08/15/2017 PET/CT   became available for comparison. There is progressive lymphadenopathy   consistent with progressive lymphoma. CT Head WO Contrast   Final Result   Right frontal scalp injury without underlying fracture or acute intracranial   CT abnormality. XR TIBIA FIBULA RIGHT (2 VIEWS)   Final Result   No acute bony abnormality. XR HIP 2-3 VW W PELVIS RIGHT   Final Result   Subcapital right femoral neck fracture. Assessment/Plan:    Rt subcapital femoral neck  fracture   - s/p mechanical fall at home   - mildly impacted and rotated   - Orthopedics consulted: s/p percutaneous pinning of R femoral neck on 6/30 with Dr. Mi Section - POD 3,  pain meds to avoid drowsiness.  Appears to be very sensitive to meds  - PT/OT   - H/H with mild drop as expected --> continue to monitor     Hypokalemia--resolved   - mild, 3.3  - sliding scale replacement    MANDY   - Baseline creatinine ~1.0  - Creatinine on admission 1.0-->1.8  - likely 2/2 hypovolemia with hypotension and poor PO intake   - Hold ARB  - IVF and monitor     TCP  - baseline ~120  - PLT on admission 119-->106-->91  - Chronic, stable     Hx of Follicular Lymphoma   - CT of R hip shows progressive lymphadenopathy concerning for progressive lymphoma   - Follows with Dr. Víctor Johnson, had recent OP visit   - Has completed chemo in the past   - Will need OP follow up regarding new changes on CT-->discused with patient and , she did not want hem/onc consult during admission and would like to see her oncologist OP, reports no acute sx     HTN  - BP is uncontrolled - reports intolerance to several meds and BP is always high in 200s'  - Continue home BB,  cozaar - significant drop post op , now improved  - resume meds as tolerated    GERD  - Continue PPI     Paroxysmal Atrial fibrillation   - not on AC sec to GI bleed   - Continue BB, flecainide    - rate is controlled     DVT Prophylaxis: Lovenox  Diet: DIET GENERAL;  Code Status: Full Code     David Kelley PA-C  7/3/2020 11:10 AM    Agree with above  Changes made to note    Guido Ragland MD 7/3/2020 11:23 AM

## 2020-07-03 NOTE — PROGRESS NOTES
DVT Prophylaxis Monitoring  Recent Labs     07/02/20  0530 07/03/20  0640   CREATININE 1.1 1.8*     Recent Labs     07/03/20  0640   HGB 9.3*   HCT 26.8*   PLT 91*     Estimated Creatinine Clearance: 19 mL/min (A) (based on SCr of 1.8 mg/dL (H)).   Change lovenox to mg daily  Starling Jerauld 7/3/2020 11:25 AM

## 2020-07-04 LAB
ANION GAP SERPL CALCULATED.3IONS-SCNC: 12 MMOL/L (ref 3–16)
BASOPHILS ABSOLUTE: 0 K/UL (ref 0–0.2)
BASOPHILS RELATIVE PERCENT: 1 %
BUN BLDV-MCNC: 15 MG/DL (ref 7–20)
CALCIUM SERPL-MCNC: 8.4 MG/DL (ref 8.3–10.6)
CHLORIDE BLD-SCNC: 102 MMOL/L (ref 99–110)
CO2: 27 MMOL/L (ref 21–32)
CREAT SERPL-MCNC: 1.3 MG/DL (ref 0.6–1.2)
EOSINOPHILS ABSOLUTE: 0.2 K/UL (ref 0–0.6)
EOSINOPHILS RELATIVE PERCENT: 4.2 %
GFR AFRICAN AMERICAN: 48
GFR NON-AFRICAN AMERICAN: 40
GLUCOSE BLD-MCNC: 115 MG/DL (ref 70–99)
HCT VFR BLD CALC: 26.2 % (ref 36–48)
HEMOGLOBIN: 9.2 G/DL (ref 12–16)
LYMPHOCYTES ABSOLUTE: 0.6 K/UL (ref 1–5.1)
LYMPHOCYTES RELATIVE PERCENT: 16.1 %
MCH RBC QN AUTO: 32.6 PG (ref 26–34)
MCHC RBC AUTO-ENTMCNC: 35.1 G/DL (ref 31–36)
MCV RBC AUTO: 93 FL (ref 80–100)
MONOCYTES ABSOLUTE: 0.6 K/UL (ref 0–1.3)
MONOCYTES RELATIVE PERCENT: 14 %
NEUTROPHILS ABSOLUTE: 2.6 K/UL (ref 1.7–7.7)
NEUTROPHILS RELATIVE PERCENT: 64.7 %
PDW BLD-RTO: 13.5 % (ref 12.4–15.4)
PLATELET # BLD: 103 K/UL (ref 135–450)
PMV BLD AUTO: 8.8 FL (ref 5–10.5)
POTASSIUM REFLEX MAGNESIUM: 3.7 MMOL/L (ref 3.5–5.1)
RBC # BLD: 2.81 M/UL (ref 4–5.2)
SODIUM BLD-SCNC: 141 MMOL/L (ref 136–145)
WBC # BLD: 4 K/UL (ref 4–11)

## 2020-07-04 PROCEDURE — 97110 THERAPEUTIC EXERCISES: CPT

## 2020-07-04 PROCEDURE — 99232 SBSQ HOSP IP/OBS MODERATE 35: CPT | Performed by: INTERNAL MEDICINE

## 2020-07-04 PROCEDURE — 2580000003 HC RX 258: Performed by: INTERNAL MEDICINE

## 2020-07-04 PROCEDURE — 1200000000 HC SEMI PRIVATE

## 2020-07-04 PROCEDURE — 6370000000 HC RX 637 (ALT 250 FOR IP): Performed by: HOSPITALIST

## 2020-07-04 PROCEDURE — 6370000000 HC RX 637 (ALT 250 FOR IP): Performed by: ORTHOPAEDIC SURGERY

## 2020-07-04 PROCEDURE — 85025 COMPLETE CBC W/AUTO DIFF WBC: CPT

## 2020-07-04 PROCEDURE — 97530 THERAPEUTIC ACTIVITIES: CPT

## 2020-07-04 PROCEDURE — 97535 SELF CARE MNGMENT TRAINING: CPT

## 2020-07-04 PROCEDURE — 6370000000 HC RX 637 (ALT 250 FOR IP): Performed by: INTERNAL MEDICINE

## 2020-07-04 PROCEDURE — 80048 BASIC METABOLIC PNL TOTAL CA: CPT

## 2020-07-04 PROCEDURE — 6360000002 HC RX W HCPCS: Performed by: HOSPITALIST

## 2020-07-04 PROCEDURE — 2580000003 HC RX 258: Performed by: ORTHOPAEDIC SURGERY

## 2020-07-04 RX ORDER — RISPERIDONE 0.25 MG/1
0.25 TABLET, FILM COATED ORAL NIGHTLY
Status: DISCONTINUED | OUTPATIENT
Start: 2020-07-04 | End: 2020-07-05

## 2020-07-04 RX ADMIN — RISPERIDONE 0.25 MG: 0.25 TABLET ORAL at 20:23

## 2020-07-04 RX ADMIN — FLECAINIDE ACETATE 50 MG: 100 TABLET ORAL at 10:08

## 2020-07-04 RX ADMIN — SENNOSIDES AND DOCUSATE SODIUM 1 TABLET: 8.6; 5 TABLET ORAL at 20:23

## 2020-07-04 RX ADMIN — TIMOLOL MALEATE 1 DROP: 5 SOLUTION/ DROPS OPHTHALMIC at 10:10

## 2020-07-04 RX ADMIN — METOPROLOL SUCCINATE 25 MG: 25 TABLET, EXTENDED RELEASE ORAL at 10:08

## 2020-07-04 RX ADMIN — Medication 10 ML: at 20:24

## 2020-07-04 RX ADMIN — SENNOSIDES AND DOCUSATE SODIUM 1 TABLET: 8.6; 5 TABLET ORAL at 10:08

## 2020-07-04 RX ADMIN — PANTOPRAZOLE SODIUM 40 MG: 40 TABLET, DELAYED RELEASE ORAL at 05:25

## 2020-07-04 RX ADMIN — SODIUM CHLORIDE: 9 INJECTION, SOLUTION INTRAVENOUS at 05:24

## 2020-07-04 RX ADMIN — DOXAZOSIN 2 MG: 2 TABLET ORAL at 10:08

## 2020-07-04 RX ADMIN — ENOXAPARIN SODIUM 30 MG: 30 INJECTION SUBCUTANEOUS at 10:08

## 2020-07-04 RX ADMIN — SODIUM CHLORIDE: 9 INJECTION, SOLUTION INTRAVENOUS at 07:56

## 2020-07-04 RX ADMIN — TIMOLOL MALEATE 1 DROP: 5 SOLUTION/ DROPS OPHTHALMIC at 20:24

## 2020-07-04 RX ADMIN — FLECAINIDE ACETATE 50 MG: 100 TABLET ORAL at 20:23

## 2020-07-04 NOTE — FLOWSHEET NOTE
07/03/20 1948   Vital Signs   Temp 99.4 °F (37.4 °C)   Temp Source Oral   Pulse 78   Heart Rate Source Monitor   Resp 16   BP (!) 191/80   BP Location Left upper arm   BP Upper/Lower Upper   Patient Position Semi fowlers   Level of Consciousness 0   MEWS Score 1   Oxygen Therapy   SpO2 95 %   O2 Device None (Room air)   Pt alert to name, only knows month of birth,. Reoriented pt to time, place and situation. Pt upset and states \" she wants to just go home to her \" explained to pt that she had surgery and needs rehab. Also explained to pt that her  was here all day and just called to make sure she was back to bed. Pt arguing and stating that \"we kid napped her \". Assessment completed dressing to right hip CDI. PM meds given. PRN Tylenol given for pain. Pure wick in place. Pt denies any needs.  Call light within reach and bed alarm on

## 2020-07-04 NOTE — PROGRESS NOTES
Inpatient Physical Therapy Daily Treatment Note    Unit: 2 [place]  Date:  7/4/2020  Patient Name:    Kriss Zavaleta  Admitting diagnosis:  Closed right hip fracture, initial encounter [facility]) [facility]  Admit Date:  6/29/2020  Precautions/Restrictions:  Fall risk, Bed/chair alarm, Lines -Purewick catheter and IV, Confusion, Telemetry, WB Restrictions (TTWB R LE - pt with poor compression of restrictions, use cues for NWB) and spouse is leagally blind   TTWB on Rt LE   Right frontal scalp injury without underlying fracture or acute intracranial   CT abnormality           Discharge Recommendations: SNF  DME needs for discharge: defer to facility       Therapy recommendation for EMS Transport: requires transport by cot due to inability to transfer without lift equiptment    Therapy recommendations for staff:   Assist of 2 with use of MOI STEDY and gait for all transfers to/from chair    History of Present Illness: per H&P    75 y. o. female presents to the ED with right hip pain and headache after a fall.  The patient lost her balance today fell hitting her head and her right hip.  She has pain in her right hip, right leg, and right forehead.  She did not lose consciousness, has had no nausea or vomiting.  She was unable to get up off the ground and came in via ambulance. 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      6/30 - percutaneous pinning fo R femoral neck fx     Home Health S4 Level Recommendation: NA  AM-PAC Mobility Score   AM-PAC Inpatient Mobility Raw Score : 9       Treatment Time:  13:00-14:40  Treatment number: 4  Timed Code Treatment Minutes: 40 minutes  Total Treatment Minutes:  40  minutes    Cognition    A&O orientation not directly assessed. Able to follow 1 step commands    Subjective  Patient lying supine in bed with spouse present   Pt agreeable to this PT tx.      Pain   Yes  Location: R hip  Rating:    mild/10  Pain Medicine Status: Denies need     Bed Mobility   Supine to Sit:    Max A   Sit to Supine:   Not Tested  Rolling: Max A   Scooting: Max A  and 2 persons    Transfer Training     Sit to stand: Max A  and 2 persons, plus full support to [place] RLE  Stand to sit: Max A  and 2 persons  Bed to Chair:   Total A and 2 persons with use of MOI STEDY    Gait Training gait deferred due to difficulty with transfers; pt ambulated 0 ft. Therapeutic Exercise all completed bilaterally unless indicated  Ankle Pumps x 10 reps  Glut sets x 10 reps  LAQ x 10 reps  Exercises written on white board  Balance  Sitting:  Poor; Mod A   Comments: posterior lean    Standing: Poor; Max A  and 2 persons in stedy  Comments:     Patient Education      Role of PT, POC, Discharge recommendations, DC recommendations, safety awareness, transfer techniques, HEP and calling for assist with mobility. Positioning Needs       Pt up in chair, alarm set, positioned in proper neutral alignment and pressure relief provided. Activity Tolerance   Pt completed therapy session with No adverse symptoms noted w/activity. Other      Assessment :  Patient demonstrated good participation this date. Continues to have difficulty maintaining NWB RLE. Recommending SNF at discharge prior to returning to home. Recommending SNF upon discharge as patient functioning well below baseline, demonstrates good rehab potential and unable to return home due to burden of care beyond caregiver ability, home environment not conducive to patient recovery and limited safety awareness. Goals (all goals ongoing unless otherwise indicated)  To be met in 3 visits:  1). Independent with LE Ex x 10 reps     To be met in 6 visits:  1).  Supine to/from sit: Mod A   2).  Sit to/from stand: Max A   3).  Bed to chair: Max A   4).  Gait: Ambulate 10 ft.   with  Max A  and use of LRAD (least restrictive assistive device)  5).   Demonstrate appropriate WB RLE    Plan   Continue with plan of care. Signature:   Nenita Villagomez, PT #[id number]    If patient discharges from this facility prior to next visit, this note will serve as the Discharge Summary.

## 2020-07-04 NOTE — PROGRESS NOTES
Bedside report given to [facility]  pt in stable condition no needs at this time.  Call light within reach

## 2020-07-04 NOTE — PLAN OF CARE
Problem: Falls - Risk of:  Goal: Will remain free from falls  Description: Will remain free from falls  7/3/2020 1301 by Paresh Cordero RN  Outcome: Ongoing  Goal: Absence of physical injury  Description: Absence of physical injury  7/3/2020 1301 by Paresh Cordero RN  Outcome: Ongoing     Problem: SAFETY  Goal: Free from accidental physical injury  7/3/2020 1301 by Paresh Cordero RN  Outcome: Ongoing  Goal: Free from intentional harm  7/3/2020 1301 by Paresh Cordero RN  Outcome: Ongoing     Problem: DAILY CARE  Goal: Daily care needs are met  7/3/2020 2327 by Saray Frey RN  Outcome: Ongoing  7/3/2020 1301 by Paresh Cordero RN  Outcome: Ongoing     Problem: PAIN  Goal: Patient's pain/discomfort is manageable  7/3/2020 2327 by Saray Frey RN  Outcome: Ongoing  7/3/2020 1301 by Paresh Cordero RN  Outcome: Ongoing     Problem: DISCHARGE BARRIERS  Goal: Patient's continuum of care needs are met  Outcome: Ongoing     Problem: Sensory:  Goal: Pain level will decrease  Description: Pain level will decrease  Outcome: Ongoing     Problem: Pain:  Goal: Pain level will decrease  Description: Pain level will decrease  Outcome: Ongoing  Goal: Control of acute pain  Description: Control of acute pain  Outcome: Ongoing

## 2020-07-04 NOTE — PLAN OF CARE
Problem: Falls - Risk of:  Goal: Will remain free from falls  Description: Will remain free from falls  Outcome: Ongoing  Goal: Absence of physical injury  Description: Absence of physical injury  Outcome: Ongoing     Problem: SAFETY  Goal: Free from accidental physical injury  Outcome: Ongoing     Problem: DAILY CARE  Goal: Daily care needs are met  7/4/2020 0956 by Dale Nascimento RN  Outcome: Ongoing  7/3/2020 2327 by Rere Zhang RN  Outcome: Ongoing     Problem: PAIN  Goal: Patient's pain/discomfort is manageable  7/4/2020 0956 by Dale Nascimento RN  Outcome: Ongoing  7/3/2020 2327 by Rere Zhang RN  Outcome: Ongoing

## 2020-07-04 NOTE — PROGRESS NOTES
Pt spouse came to nurses' station stating\" Pt wants to take am meds. \" Administered am meds see mar. A/O. Denies any pain. Call light within reach.

## 2020-07-04 NOTE — PROGRESS NOTES
Occupational Therapy Daily Treatment Note    Unit: 2 [place]  Date:  7/4/2020  Patient Name:    Robb Puentes  Admitting diagnosis:  Closed right hip fracture, initial encounter [facility]) [facility] Date:  6/29/2020  Precautions/Restrictions:  Fall risk, Bed/chair alarm, Lines -Purewick catheter and IV, Confusion, Telemetry, WB Restrictions (TTWB R LE - pt with poor compression of restrictions, use cues for NWB) and spouse is leagally blind   TTWB on Rt LE   Right frontal scalp injury without underlying fracture or acute intracranial   CT abnormality         Discharge Recommendations: SNF  DME needs for discharge: defer to facility       Therapy recommendations for staff:   Assist of 2 (maximal assist) with use of MOI STEDY and gait belt for all transfers to/from BSC/chair    AM-PAC Score: AM-PAC Inpatient Daily Activity Raw Score: 14  Home Health S4 Level: NA       Treatment Time:  9004-4615  Treatment number:  4  Total Treatment Time:   40 minutes    History of Present Illness: per H&P    75 y. o. female presents to the ED with right hip pain and headache after a fall.  The patient lost her balance today fell hitting her head and her right hip.  She has pain in her right hip, right leg, and right forehead.  She did not lose consciousness, has had no nausea or vomiting.  She was unable to get up off the ground and came in via ambulance. 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      6/30 - percutaneous pinning fo R femoral neck fx     Subjective:  Pt supine in bed upon therapist arrival. Pt agreeable to work with therapy at this time. Pt smiling and appropriate throughout session. Pain   None reported throughout session. Bed Mobility:   Supine to Sit:  Max A of 2  Sit to Supine:  Not Tested  Rolling:           Not Tested  Scooting: Max A of 2 at [employer]     Transfer Training:   Sit to stand:    Max A of 2  Stand to sit:  Mod A of 2  Bed to Chair:  Total A via Stedy  Bed to [facility]:   Not Tested  Standard toilet:   Not Tested    Activity Tolerance   Pt completed therapy session with No adverse symptoms noted w/activity    ADL Training:   Upper body dressing:  Not Tested  Upper body bathing:  Not Tested  Lower body dressing:  Max A  Lower body bathing:  Not Tested  Toileting:   Not Tested (anyack)   Grooming/Hygiene:  Not Tested    Therapeutic Exercise:   N/A    Patient Education:   Role of OT   WB status. Pt unable to recall throughout session. Positioning Needs:   Up in chair, call light and needs in reach. Alarm Set    Family Present:  Yes -      Assessment: Pt with limited progress this date related to inability to recall WB precautions. Pt limited by cognition. Pt benefits from afternoon session related to cognitive impairment. Pt may benefit from continued skilled occupational therapy while in the hospital and upon discharge in order to progress to a safe and more indpt level of functioning. GOALS  To be met in 3 Visits:  1). Bed to BSC/chair: assess when appropriate (upgraded 7/3/20 to MAX A)     To be met in 5 Visits:  1). Supine to/from VXB:             DBC A (Goal addressed, continue, 7/4/2020)   2). Upper Body Bathing:         CGA  3). Lower Body Bathing:         Mod A   4). Upper Body Dressing:       CGA  5). Lower Body Dressing:       Mod A   6).  Pt to demonstrate UE exs x 15 reps with minimal cues      Plan: cont with POC      Orpha Sample, MOT, OTR/L   [id number]       If patient discharges from this facility prior to next visit, this note will serve as the Discharge Summary

## 2020-07-04 NOTE — PROGRESS NOTES
Bedside report given and pt care transferred to [facility]. Pt denies needs at this time. Call light within reach.

## 2020-07-04 NOTE — PROGRESS NOTES
Occupational Therapy/Physical Therapy Attempt    Unit: [address] [place]   Date:  7/4/2020  Patient Name:    Evan Rodriguez  Admitting diagnosis:  Closed right hip fracture, initial encounter [facility]) [facility]  Admit Date:  6/29/2020    Attempt @ 1000: Decline. Pt becoming agitated upon attempt, swinging at therapist. Will re-attempt tomorrow, as time allows and pt is agreeable. Thank you.      Jacqueline Haque, MOT, OTR/L   [id number]  Matheus Catalan, PT #[id number]    No Charge

## 2020-07-04 NOTE — PROGRESS NOTES
Progress Note    Admit Date:  6/29/2020    Admitted for R hip fracture , S.p IM nailing. POD 4     Subjective:  Ms. Jaclyn Feliz has been increasingly confused today. Aggressive towards staff as well. Family is very concerned. Patient and family reports she has baseline memory issues but  reports no formal dx of dementia. BP is still marlin but ARB held for MANDY. Objective:   Vitals:    07/04/20 0902   BP: (!) 163/83   Pulse: 74   Resp: 16   Temp: 97 °F (36.1 °C)   SpO2: 95%       Intake/Output Summary (Last 24 hours) at 7/4/2020 1200  Last data filed at 7/4/2020 1113  Gross per 24 hour   Intake 1763.37 ml   Output 2850 ml   Net -1086.63 ml       Physical Exam:  General: elderly female , fully awake, oriented to self only today. Appears anxious   Mucous Membranes:  Pink   Neck:  no thyromegaly  Chest:  Clear to auscultation bilaterally, no added sounds  Left PAC  Cardiovascular:  RRR S1S2 heard, no murmurs or gallops  Abdomen:  Soft, undistended, non tender, no organomegaly, BS present  Extremities: right hip with pain and expected edema, surgical dressing dry  . No edema or cyanosis. Distal pulses well felt  Neurological : grossly normal, confusion, does not know she is in a hospital, +hallucinations, +agitation       I Annika Bishop have reviewed the chart on Mona Mckenzie and personally interviewed and examined patient, reviewed the data (labs and imaging) and after discussion with my PA formulated the plan. Agree with note with the following edits. HPI:     I reviewed the patient's Past Medical History, Past Surgical History, Medications, and Allergies. Confusion and agitation , aggressive behavior noted      General:  Awake, alert and oriented.  Appears to be not in any distress  Mucous Membranes:  Pink , anicteric  Neck: No JVD, no carotid bruit, no thyromegaly  Chest:  Clear to auscultation bilaterally, no added sounds, left PAC  Cardiovascular:  RRR S1S2 heard, no murmurs or gallops  Abdomen:  Soft, undistended, non tender, no organomegaly, BS present  Extremities: right hip dressing dry with expected edema  . Distal pulses well felt  Neurological : grossly normal with mild confusion                   Scheduled Meds:   doxazosin  2 mg Oral Daily    enoxaparin  30 mg Subcutaneous Daily    flecainide  50 mg Oral 2 times per day    sennosides-docusate sodium  1 tablet Oral BID    metoprolol succinate  25 mg Oral Daily    pantoprazole  40 mg Oral QAM AC    timolol  1 drop Both Eyes BID    sodium chloride flush  10 mL Intravenous 2 times per day       Continuous Infusions:      PRN Meds:  traMADol **OR** traMADol, magnesium hydroxide, ondansetron, sodium chloride flush, acetaminophen **OR** acetaminophen, polyethylene glycol, [DISCONTINUED] promethazine **OR** ondansetron, potassium chloride      Data:  CBC:   Recent Labs     07/02/20  0530 07/03/20  0640 07/04/20  0428   WBC 5.0 3.8* 4.0   HGB 9.6* 9.3* 9.2*   HCT 28.0* 26.8* 26.2*   MCV 93.7 93.3 93.0   PLT 82* 91* 103*     BMP:   Recent Labs     07/02/20  0530 07/03/20  0640 07/04/20  0428   * 133* 141   K 3.4* 3.2* 3.7   CL 96* 94* 102   CO2 25 27 27   BUN 13 17 15   CREATININE 1.1 1.8* 1.3*     CULTURES  None      RADIOLOGY  FLUORO FOR SURGICAL PROCEDURES   Final Result   Intraprocedural fluoroscopic spot images as above. See separate procedure   report for more information. CT HIP RIGHT WO CONTRAST   Final Result   1. Acute mildly impacted and rotated right femoral neck fracture. Normal   right hip alignment. 2. Abdominal and pelvic lymphadenopathy consistent with history of lymphoma. Images of a prior 2017 PET/CT could not be obtained at this time for   comparison. If they can be loaded into the system a comparison can be made   for interval change. Prior to signing the report to the 08/15/2017 PET/CT   became available for comparison.   There is progressive lymphadenopathy   consistent with progressive lymphoma. CT Head WO Contrast   Final Result   Right frontal scalp injury without underlying fracture or acute intracranial   CT abnormality. XR TIBIA FIBULA RIGHT (2 VIEWS)   Final Result   No acute bony abnormality. XR HIP 2-3 VW W PELVIS RIGHT   Final Result   Subcapital right femoral neck fracture. Assessment/Plan:    Rt subcapital femoral neck  fracture   - s/p mechanical fall at home   - mildly impacted and rotated   - Orthopedics consulted: s/p percutaneous pinning of R femoral neck on 6/30 with Dr. [facility] - POD 4,  pain meds to avoid drowsiness.  Appears to be very sensitive to meds  - PT/OT   - H/H with mild drop as expected --> continue to monitor     Delirium- hx of dementia   - likely 2/2 anesthesia/MANDY and prolonged admission   - Aggressive behavior which is very abnormal   - Will try low dose risperdal nightly for symptom control     Hypokalemia--resolved   - mild, 3.3  - sliding scale replacement    MANDY   - Baseline creatinine ~1.0  - Creatinine on admission 1.0-->1.8-->1.3  - likely 2/2 hypovolemia with hypotension and poor PO intake   - Hold ARB  - IVF and monitor     TCP  - baseline ~120  - PLT on admission 119-->106-->91  - Chronic, stable     Hx of Follicular Lymphoma   - CT of R hip shows progressive lymphadenopathy concerning for progressive lymphoma   - Follows with Dr. Elvis Fraser, had recent OP visit   - Has completed chemo in the past   - Will need OP follow up regarding new changes on CT-->discused with patient and , she did not want hem/onc consult during admission and would like to see her oncologist OP, reports no acute sx     HTN  - BP is uncontrolled - reports intolerance to several meds and BP is always high in 200s'  - Continue home BB,  cozaar - significant drop post op , now improved  - resume meds as tolerated    GERD  - Continue PPI     Paroxysmal Atrial fibrillation   - not on AC sec to GI bleed   - Continue BB, flecainide    - rate is controlled     DVT Prophylaxis: Lovenox  Diet: DIET GENERAL;  Code Status: Full Code     Damon Ayala PA-C  7/4/2020 12:00 PM    Agree with above  Changes made to note    Lesia Cleveland MD 7/4/2020 12:00 PM

## 2020-07-05 LAB
ANION GAP SERPL CALCULATED.3IONS-SCNC: 16 MMOL/L (ref 3–16)
BASOPHILS ABSOLUTE: 0.1 K/UL (ref 0–0.2)
BASOPHILS RELATIVE PERCENT: 1.6 %
BUN BLDV-MCNC: 15 MG/DL (ref 7–20)
CALCIUM SERPL-MCNC: 8.8 MG/DL (ref 8.3–10.6)
CHLORIDE BLD-SCNC: 97 MMOL/L (ref 99–110)
CO2: 27 MMOL/L (ref 21–32)
CREAT SERPL-MCNC: 1 MG/DL (ref 0.6–1.2)
EOSINOPHILS ABSOLUTE: 0.2 K/UL (ref 0–0.6)
EOSINOPHILS RELATIVE PERCENT: 4.9 %
GFR AFRICAN AMERICAN: >60
GFR NON-AFRICAN AMERICAN: 54
GLUCOSE BLD-MCNC: 101 MG/DL (ref 70–99)
HCT VFR BLD CALC: 27.8 % (ref 36–48)
HEMOGLOBIN: 9.8 G/DL (ref 12–16)
LYMPHOCYTES ABSOLUTE: 0.7 K/UL (ref 1–5.1)
LYMPHOCYTES RELATIVE PERCENT: 16.7 %
MAGNESIUM: 1.5 MG/DL (ref 1.8–2.4)
MCH RBC QN AUTO: 32.7 PG (ref 26–34)
MCHC RBC AUTO-ENTMCNC: 35.3 G/DL (ref 31–36)
MCV RBC AUTO: 92.8 FL (ref 80–100)
MONOCYTES ABSOLUTE: 0.5 K/UL (ref 0–1.3)
MONOCYTES RELATIVE PERCENT: 10.5 %
NEUTROPHILS ABSOLUTE: 3 K/UL (ref 1.7–7.7)
NEUTROPHILS RELATIVE PERCENT: 66.3 %
PDW BLD-RTO: 13.6 % (ref 12.4–15.4)
PLATELET # BLD: 115 K/UL (ref 135–450)
PMV BLD AUTO: 8.9 FL (ref 5–10.5)
POTASSIUM REFLEX MAGNESIUM: 3.2 MMOL/L (ref 3.5–5.1)
RBC # BLD: 2.99 M/UL (ref 4–5.2)
SODIUM BLD-SCNC: 140 MMOL/L (ref 136–145)
WBC # BLD: 4.5 K/UL (ref 4–11)

## 2020-07-05 PROCEDURE — 2580000003 HC RX 258: Performed by: INTERNAL MEDICINE

## 2020-07-05 PROCEDURE — 80048 BASIC METABOLIC PNL TOTAL CA: CPT

## 2020-07-05 PROCEDURE — 6370000000 HC RX 637 (ALT 250 FOR IP): Performed by: ORTHOPAEDIC SURGERY

## 2020-07-05 PROCEDURE — 85025 COMPLETE CBC W/AUTO DIFF WBC: CPT

## 2020-07-05 PROCEDURE — 83735 ASSAY OF MAGNESIUM: CPT

## 2020-07-05 PROCEDURE — 6370000000 HC RX 637 (ALT 250 FOR IP): Performed by: HOSPITALIST

## 2020-07-05 PROCEDURE — 6360000002 HC RX W HCPCS: Performed by: ORTHOPAEDIC SURGERY

## 2020-07-05 PROCEDURE — 6360000002 HC RX W HCPCS: Performed by: INTERNAL MEDICINE

## 2020-07-05 PROCEDURE — 1200000000 HC SEMI PRIVATE

## 2020-07-05 PROCEDURE — 6360000002 HC RX W HCPCS: Performed by: HOSPITALIST

## 2020-07-05 PROCEDURE — 99232 SBSQ HOSP IP/OBS MODERATE 35: CPT | Performed by: INTERNAL MEDICINE

## 2020-07-05 PROCEDURE — 2580000003 HC RX 258: Performed by: ORTHOPAEDIC SURGERY

## 2020-07-05 PROCEDURE — 6370000000 HC RX 637 (ALT 250 FOR IP): Performed by: INTERNAL MEDICINE

## 2020-07-05 PROCEDURE — 6370000000 HC RX 637 (ALT 250 FOR IP): Performed by: PHYSICIAN ASSISTANT

## 2020-07-05 RX ORDER — RISPERIDONE 0.25 MG/1
0.25 TABLET, FILM COATED ORAL 2 TIMES DAILY
Status: DISCONTINUED | OUTPATIENT
Start: 2020-07-05 | End: 2020-07-06

## 2020-07-05 RX ORDER — SENNA AND DOCUSATE SODIUM 50; 8.6 MG/1; MG/1
1 TABLET, FILM COATED ORAL 2 TIMES DAILY
Qty: 30 TABLET | Refills: 0
Start: 2020-07-05 | End: 2021-09-21

## 2020-07-05 RX ORDER — RISPERIDONE 0.25 MG/1
0.25 TABLET, FILM COATED ORAL NIGHTLY
Qty: 10 TABLET | Refills: 0
Start: 2020-07-05 | End: 2020-07-06

## 2020-07-05 RX ORDER — SODIUM CHLORIDE 9 MG/ML
INJECTION, SOLUTION INTRAVENOUS
Status: DISPENSED
Start: 2020-07-05 | End: 2020-07-05

## 2020-07-05 RX ORDER — MAGNESIUM SULFATE 1 G/100ML
1 INJECTION INTRAVENOUS PRN
Status: DISCONTINUED | OUTPATIENT
Start: 2020-07-05 | End: 2020-07-06 | Stop reason: HOSPADM

## 2020-07-05 RX ADMIN — POTASSIUM CHLORIDE 10 MEQ: 7.46 INJECTION, SOLUTION INTRAVENOUS at 07:46

## 2020-07-05 RX ADMIN — TIMOLOL MALEATE 1 DROP: 5 SOLUTION/ DROPS OPHTHALMIC at 09:12

## 2020-07-05 RX ADMIN — POTASSIUM CHLORIDE 10 MEQ: 7.46 INJECTION, SOLUTION INTRAVENOUS at 09:11

## 2020-07-05 RX ADMIN — POTASSIUM CHLORIDE 10 MEQ: 7.46 INJECTION, SOLUTION INTRAVENOUS at 10:14

## 2020-07-05 RX ADMIN — SENNOSIDES AND DOCUSATE SODIUM 1 TABLET: 8.6; 5 TABLET ORAL at 09:11

## 2020-07-05 RX ADMIN — DOXAZOSIN 2 MG: 2 TABLET ORAL at 09:11

## 2020-07-05 RX ADMIN — TIMOLOL MALEATE 1 DROP: 5 SOLUTION/ DROPS OPHTHALMIC at 20:57

## 2020-07-05 RX ADMIN — FLECAINIDE ACETATE 50 MG: 100 TABLET ORAL at 21:02

## 2020-07-05 RX ADMIN — PANTOPRAZOLE SODIUM 40 MG: 40 TABLET, DELAYED RELEASE ORAL at 05:14

## 2020-07-05 RX ADMIN — METOPROLOL SUCCINATE 25 MG: 25 TABLET, EXTENDED RELEASE ORAL at 09:11

## 2020-07-05 RX ADMIN — POTASSIUM CHLORIDE 10 MEQ: 7.46 INJECTION, SOLUTION INTRAVENOUS at 05:52

## 2020-07-05 RX ADMIN — ENOXAPARIN SODIUM 30 MG: 30 INJECTION SUBCUTANEOUS at 09:11

## 2020-07-05 RX ADMIN — FLECAINIDE ACETATE 50 MG: 100 TABLET ORAL at 09:11

## 2020-07-05 RX ADMIN — RISPERIDONE 0.25 MG: 0.25 TABLET ORAL at 21:02

## 2020-07-05 RX ADMIN — Medication 10 ML: at 21:01

## 2020-07-05 RX ADMIN — MAGNESIUM SULFATE HEPTAHYDRATE 3 G: 500 INJECTION, SOLUTION INTRAMUSCULAR; INTRAVENOUS at 12:02

## 2020-07-05 RX ADMIN — Medication 10 ML: at 09:12

## 2020-07-05 RX ADMIN — SENNOSIDES AND DOCUSATE SODIUM 1 TABLET: 8.6; 5 TABLET ORAL at 21:02

## 2020-07-05 RX ADMIN — ACETAMINOPHEN 650 MG: 325 TABLET ORAL at 21:02

## 2020-07-05 ASSESSMENT — PAIN SCALES - GENERAL: PAINLEVEL_OUTOF10: 3

## 2020-07-05 NOTE — PROGRESS NOTES
Pt in bed sleeping, respirations observed. Handoff report and  Care transferred to [facility].

## 2020-07-05 NOTE — CARE COORDINATION
DC order noted. TC to Jacklyn Amadeo @ [facility] she states she does have precert for the pt but is not able to accept her today due to pt's confusion and the fact that pt has a telesitter and was started on Lonn Song states she would like the pt to have one more night to see how she responds to the medication. TC to [facility] and she states pt does not have telesitter at this time.

## 2020-07-05 NOTE — PLAN OF CARE
Problem: Falls - Risk of:  Goal: Will remain free from falls  Description: Will remain free from falls  7/4/2020 2247 by Renae Bob RN  Outcome: Ongoing    Goal: Absence of physical injury  Description: Absence of physical injury  7/4/2020 2247 by Renae Bob RN  Outcome: Ongoing     Problem: SAFETY  Goal: Free from accidental physical injury  7/4/2020 2247 by Renae Bob RN  Outcome: Ongoing     Problem: DAILY CARE  Goal: Daily care needs are met  7/4/2020 2247 by Renae Bob RN  Outcome: Ongoing     Problem: PAIN  Goal: Patient's pain/discomfort is manageable  7/4/2020 2247 by Renae Bob RN  Outcome: Ongoing     Problem: SKIN INTEGRITY  Goal: Skin integrity is maintained or improved  7/4/2020 2247 by Renae Bob RN  Outcome: Ongoing     Problem: KNOWLEDGE DEFICIT  Goal: Patient/S.O. demonstrates understanding of disease process, treatment plan, medications, and discharge instructions. 7/4/2020 2247 by Renae Bob RN  Outcome: Ongoing     Problem: DISCHARGE BARRIERS  Goal: Patient's continuum of care needs are met  7/4/2020 2247 by Renae Bob RN  Outcome: Ongoing     Problem:  Activity:  Goal: Ability to ambulate will improve  Description: Ability to ambulate will improve  7/4/2020 2247 by Renae Bob RN  Outcome: Ongoing  Goal: Ability to perform activities at highest level will improve  Description: Ability to perform activities at highest level will improve  7/4/2020 2247 by Renae Bob RN  Outcome: Ongoing     Problem: Health Behavior:  Goal: Identification of resources available to assist in meeting health care needs will improve  Description: Identification of resources available to assist in meeting health care needs will improve  7/4/2020 2247 by Renae Bob RN  Outcome: Ongoing     Problem: Nutritional:  Goal: Ability to attain and maintain optimal nutritional status will improve  Description: Ability to attain and maintain optimal nutritional status will improve  7/4/2020 2247 by Lisbeth Blank RN  Outcome: Ongoing     Problem: Physical Regulation:  Goal: Will remain free from infection  Description: Will remain free from infection  7/4/2020 2247 by Lisbeth Blank RN  Outcome: Ongoing    Goal: Postoperative complications will be avoided or minimized  Description: Postoperative complications will be avoided or minimized  7/4/2020 2247 by Lisbeth Blank RN  Outcome: Ongoing  Goal: Diagnostic test results will improve  Description: Diagnostic test results will improve  7/4/2020 2247 by Lisbeth Blank RN  Outcome: Ongoing     Problem: Respiratory:  Goal: Ability to maintain adequate ventilation will improve  Description: Ability to maintain adequate ventilation will improve  7/4/2020 2247 by Lisbeth Blank RN  Outcome: Ongoing     Problem: Safety:  Goal: Ability to remain free from injury will improve  Description: Ability to remain free from injury will improve  7/4/2020 2247 by Lisbeth Blank RN  Outcome: Ongoing     Problem: Self-Care:  Goal: Ability to meet self-care needs will improve  Description: Ability to meet self-care needs will improve  7/4/2020 2247 by Lisbeth Blank RN  Outcome: Ongoing     Problem: Self-Concept:  Goal: Ability to maintain and perform role responsibilities to the fullest extent possible will improve  Description: Ability to maintain and perform role responsibilities to the fullest extent possible will improve  7/4/2020 2247 by Lisbeth Blank RN  Outcome: Ongoing    Goal: Verbalizations of decreased anxiety will increase  Description: Verbalizations of decreased anxiety will increase  7/4/2020 2247 by Lisbeth Blank RN  Outcome: Ongoing     Problem: Sensory:  Goal: Pain level will decrease  Description: Pain level will decrease  7/4/2020 2247 by Lisbeth Blank RN  Outcome: Ongoing     Problem: Skin Integrity:  Goal: Demonstration of wound healing without infection will improve  Description: Demonstration of wound healing without infection will improve  7/4/2020 2247 by Hillary Pham RN  Outcome: Ongoing  Goal: Risk for impaired skin integrity will decrease  Description: Risk for impaired skin integrity will decrease  7/4/2020 2247 by Hillary Pham RN  Outcome: Ongoing     Problem: Tissue Perfusion:  Goal: Peripheral tissue perfusion will improve  Description: Peripheral tissue perfusion will improve  7/4/2020 2247 by Hillary Pham RN  Outcome: Ongoing  Goal: Risk of venous thrombosis will decrease  Description: Risk of venous thrombosis will decrease  7/4/2020 2247 by Hillary Pham RN  Outcome: Ongoing     Problem: Urinary Elimination:  Goal: Ability to reestablish a normal urinary elimination pattern will improve - after catheter removal  Description: Ability to reestablish a normal urinary elimination pattern will improve  7/4/2020 2247 by Hillary Pham RN  Outcome: Ongoing     Problem: Pain:  Goal: Pain level will decrease  Description: Pain level will decrease  7/4/2020 2247 by Hillary Pham RN  Outcome: Ongoing  Goal: Control of acute pain  Description: Control of acute pain  7/4/2020 2247 by Hillary Pham RN  Outcome: Ongoing  Goal: Control of chronic pain  Description: Control of chronic pain  7/4/2020 2247 by Hillary Pham RN  Outcome: Ongoing

## 2020-07-05 NOTE — PROGRESS NOTES
93.0 92.8   PLT 91* 103* 115*     BMP:   Recent Labs     07/03/20  0640 07/04/20  0428 07/05/20  0515   * 141 140   K 3.2* 3.7 3.2*   CL 94* 102 97*   CO2 27 27 27   BUN 17 15 15   CREATININE 1.8* 1.3* 1.0     CULTURES  None      RADIOLOGY  FLUORO FOR SURGICAL PROCEDURES   Final Result   Intraprocedural fluoroscopic spot images as above. See separate procedure   report for more information. CT HIP RIGHT WO CONTRAST   Final Result   1. Acute mildly impacted and rotated right femoral neck fracture. Normal   right hip alignment. 2. Abdominal and pelvic lymphadenopathy consistent with history of lymphoma. Images of a prior 2017 PET/CT could not be obtained at this time for   comparison. If they can be loaded into the system a comparison can be made   for interval change. Prior to signing the report to the 08/15/2017 PET/CT   became available for comparison. There is progressive lymphadenopathy   consistent with progressive lymphoma. CT Head WO Contrast   Final Result   Right frontal scalp injury without underlying fracture or acute intracranial   CT abnormality. XR TIBIA FIBULA RIGHT (2 VIEWS)   Final Result   No acute bony abnormality. XR HIP 2-3 VW W PELVIS RIGHT   Final Result   Subcapital right femoral neck fracture. Assessment/Plan:    Rt subcapital femoral neck  fracture   - s/p mechanical fall at home   - mildly impacted and rotated   - Orthopedics consulted: s/p percutaneous pinning of R femoral neck on 6/30 with Dr. Nenita Pollack - POD 5,  pain meds to avoid drowsiness.  Appears to be very sensitive to meds  - PT/OT   - H/H with mild drop as expected --> continue to monitor     Delirium- hx of dementia   - likely 2/2 anesthesia/MANDY and prolonged admission   - Aggressive behavior which is very abnormal per   -  low dose risperdal nightly added and improved  - ok to dc today     Hypokalemia--resolved   - mild, 3.3  - sliding scale replacement    MANDY   - Baseline creatinine ~1.0  - Creatinine on admission 1.0-->1.8-->1.3  - likely 2/2 hypovolemia with hypotension and poor PO intake   - Hold ARB  - IVF and monitor     TCP  - baseline ~120  - PLT on admission 119-->106-->91  - Chronic, stable     Hx of Follicular Lymphoma   - CT of R hip shows progressive lymphadenopathy concerning for progressive lymphoma   - Follows with Dr. Elvis Fraser, had recent OP visit   - Has completed chemo in the past   - Will need OP follow up regarding new changes on CT-->discused with patient and , she did not want hem/onc consult during admission and would like to see her oncologist OP, reports no acute sx     HTN  - BP is uncontrolled - reports intolerance to several meds and BP is always high in 200s'  - Continue home BB,  cozaar - significant drop post op , now improved  - resume meds as tolerated    GERD  - Continue PPI     Paroxysmal Atrial fibrillation   - not on AC sec to GI bleed   - Continue BB, flecainide    - rate is controlled     DVT Prophylaxis: Lovenox  Diet: DIET GENERAL;  Code Status: Full Code         Rebekah Alan MD 7/5/2020 2:37 PM

## 2020-07-05 NOTE — DISCHARGE INSTR - COC
Continuity of Care Form    Patient Name: Edith Ross   :  1945  MRN:  [id number]    Admit date:  2020  Discharge date:  20    Code Status Order: Full Code   Advance Directives:   Advance Care Flowsheet Documentation     Date/Time Healthcare Directive Type of Healthcare Directive Copy in [address] Agent's Name Healthcare Agent's Phone Number    20 1723  Yes, patient has an advance directive for healthcare treatment  --  Yes, copy in chart  --  --  --    20 1006  Yes, patient has an advance directive for healthcare treatment  Durable power of  for health care;Living will  Yes, copy in chart  Adult Children  Olinda Santiago  [phone]    20 5184  No, patient does not have an advance directive for healthcare treatment  --  --  --  --  --          Admitting Physician:  Oksana Cintron MD  PCP: Camilo Pérez MD    Discharging Nurse: [facility] Unit/Room#: 0226/0226-01  Discharging Unit Phone Number: [phone]    Emergency Contact:   Extended Emergency Contact Information  Primary Emergency Contact: Govind Miramontes  Address: [address], [address] Teresia Canavan of [address] Phone: [phone]  Work Phone: [phone]  Mobile Phone: [phone]  Relation: Spouse  Secondary Emergency Contact: Liberty Antunez [address] Phone: [phone]  Relation: Child    Past Surgical History:  Past Surgical History:   Procedure Laterality Date    APPENDECTOMY      CATARACT REMOVAL          CHOLECYSTECTOMY          COLONOSCOPY  2015    polyp    ENDOSCOPY, COLON, DIAGNOSTIC  2011    bx stomach polyp    HIP SURGERY Right 2020    RIGHT PERCUTANEOUS HIP PINNING (Right Hip) w./ Dr Lorna Moreno 20    HIP SURGERY Right 2020    RIGHT PERCUTANEOUS HIP PINNING performed by Genaro Hobbs DO at [address] [place] [address] 78    TONSILLECTOMY      UPPER GASTROINTESTINAL ENDOSCOPY      UPPER GASTROINTESTINAL ENDOSCOPY  2/27/2015    gastric polyp       Immunization History: There is no immunization history on file for this patient.     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    Migraine G43.909    Arrhythmia I49.9    Gout M10.9    Diarrhea R19.7    Ileus (HCC) K56.7    PAF (paroxysmal atrial fibrillation) (HCC) I48.0    N&V (nausea and vomiting) R11.2    Anemia D64.9    Hypokalemia E87.6    Vomiting and diarrhea R11.10, R19.7    Partial small bowel obstruction (HCC) K56.600    GERD (gastroesophageal reflux disease) K21.9    Thyroid disease E07.9    Small bowel obstruction (HCC) K56.609    Chest pain R07.9    HTN (hypertension), malignant I10    Hypertensive left ventricular hypertrophy, without heart failure Q73.0    Diastolic dysfunction U28.78    [facility] S72.001A       Isolation/Infection:   Isolation          No Isolation        Patient Infection Status     Infection Onset Added Last Indicated Last Indicated By Review Planned Expiration Resolved Resolved By    None active    Resolved    COVID-19 Rule Out 06/30/20 06/30/20 06/30/20 COVID-19 (Ordered)   06/30/20 Rule-Out Test Resulted          Nurse Assessment:  Last Vital Signs: BP (!) 198/86   Pulse 71   Temp 97.7 °F (36.5 °C) (Oral)   Resp 16   Ht 5' (1.524 m)   Wt 111 lb 11.2 oz (50.7 kg)   SpO2 98%   BMI 21.81 kg/m²     Last documented pain score (0-10 scale): Pain Level: 0  Last Weight:   Wt Readings from Last 1 Encounters:   07/01/20 111 lb 11.2 oz (50.7 kg)     Mental Status:  disoriented and alert    IV Access:  - None    Nursing Mobility/ADLs:  Walking   Assisted  Transfer  Assisted  Bathing  Assisted  Dressing  Assisted  Toileting  Assisted  Feeding  Assisted  Med Admin  Assisted  Med Delivery EDT    PHYSICIAN SECTION    Prognosis: Good    Condition at Discharge: Stable    Rehab Potential (if transferring to Rehab): Good    Recommended Labs or Other Treatments After Discharge:     Cbc in 2 weeks  Francia ACOSTA [address] [place] [address] for lymphoma in 3 weeks      Physician Certification: I certify the above information and transfer of Kevon Solis  is necessary for the continuing treatment of the diagnosis listed and that she requires [place] for less 30 days.      Update Admission H&P: No change in H&P    PHYSICIAN SIGNATURE:  Electronically signed by Josi Mabry MD on 7/6/20 at 12:58 PM EDT

## 2020-07-06 VITALS
TEMPERATURE: 97.7 F | HEART RATE: 65 BPM | OXYGEN SATURATION: 95 % | HEIGHT: 60 IN | SYSTOLIC BLOOD PRESSURE: 150 MMHG | BODY MASS INDEX: 21.93 KG/M2 | DIASTOLIC BLOOD PRESSURE: 81 MMHG | RESPIRATION RATE: 16 BRPM | WEIGHT: 111.7 LBS

## 2020-07-06 LAB
ANION GAP SERPL CALCULATED.3IONS-SCNC: 13 MMOL/L (ref 3–16)
BASOPHILS ABSOLUTE: 0.1 K/UL (ref 0–0.2)
BASOPHILS RELATIVE PERCENT: 1.5 %
BUN BLDV-MCNC: 19 MG/DL (ref 7–20)
CALCIUM SERPL-MCNC: 8.7 MG/DL (ref 8.3–10.6)
CHLORIDE BLD-SCNC: 95 MMOL/L (ref 99–110)
CO2: 28 MMOL/L (ref 21–32)
CREAT SERPL-MCNC: 1.1 MG/DL (ref 0.6–1.2)
EOSINOPHILS ABSOLUTE: 0.2 K/UL (ref 0–0.6)
EOSINOPHILS RELATIVE PERCENT: 5.1 %
GFR AFRICAN AMERICAN: 58
GFR NON-AFRICAN AMERICAN: 48
GLUCOSE BLD-MCNC: 104 MG/DL (ref 70–99)
HCT VFR BLD CALC: 27.3 % (ref 36–48)
HEMOGLOBIN: 9.4 G/DL (ref 12–16)
LYMPHOCYTES ABSOLUTE: 0.9 K/UL (ref 1–5.1)
LYMPHOCYTES RELATIVE PERCENT: 23.4 %
MAGNESIUM: 2.4 MG/DL (ref 1.8–2.4)
MCH RBC QN AUTO: 32.3 PG (ref 26–34)
MCHC RBC AUTO-ENTMCNC: 34.6 G/DL (ref 31–36)
MCV RBC AUTO: 93.6 FL (ref 80–100)
MONOCYTES ABSOLUTE: 0.5 K/UL (ref 0–1.3)
MONOCYTES RELATIVE PERCENT: 13.3 %
NEUTROPHILS ABSOLUTE: 2.3 K/UL (ref 1.7–7.7)
NEUTROPHILS RELATIVE PERCENT: 56.7 %
PDW BLD-RTO: 13.6 % (ref 12.4–15.4)
PLATELET # BLD: 130 K/UL (ref 135–450)
PMV BLD AUTO: 8.9 FL (ref 5–10.5)
POTASSIUM SERPL-SCNC: 3.5 MMOL/L (ref 3.5–5.1)
RBC # BLD: 2.91 M/UL (ref 4–5.2)
SARS-COV-2, NAAT: NOT DETECTED
SODIUM BLD-SCNC: 136 MMOL/L (ref 136–145)
WBC # BLD: 4 K/UL (ref 4–11)

## 2020-07-06 PROCEDURE — 2580000003 HC RX 258: Performed by: ORTHOPAEDIC SURGERY

## 2020-07-06 PROCEDURE — 36591 DRAW BLOOD OFF VENOUS DEVICE: CPT

## 2020-07-06 PROCEDURE — 6370000000 HC RX 637 (ALT 250 FOR IP): Performed by: ORTHOPAEDIC SURGERY

## 2020-07-06 PROCEDURE — 6360000002 HC RX W HCPCS: Performed by: HOSPITALIST

## 2020-07-06 PROCEDURE — 83735 ASSAY OF MAGNESIUM: CPT

## 2020-07-06 PROCEDURE — 6370000000 HC RX 637 (ALT 250 FOR IP): Performed by: HOSPITALIST

## 2020-07-06 PROCEDURE — 6370000000 HC RX 637 (ALT 250 FOR IP): Performed by: INTERNAL MEDICINE

## 2020-07-06 PROCEDURE — 99239 HOSP IP/OBS DSCHRG MGMT >30: CPT | Performed by: INTERNAL MEDICINE

## 2020-07-06 PROCEDURE — 6370000000 HC RX 637 (ALT 250 FOR IP): Performed by: PHYSICIAN ASSISTANT

## 2020-07-06 PROCEDURE — 80048 BASIC METABOLIC PNL TOTAL CA: CPT

## 2020-07-06 PROCEDURE — 85025 COMPLETE CBC W/AUTO DIFF WBC: CPT

## 2020-07-06 PROCEDURE — U0002 COVID-19 LAB TEST NON-CDC: HCPCS

## 2020-07-06 RX ORDER — RISPERIDONE 0.25 MG/1
0.25 TABLET, FILM COATED ORAL NIGHTLY
Qty: 10 TABLET | Refills: 0
Start: 2020-07-06 | End: 2021-09-21

## 2020-07-06 RX ORDER — RISPERIDONE 0.25 MG/1
0.25 TABLET, FILM COATED ORAL NIGHTLY
Status: DISCONTINUED | OUTPATIENT
Start: 2020-07-06 | End: 2020-07-06 | Stop reason: HOSPADM

## 2020-07-06 RX ORDER — LOSARTAN POTASSIUM 100 MG/1
100 TABLET ORAL DAILY
Status: DISCONTINUED | OUTPATIENT
Start: 2020-07-06 | End: 2020-07-06 | Stop reason: HOSPADM

## 2020-07-06 RX ADMIN — FLECAINIDE ACETATE 50 MG: 100 TABLET ORAL at 09:09

## 2020-07-06 RX ADMIN — LOSARTAN POTASSIUM 100 MG: 100 TABLET, FILM COATED ORAL at 11:29

## 2020-07-06 RX ADMIN — PANTOPRAZOLE SODIUM 40 MG: 40 TABLET, DELAYED RELEASE ORAL at 05:18

## 2020-07-06 RX ADMIN — DOXAZOSIN 2 MG: 2 TABLET ORAL at 09:10

## 2020-07-06 RX ADMIN — METOPROLOL SUCCINATE 25 MG: 25 TABLET, EXTENDED RELEASE ORAL at 09:10

## 2020-07-06 RX ADMIN — ACETAMINOPHEN 650 MG: 325 TABLET ORAL at 09:10

## 2020-07-06 RX ADMIN — SENNOSIDES AND DOCUSATE SODIUM 1 TABLET: 8.6; 5 TABLET ORAL at 09:10

## 2020-07-06 RX ADMIN — ENOXAPARIN SODIUM 30 MG: 30 INJECTION SUBCUTANEOUS at 09:10

## 2020-07-06 RX ADMIN — TIMOLOL MALEATE 1 DROP: 5 SOLUTION/ DROPS OPHTHALMIC at 11:29

## 2020-07-06 RX ADMIN — Medication 10 ML: at 09:10

## 2020-07-06 ASSESSMENT — PAIN - FUNCTIONAL ASSESSMENT: PAIN_FUNCTIONAL_ASSESSMENT: PREVENTS OR INTERFERES SOME ACTIVE ACTIVITIES AND ADLS

## 2020-07-06 ASSESSMENT — PAIN DESCRIPTION - PROGRESSION: CLINICAL_PROGRESSION: GRADUALLY WORSENING

## 2020-07-06 ASSESSMENT — PAIN DESCRIPTION - ONSET: ONSET: ON-GOING

## 2020-07-06 ASSESSMENT — PAIN DESCRIPTION - PAIN TYPE: TYPE: ACUTE PAIN

## 2020-07-06 ASSESSMENT — PAIN SCALES - GENERAL
PAINLEVEL_OUTOF10: 0
PAINLEVEL_OUTOF10: 9

## 2020-07-06 ASSESSMENT — PAIN DESCRIPTION - DESCRIPTORS: DESCRIPTORS: ACHING

## 2020-07-06 ASSESSMENT — PAIN DESCRIPTION - ORIENTATION: ORIENTATION: MID

## 2020-07-06 ASSESSMENT — PAIN DESCRIPTION - FREQUENCY: FREQUENCY: CONTINUOUS

## 2020-07-06 ASSESSMENT — PAIN DESCRIPTION - LOCATION: LOCATION: HEAD

## 2020-07-06 NOTE — PROGRESS NOTES
Pt discharged. Reviewed discharge instructions with patient. Deny further questions regarding instructions. Port de-accessed per discharge order. Pt leaving in stable condition via squad and stretcher. Pt denies further needs. Belongings sent with patient. AVS sent as well as narcotic script. Report given to [facility].

## 2020-07-06 NOTE — PROGRESS NOTES
3. 2* 3.5    97* 95*   CO2 27 27 28   BUN 15 15 19   CREATININE 1.3* 1.0 1.1     CULTURES  None      RADIOLOGY  FLUORO FOR SURGICAL PROCEDURES   Final Result   Intraprocedural fluoroscopic spot images as above. See separate procedure   report for more information. CT HIP RIGHT WO CONTRAST   Final Result   1. Acute mildly impacted and rotated right femoral neck fracture. Normal   right hip alignment. 2. Abdominal and pelvic lymphadenopathy consistent with history of lymphoma. Images of a prior 2017 PET/CT could not be obtained at this time for   comparison. If they can be loaded into the system a comparison can be made   for interval change. Prior to signing the report to the 08/15/2017 PET/CT   became available for comparison. There is progressive lymphadenopathy   consistent with progressive lymphoma. CT Head WO Contrast   Final Result   Right frontal scalp injury without underlying fracture or acute intracranial   CT abnormality. XR TIBIA FIBULA RIGHT (2 VIEWS)   Final Result   No acute bony abnormality. XR HIP 2-3 VW W PELVIS RIGHT   Final Result   Subcapital right femoral neck fracture. Assessment/Plan:    Rt subcapital femoral neck  fracture   - s/p mechanical fall at home   - mildly impacted and rotated   - Orthopedics consulted: s/p percutaneous pinning of R femoral neck on 6/30 with Dr. Edna Ndiaye - POD 6,  pain meds to avoid drowsiness.  Appears to be very sensitive to meds  - PT/OT   - H/H with mild drop as expected --> continue to monitor     Delirium- hx of dementia   - likely 2/2 anesthesia/MANDY and prolonged admission   - Aggressive behavior which is very abnormal per   -  low dose risperdal nightly added and improved      Hypokalemia--resolved   - mild, 3.3  - sliding scale replacement    MANDY   - Baseline creatinine ~1.0  - Creatinine on admission 1.0-->1.8-->1.3-- 1.1  - likely 2/2 hypovolemia with hypotension and poor PO intake   - Hold ARB  - IVF and monitor   Resolved.    D/c IVF     TCP  - baseline ~120  - PLT on admission 119-->106-->91  - Chronic, stable     Hx of Follicular Lymphoma   - CT of R hip shows progressive lymphadenopathy concerning for progressive lymphoma   - Follows with Dr. Fred Robertson, had recent OP visit   - Has completed chemo in the past   - Will need OP follow up regarding new changes on CT-->discused with patient and , she did not want hem/onc consult during admission and would like to see her oncologist OP, reports no acute sx     HTN  - BP is uncontrolled - reports intolerance to several meds and BP is always high in 200s'  - Continue home BB, - significant drop post op , now improved  -restart ARB     GERD  - Continue PPI     Paroxysmal Atrial fibrillation   - not on AC sec to GI bleed   - Continue BB, flecainide    - rate is controlled     DVT Prophylaxis: Lovenox  Diet: DIET GENERAL;  Code Status: Full Code     Ot/pt  D/c to SNF       Meng Bennett MD 7/6/2020 9:12 AM

## 2020-07-06 NOTE — PLAN OF CARE
Problem: Falls - Risk of:  Goal: Will remain free from falls  Description: Will remain free from falls  7/5/2020 1311 by Dante Villegas RN  Outcome: Completed   Fall precautions in place, bed alarm on, nonskid foot wear applied, bed in lowest position, and call light within reach. Will continue to monitor. Problem: PAIN  Goal: Patient's pain/discomfort is manageable  7/5/2020 1311 by Dante Villegas RN  Outcome: Completed   Pt has Ultram and tylenol for pain control, pt is sleeping on the bed quietly, call light in reach. Problem: SKIN INTEGRITY  Goal: Skin integrity is maintained or improved  7/5/2020 1311 by Dante Villegas RN  Outcome: Completed   Pt has dressing on right hip. Dressing dry and intact.

## 2020-07-06 NOTE — CARE COORDINATION
DISCHARGE ORDER  Date/Time 2020 10:25 AM  Completed by: Robert Celis, Case Management    Patient Name: Nevin Rizo    : 1945    Admitting Diagnosis: Closed right hip fracture, initial encounter ([facility] Edmundo Smith    Financial Payer Type : [employer]  Admit order Date and Status: IP 2020  (verify MD's last order for status of admission/Traditional Medicare 3 day qualifying stay required for SNF)    Noted discharge order. Confirmed discharge plan : face to face with patient and spouse at bedside  Discharge Plan: STR    Pt is being d/c'd to [facility] today. Pt's O2 sats are 98% on RA. Discharge orders and Continuity of Care faxed to facility: Yes  Hospital Exemption Notification System complete: HENS  Transportation arranged: Yes - [facility]  Pick-up @ 13:30 am.  Patient / Family (pt and spouse at bedside) aware of  time: Yes   Nursing aware of  time: Yes: Latoya Baltazar RN  Receiving facility aware of  time: Yes: Areli Greco (intake)  Pre-cert obtained? Yes    Discharge timeout done with [facility] RN. All discharge needs and concerns addressed. Discharging nurse to complete DARREN, reconcile AVS, and place final copy with patient's discharge packet. Discharging RN to ensure that written prescriptions for  Level II medications are sent with patient to the facility as per protocol.

## 2020-07-06 NOTE — PROGRESS NOTES
Rapid Covid sent for nursing home re-admission. Per clinical and discharge planning okay to use rapid covid testing on patient.

## 2020-07-09 NOTE — DISCHARGE SUMMARY
Name:  Yvan Batista  Room:  0226/0226-01  MRN:    [id number]    Discharge Summary      This discharge summary is in conjunction with a complete physical exam done on the day of discharge. Attending Physician: ARSEN Jacobson. Discharging Physician: ARSEN Jacobson. Admit: 6/29/2020  Discharge:  7/6/2020    Diagnoses this Admission    Active Problems:    Hypertension, uncontrolled    Closed right hip fracture, initial encounter ([facility]    Dementia without behavioral disturbance (HCC)    MANDY (acute kidney injury) ([facility]  Resolved Problems:    * No resolved hospital problems. *          Procedures (Please Review Full Report for Details)         CT HIP RIGHT WO CONTRAST   Final Result   1. Acute mildly impacted and rotated right femoral neck fracture. Normal   right hip alignment. 2. Abdominal and pelvic lymphadenopathy consistent with history of lymphoma. Images of a prior 2017 PET/CT could not be obtained at this time for   comparison. If they can be loaded into the system a comparison can be made   for interval change. Prior to signing the report to the 08/15/2017 PET/CT   became available for comparison. There is progressive lymphadenopathy   consistent with progressive lymphoma.           CT Head WO Contrast   Final Result   Right frontal scalp injury without underlying fracture or acute intracranial   CT abnormality.           XR TIBIA FIBULA RIGHT (2 VIEWS)   Final Result   No acute bony abnormality.           XR HIP 2-3 VW W PELVIS RIGHT   Final Result   Subcapital right femoral neck fracture.             Consults    ortho    HPI:  66 y.o. female who presents following a fall while she was vacuum cleaning. She denies presyncopal sensation, palpitations, chest pain, LOC. She states she's been having increasing dizziness for the past year, having recently fallen a couple of weeks ago. She denies fever, chills, beth, leg edema.           Hospital Course  Rt subcapital femoral neck tablet  Commonly known as:  SENOKOT-S  Take 1 tablet by mouth 2 times daily        CONTINUE taking these medications    Benicar 40 MG tablet  Generic drug:  olmesartan     flecainide 50 MG tablet  Commonly known as:  TAMBOCOR     latanoprost 0.005 % ophthalmic solution  Commonly known as:  XALATAN     metoprolol succinate 50 MG extended release tablet  Commonly known as:  TOPROL XL     minoxidil 2.5 MG tablet  Commonly known as:  LONITEN  Take 1 tablet by mouth 2 times daily for 90 days. omeprazole 20 MG delayed release capsule  Commonly known as:  PRILOSEC     ondansetron 4 MG disintegrating tablet  Commonly known as:  Zofran ODT  Take 1 tablet by mouth every 8 hours as needed for Nausea or Vomiting     timolol 0.5 % ophthalmic gel-forming  Commonly known as:  TIMOPTIC-XE        STOP taking these medications    amitriptyline 10 MG tablet  Commonly known as:  ELAVIL     UNKNOWN TO PATIENT        ASK your doctor about these medications    traMADol 50 MG tablet  Commonly known as:  ULTRAM  Take 0.5-1 tablets by mouth every 6 hours as needed for Pain for up to 3 days. S/P HIP PERC PIN  Ask about: Should I take this medication? Where to Get Your Medications      You can get these medications from any pharmacy    Bring a paper prescription for each of these medications  · enoxaparin 40 MG/0.4ML injection  · traMADol 50 MG tablet     Information about where to get these medications is not yet available    Ask your nurse or doctor about these medications  · risperiDONE 0.25 MG tablet  · sennosides-docusate sodium 8.6-50 MG tablet           Discharge Condition/Location: Stable to SNF     Follow Up: Follow up with PCP. NEMESIO Jacobson Score 7/9/2020 2:44 PM

## 2020-08-07 ENCOUNTER — OFFICE VISIT (OUTPATIENT)
Dept: ORTHOPEDIC SURGERY | Age: 75
End: 2020-08-07

## 2020-08-07 VITALS — HEIGHT: 60 IN | BODY MASS INDEX: 21.79 KG/M2 | WEIGHT: 111 LBS

## 2020-08-07 PROCEDURE — 99024 POSTOP FOLLOW-UP VISIT: CPT | Performed by: ORTHOPAEDIC SURGERY

## 2020-08-07 NOTE — LETTER
[facility] [address] [place] [address]  Phone: [phone]  Fax: [address], DO        August 7, 2020    [address]      Dear Catarino: It is my medical opinion that [place] can continue progressing range of motion and gradually increase to 25% weight bare     If you have any questions or concerns, please don't hesitate to call.     Sincerely,        Ivana Jones, DO  Sports Medicine & Arthroscopic [address] [facility] partner of [place], [place]

## 2020-08-07 NOTE — PROGRESS NOTES
DIAGNOSIS:  Right femoral neck fracture, status post hip pinning     DATE OF SURGERY:  6/30/20 . HISTORY OF PRESENT ILLNESS: Ms. Cookie Cox 66 y.o. female  who came in today for 5 weeks postoperative visit. The patient denies any significant pain in the right hip. She has been working on ROM and non WB with  PT. No numbness or tingling sensation. No fever or Chills. PHYSICAL EXAMINATION:    Ht 5' (1.524 m)   Wt 111 lb (50.3 kg)   BMI 21.68 kg/m²     Pain Assessment:  Pain Assessment  Location of Pain: Pelvis  Location Modifiers: Right  Severity of Pain: 0  Duration of Pain: A few hours  Frequency of Pain: Intermittent  Aggravating Factors: Walking  Limiting Behavior: Yes  Relieving Factors: Rest  Result of Injury: Yes  Work-Related Injury: No  Are there other pain locations you wish to document?: No    Patient is awake, alert, and in no acute distress. The incision is healed well. No signs of any erythema or drainage. She has no pain with the active or passive range of motion of the right  hip. She has intact sensation to light touch distally, and is neurovascularly intact. IMAGING:  3 views right  Hip, and AP pelvis show anatomic alignment of the femoral neck fracture, hardware is in good position, no loosening, or hardware failure. IMPRESSION:    5 weeks status post right hip pinning       PLAN:    I have told the patient to work on ROM with  PT, 25% WB as well as strengthening exercises. The patient will come back for a follow up in 4 weeks. At that time, we will take Two views right hip, and AP pelvis. Ilsa Swanson        This dictation was performed with a verbal recognition program (DRAGON) and it was checked for errors. It is possible that there are still dictated errors within this office note. If so, please bring any errors to my attention for an addendum. All efforts were made to ensure that this office note is accurate.

## 2020-09-04 ENCOUNTER — OFFICE VISIT (OUTPATIENT)
Dept: ORTHOPEDIC SURGERY | Age: 75
End: 2020-09-04

## 2020-09-04 VITALS — HEIGHT: 60 IN | WEIGHT: 111 LBS | BODY MASS INDEX: 21.79 KG/M2

## 2020-09-04 PROCEDURE — 99024 POSTOP FOLLOW-UP VISIT: CPT | Performed by: ORTHOPAEDIC SURGERY

## 2020-09-04 NOTE — PROGRESS NOTES
DIAGNOSIS:  Right femoral neck fracture, status post hip pinning     DATE OF SURGERY:  20 . HISTORY OF PRESENT ILLNESS: Ms. Fernando Tad 66 y.o. female  who came in today for 9 weeks postoperative visit. The patient denies any significant pain in the right hip. She has been working on ROM and 25% WB with  PT. No numbness or tingling sensation. No fever or Chills. Here today in a wheelchair, family friend is with her. PHYSICAL EXAMINATION:    Ht 5' (1.524 m)   Wt 111 lb (50.3 kg)   BMI 21.68 kg/m²     Pain Assessment:       Patient is awake, alert, and in no acute distress. Incision is well-healed, no pain with active or passive motion of the hip.  4 out of 5 hip flexion. No pain with logroll. Distal neurovascular exam is intact. Patient is currently in a wheelchair. IMAGIN views right  Hip, and AP pelvis were obtained and reviewed today show anatomic alignment of the femoral neck fracture, hardware is in good position, no loosening, or hardware failure. Diagnosis Orders   1. Closed right hip fracture, initial encounter ([facility] [address]  XR HIP RIGHT (2-3 VIEWS)       IMPRESSION:    8 weeks status post right hip pinning       PLAN:    I have told the patient to work on ROM with  PT, 100% WB as well as strengthening exercises. The patient will come back for a follow up in 4 weeks. At that time, we will take Two views right hip, and AP pelvis. Perez Buckley        This dictation was performed with a verbal recognition program (DRAGON) and it was checked for errors. It is possible that there are still dictated errors within this office note. If so, please bring any errors to my attention for an addendum. All efforts were made to ensure that this office note is accurate.

## 2020-10-02 ENCOUNTER — OFFICE VISIT (OUTPATIENT)
Dept: ORTHOPEDIC SURGERY | Age: 75
End: 2020-10-02
Payer: MEDICARE

## 2020-10-02 VITALS — HEIGHT: 60 IN | WEIGHT: 110 LBS | BODY MASS INDEX: 21.6 KG/M2

## 2020-10-02 PROCEDURE — G8400 PT W/DXA NO RESULTS DOC: HCPCS | Performed by: ORTHOPAEDIC SURGERY

## 2020-10-02 PROCEDURE — 99213 OFFICE O/P EST LOW 20 MIN: CPT | Performed by: ORTHOPAEDIC SURGERY

## 2020-10-02 PROCEDURE — G8428 CUR MEDS NOT DOCUMENT: HCPCS | Performed by: ORTHOPAEDIC SURGERY

## 2020-10-02 PROCEDURE — 1123F ACP DISCUSS/DSCN MKR DOCD: CPT | Performed by: ORTHOPAEDIC SURGERY

## 2020-10-02 PROCEDURE — 1090F PRES/ABSN URINE INCON ASSESS: CPT | Performed by: ORTHOPAEDIC SURGERY

## 2020-10-02 PROCEDURE — G8484 FLU IMMUNIZE NO ADMIN: HCPCS | Performed by: ORTHOPAEDIC SURGERY

## 2020-10-02 PROCEDURE — 4040F PNEUMOC VAC/ADMIN/RCVD: CPT | Performed by: ORTHOPAEDIC SURGERY

## 2020-10-02 PROCEDURE — G8420 CALC BMI NORM PARAMETERS: HCPCS | Performed by: ORTHOPAEDIC SURGERY

## 2020-10-02 PROCEDURE — 1036F TOBACCO NON-USER: CPT | Performed by: ORTHOPAEDIC SURGERY

## 2020-10-02 NOTE — PROGRESS NOTES
DIAGNOSIS:  Right femoral neck fracture, status post hip pinning     DATE OF SURGERY:  20 . HISTORY OF PRESENT ILLNESS: Ms. Olu Flores 66 y.o. female  who came in today for 13 weeks postoperative visit. The patient denies any significant pain in the right hip. She has been working on ROM and 100% WB with  PT. states she is sore after physical therapy but denies significant pain. She is here today with her family member. Ambulating with a walker. PHYSICAL EXAMINATION:    Ht 5' (1.524 m)   Wt 110 lb (49.9 kg)   BMI 21.48 kg/m²     Pain Assessment:  Pain Assessment  Location of Pain: Other (Comment)  Location Modifiers: Right  Severity of Pain: 1  Quality of Pain: Dull, Aching  Duration of Pain: A few minutes  Frequency of Pain: Intermittent  Limiting Behavior: Some  Relieving Factors: Rest, Ice  Result of Injury: No  Work-Related Injury: No  Are there other pain locations you wish to document?: No    Patient is awake, alert, and in no acute distress. Incision is well-healed, no pain with logroll, flexion or abduction at the hip. Patient has 4+ out of 5 strength with flexion, abduction and adduction. Ambulating with a walker. Distal neurovascular exams intact. IMAGIN views right  Hip, and AP pelvis were obtained and reviewed today show anatomic alignment of the femoral neck fracture, hardware is in good position, no loosening, or hardware failure. Diagnosis Orders   1. Closed right hip fracture, initial encounter ([facility] [address]  XR HIP 2-3 VW W PELVIS RIGHT       IMPRESSION:    12 weeks status post right hip pinning       PLAN:    She is doing well, continue to progress all activities as tolerated, follow-up with me on an as-needed basis. Patient agrees with this plan, all of their questions were answered best of our ability and to their satisfaction. Mine Wilson        This dictation was performed with a verbal recognition program (DRAGON) and it was checked for errors.  It is possible that there are still dictated errors within this office note. If so, please bring any errors to my attention for an addendum. All efforts were made to ensure that this office note is accurate.

## 2021-03-17 NOTE — PROGRESS NOTES
See PM shift assessment. Patient is confused; she knows that she fell and had surgery. Talking to family on the telephone right now; she is laughing. Family states that patient is slow to recover from anesthesia. Speech is clear. Call light in reach. Patient is a 36y y.o. F now PPD #2 s/p  c/b gHTN.    NAEO. Patient was evaluated at bedside this AM. Pain is well-controlled with PO pain medications. She has been ambulating without difficulty and voiding spontaneously. She endorses decreasing vaginal bleeding.    Vital Signs Last 24 Hrs  T(C): 36.9 (17 Mar 2021 01:54), Max: 36.9 (16 Mar 2021 10:00)  T(F): 98.5 (17 Mar 2021 01:54), Max: 98.5 (17 Mar 2021 01:54)  HR: 88 (17 Mar 2021 01:54) (79 - 88)  BP: 132/87 (17 Mar 2021 01:54) (100/65 - 132/87)  BP(mean): --  RR: 17 (17 Mar 2021 01:54) (17 - 19)  SpO2: 99% (17 Mar 2021 01:54) (97% - 99%)    Physical Exam  Gen: Well-appearing. No acute distress. Resting comfortably in bed.  Resp: Breathing comfortably on RA.  Abd: Soft, non-tender, non-distended. Uterus firm at umbilicus.  Extremities: No calf tenderness.    Labs                    03-15-21 @ 07:  -  21 @ 07:00  --------------------------------------------------------  IN: 0 mL / OUT: 2200 mL / NET: -2200 mL    21 @ 07:01  -  21 @ 06:27  --------------------------------------------------------  IN: 0 mL / OUT: 550 mL / NET: -550 mL       Patient is a 36y y.o. F now PPD #2 s/p  c/b gHTN.    NAEO. Patient was evaluated at bedside this AM. Pain is well-controlled with PO pain medications. She has been ambulating without difficulty and voiding spontaneously. She endorses decreasing vaginal bleeding.    Vital Signs Last 24 Hrs  T(C): 36.9 (17 Mar 2021 01:54), Max: 36.9 (16 Mar 2021 10:00)  T(F): 98.5 (17 Mar 2021 01:54), Max: 98.5 (17 Mar 2021 01:54)  HR: 88 (17 Mar 2021 01:54) (79 - 88)  BP: 132/87 (17 Mar 2021 01:54) (100/65 - 132/87)  BP(mean): --  RR: 17 (17 Mar 2021 01:54) (17 - 19)  SpO2: 99% (17 Mar 2021 01:54) (97% - 99%)    Physical Exam  Gen: Well-appearing. No acute distress. Resting comfortably in bed.  Resp: Breathing comfortably on RA.  Abd: Soft, non-tender, non-distended. Uterus firm at umbilicus.  Extremities: No calf tenderness. 2+ edema    Labs                    03-15-21 @ 07:01  -  21 @ 07:00  --------------------------------------------------------  IN: 0 mL / OUT: 2200 mL / NET: -2200 mL    21 @ 07:01  -  21 @ 06:27  --------------------------------------------------------  IN: 0 mL / OUT: 550 mL / NET: -550 mL

## 2021-09-20 ENCOUNTER — APPOINTMENT (OUTPATIENT)
Dept: GENERAL RADIOLOGY | Age: 76
DRG: 057 | End: 2021-09-20
Payer: MEDICARE

## 2021-09-20 ENCOUNTER — HOSPITAL ENCOUNTER (INPATIENT)
Age: 76
LOS: 2 days | Discharge: NURSING, REHAB OR LONG-TERM CARE | DRG: 057 | End: 2021-09-23
Attending: EMERGENCY MEDICINE | Admitting: HOSPITALIST
Payer: MEDICARE

## 2021-09-20 ENCOUNTER — APPOINTMENT (OUTPATIENT)
Dept: CT IMAGING | Age: 76
DRG: 057 | End: 2021-09-20
Payer: MEDICARE

## 2021-09-20 DIAGNOSIS — R41.82 ALTERED MENTAL STATUS, UNSPECIFIED ALTERED MENTAL STATUS TYPE: Primary | ICD-10-CM

## 2021-09-20 DIAGNOSIS — N30.00 ACUTE CYSTITIS WITHOUT HEMATURIA: ICD-10-CM

## 2021-09-20 PROCEDURE — 70450 CT HEAD/BRAIN W/O DYE: CPT

## 2021-09-20 PROCEDURE — 93005 ELECTROCARDIOGRAM TRACING: CPT | Performed by: PHYSICIAN ASSISTANT

## 2021-09-20 PROCEDURE — 71045 X-RAY EXAM CHEST 1 VIEW: CPT

## 2021-09-20 PROCEDURE — 99283 EMERGENCY DEPT VISIT LOW MDM: CPT

## 2021-09-20 NOTE — ED PROVIDER NOTES
[facility] 48        Pt Name: Milvia Jurado  MRN: [id number]  [place] 1945  Date of evaluation: 9/20/2021  Provider: Juanjose Katz PA-C  PCP: Barb Ayala MD  Note Started: 7:05 PM EDT        I have seen and evaluated this patient with my supervising physician Chloé Parham, *. CHIEF COMPLAINT       Chief Complaint   Patient presents with    Altered Mental Status     pt family states pt is early dementia, increased confusion over the last week. pt is not able walk or care for self over last week. Pt was sent here by PCP       HISTORY OF PRESENT ILLNESS   (Location, Timing/Onset, Context/Setting, Quality, Duration, Modifying Factors, Severity, Associated Signs and Symptoms)  Note limiting factors. Chief Complaint: Altered mental status, decline in ADLs    Milvia Jurado is a 64 y.o. female who presents with , who is blind and son. This patient reported to have had a GI illness about 1.5 weeks ago. She was at baseline prior to the onset. Following GI symptoms she has had a very clear decline in her mental status, awareness, and ability to ambulate, ability to perform ADLs. She does exhibit confusion.  says for example that he went to give her pills and she normally would take them from his hand swallow them of the drink of water. More recently she looks at the pills does not know what to do with them and finally puts in her mouth and begins to chew them. He does try to pry her mouth open to get the pills out or tries to give her some water. She did contact PCP office yesterday who recommended if not better by this morning to see PCP Dr. Charlotte Zapata. She was seen by Dr. Charlotte Zapata. Dr. Charlotte Zapata did recommend ED evaluation probable admission for AMS and severe decline in ADLs. She does have some baseline dementia. I do not suspect dementia but progress as quickly as relayed to me by  and son.  The patient has multiple allergies has not had Covid vaccination. I will check a Covid test.    The patient does have a port left chest. I do believe in place due to hard IV stick. Patient with history hypertension. BP initially at 186/103. Nursing Notes were all reviewed and agreed with or any disagreements were addressed in the HPI. REVIEW OF SYSTEMS    (2-9 systems for level 4, 10 or more for level 5)     Review of Systems    Positives and Pertinent negatives as per HPI. Except as noted above in the ROS, all other systems were reviewed and negative.        PAST MEDICAL HISTORY     Past Medical History:   Diagnosis Date    Abnormal LFT's     Arthritis     Atrial fibrillation ([facility] [address]     Follicular lymphoma (HCC)     Dr Erendira Garcias GERD (gastroesophageal reflux disease)     Glaucoma     Gout 11/2/2010    Hypertension     Liver disease     steatohepatitis    Migraine     Non-ulcer dyspepsia     egd neg 11/2011    Peripheral vascular disease ([facility] [address]     Thyroid disease          SURGICAL HISTORY     Past Surgical History:   Procedure Laterality Date    APPENDECTOMY      CATARACT REMOVAL      2007    CHOLECYSTECTOMY      2005    COLONOSCOPY  2/27/2015    polyp    ENDOSCOPY, COLON, DIAGNOSTIC  11/14/2011    bx stomach polyp    HIP SURGERY Right 06/30/2020    RIGHT PERCUTANEOUS HIP PINNING (Right Hip) w./ Dr Yessenia Emmanuel 6/30/20    HIP SURGERY Right 6/30/2020    RIGHT PERCUTANEOUS HIP PINNING performed by Bayron Tarango DO at [facility]      THYROIDECTOMY      76    TONSILLECTOMY      UPPER GASTROINTESTINAL ENDOSCOPY      UPPER GASTROINTESTINAL ENDOSCOPY  2/27/2015    gastric polyp         CURRENTMEDICATIONS       Current Discharge Medication List      CONTINUE these medications which have NOT CHANGED    Details   brimonidine (ALPHAGAN P) 0.15 % ophthalmic solution Place 1 drop into both eyes 2 times daily Med listed as \"[place] 0.15%\" on Pt's home meds list. Pt's son believes that is the correct eye drop. amitriptyline (ELAVIL) 10 MG tablet Take 10 mg by mouth nightly      flecainide (TAMBOCOR) 50 MG tablet Take 50 mg by mouth 2 times daily      ondansetron (ZOFRAN ODT) 4 MG disintegrating tablet Take 1 tablet by mouth every 8 hours as needed for Nausea or Vomiting  Qty: 12 tablet, Refills: 0      minoxidil (LONITEN) 2.5 MG tablet Take 1 tablet by mouth 2 times daily for 90 days. Qty: 60 tablet, Refills: 2      metoprolol (TOPROL-XL) 50 MG XL tablet Take 25 mg by mouth daily. olmesartan (BENICAR) 40 MG tablet Take 40 mg by mouth daily. omeprazole (PRILOSEC) 20 MG capsule Take 40 mg by mouth daily. timolol (TIMOPTIC-XR) 0.5 % ophthalmic gel-forming Place 1 drop into both eyes 2 times daily.                  ALLERGIES     Latex, Ferric carboxymaltose, Aldactone [spironolactone], Baclofen, Cardizem [diltiazem hcl], Dilaudid [hydromorphone hcl], Droperidol, Guanfacine hcl, Hydrochlorothiazide, Ibuprofen, Lasix [furosemide], Lorazepam, Nexium [esomeprazole magnesium trihydrate], Nortriptyline, Other, Phenergan [promethazine hcl], Phenergan [promethazine hcl], Phenothiazines, Pilocarpine, Prochlorperazine edisylate, Reglan [metoclopramide hcl], Rofecoxib, Triavil [perphenazine-amitriptyline], Ultracet [tramadol-acetaminophen], Valium, Vioxx, Zofran odt [ondansetron], Amlodipine, Amoxicillin, Augmentin [amoxicillin-pot clavulanate], Avelox [moxifloxacin], Bactrim [sulfamethoxazole-trimethoprim], Biaxin [clarithromycin], Cephalexin, Clonidine derivatives, Codeine, Coreg [carvedilol], Demerol, Hydralazine, Levaquin [levofloxacin in d5w], Maxalt [rizatriptan benzoate], Morphine and related, Scopolamine, and Zonisamide    FAMILYHISTORY       Family History   Problem Relation Age of Onset    Heart Disease Mother     Cancer Sister     Cancer Brother           SOCIAL HISTORY       Social History     Tobacco Use    Smoking status: Never Smoker    Smokeless tobacco: Never Used   Vaping Use    Vaping Use: Never used   Substance Use Topics    Alcohol use: No    Drug use: No       SCREENINGS    Romeo Coma Scale  Eye Opening: Spontaneous  Best Verbal Response: Confused  Best Motor Response: Obeys commands  Abigail Coma Scale Score: 14        PHYSICAL EXAM    (up to 7 for level 4, 8 or more for level 5)     ED Triage Vitals [09/20/21 1807]   BP Temp Temp Source Pulse Resp SpO2 Height Weight   (!) 186/103 97.6 °F (36.4 °C) Tympanic 88 16 98 % 5' 3\" (1.6 m) 114 lb (51.7 kg)       Physical Exam  Vitals and nursing note reviewed. Constitutional:       Appearance: Normal appearance. She is well-developed and normal weight. HENT:      Head: Normocephalic and atraumatic. Right Ear: External ear normal. There is impacted cerumen. Left Ear: External ear normal. There is impacted cerumen. Eyes:      General: No scleral icterus. Right eye: No discharge. Left eye: No discharge. Extraocular Movements: Extraocular movements intact. Conjunctiva/sclera: Conjunctivae normal.      Pupils: Pupils are equal, round, and reactive to light. Cardiovascular:      Rate and Rhythm: Normal rate and regular rhythm. Heart sounds: Normal heart sounds. Pulmonary:      Effort: Pulmonary effort is normal.      Breath sounds: Normal breath sounds. Abdominal:      General: Abdomen is flat. Bowel sounds are normal.      Palpations: Abdomen is soft. Tenderness: There is no abdominal tenderness. Musculoskeletal:         General: Normal range of motion. Cervical back: Normal range of motion and neck supple. Right lower leg: No edema. Left lower leg: No edema. Skin:     General: Skin is warm and dry. Neurological:      General: No focal deficit present. Mental Status: She is alert.    Psychiatric:         Mood and Affect: Mood normal.         Behavior: Behavior normal.         DIAGNOSTIC RESULTS   LABS:    Labs Reviewed   PROCALCITONIN - Abnormal; Notable for the following components:       Result Value    Procalcitonin 0.48 (*)     All other components within normal limits    Narrative:     Performed at:  [facility],  [place]   Phone [phone]   CBC WITH AUTO DIFFERENTIAL - Abnormal; Notable for the following components:    WBC 11.8 (*)     Neutrophils Absolute 9.2 (*)     All other components within normal limits    Narrative:     Performed at:  [facility],  [place]   Phone [phone]   COMPREHENSIVE METABOLIC PANEL - Abnormal; Notable for the following components:    Anion Gap 18 (*)     Glucose 121 (*)     BUN 24 (*)     GFR Non- 48 (*)     GFR African American 58 (*)     Albumin/Globulin Ratio 1.0 (*)     Alkaline Phosphatase 157 (*)     ALT 9 (*)     All other components within normal limits    Narrative:     Performed at:  [facility],  [place]   Phone [phone]   BRAIN NATRIURETIC PEPTIDE - Abnormal; Notable for the following components:    Pro-BNP 3,447 (*)     All other components within normal limits    Narrative:     Performed at:  [facility],  [place]   Phone [phone] - Abnormal; Notable for the following components:    Protime 13.0 (*)     INR 1.14 (*)     All other components within normal limits    Narrative:     Performed at:  [facility],  [place]   Phone [phone]   URINE RT REFLEX TO CULTURE - Abnormal; Notable for the following components:    Bilirubin Urine MODERATE (*)     Ketones, Urine 15 (*)     Blood, Urine SMALL (*)     Protein, UA 30 (*)     Leukocyte Esterase, Urine TRACE (*)     All other components within normal limits    Narrative:     Performed at:  [facility]) - [facility],  [place], [facility]   Phone [phone]   MICROSCOPIC URINALYSIS - Abnormal; Notable for the following components:    WBC, UA 6-9 (*)     Renal Epithelial, UA 2-5 (*)     Bacteria, UA 1+ (*)     All other components within normal limits    Narrative:     Performed at:  [facility],  [place], [facility]   Phone [phone]    Narrative:     Performed at:  [place] [address],  [place], [facility]   Phone [phone]   CULTURE, BLOOD 1   CULTURE, BLOOD 2   CULTURE, URINE   MAGNESIUM    Narrative:     Performed at:  [facility] [address],  [place], [facility]   Phone [phone]   LIPASE    Narrative:     Performed at:  [place] [address],  [place], [facility]   Phone [phone]   TROPONIN    Narrative:     Performed at:  [facility]) - [facility],  [place], [facility]   Phone [phone]       When ordered only abnormal lab results are displayed. All other labs were within normal range or not returned as of this dictation. EKG: When ordered, EKG's are interpreted by the Emergency Department Physician in the absence of a cardiologist.  Please see their note for interpretation of EKG. RADIOLOGY:   Non-plain film images such as CT, Ultrasound and MRI are read by the radiologist. Plain radiographic images are visualized and preliminarily interpreted by the ED Provider with the below findings:        Interpretation per the Radiologist below, if available at the time of this note:    XR HIP LEFT (2-3 VIEWS)   Final Result   Negative radiographs of left hip. XR CHEST PORTABLE   Final Result   No acute process. CT Head WO Contrast   Final Result   No acute intracranial abnormality.       Chronic right basal ganglia and right corona radiata. Please note that MRI would be more sensitive for evaluation of acute infarction. XR CHEST PORTABLE    Result Date: 9/20/2021  EXAMINATION: ONE XRAY VIEW OF THE CHEST 9/20/2021 6:51 pm COMPARISON: 04/01/2019 HISTORY: ORDERING SYSTEM PROVIDED HISTORY: Altered mental status TECHNOLOGIST PROVIDED HISTORY: Reason for exam:->Altered mental status Reason for Exam: AMS Acuity: Acute Type of Exam: Initial FINDINGS: Left-sided central venous catheter remains in place. The lungs are without acute focal process. There is no effusion or pneumothorax. The cardiomediastinal silhouette is stable. The osseous structures are stable. No acute process.            PROCEDURES   Unless otherwise noted below, none     Procedures    CRITICAL CARE TIME   N/A    CONSULTS:  IP CONSULT TO HOSPITALIST  IP CONSULT TO PSYCHIATRY      EMERGENCY DEPARTMENT COURSE and DIFFERENTIAL DIAGNOSIS/MDM:   Vitals:    Vitals:    09/21/21 0705 09/21/21 0815 09/21/21 0922 09/21/21 1418   BP: (!) 225/125 (!) 225/101 (!) 195/88 (!) 230/115   Pulse: 94  92 93   Resp: 18   18   Temp: 98.9 °F (37.2 °C)   99 °F (37.2 °C)   TempSrc: Oral   Oral   SpO2: 98%   95%   Weight:       Height:           Patient was given the following medications:  Medications   amitriptyline (ELAVIL) tablet 10 mg (has no administration in time range)   brimonidine (ALPHAGAN) 0.2 % ophthalmic solution 1 drop (1 drop Both Eyes Given 9/21/21 0827)   flecainide (TAMBOCOR) tablet 50 mg (50 mg Oral Given 9/21/21 0827)   metoprolol succinate (TOPROL XL) extended release tablet 25 mg (25 mg Oral Given 9/21/21 0827)   minoxidil (LONITEN) tablet 2.5 mg (2.5 mg Oral Given 9/21/21 0827)   losartan (COZAAR) tablet 100 mg (100 mg Oral Given 9/21/21 0827)   pantoprazole (PROTONIX) tablet 40 mg (40 mg Oral Given 9/21/21 0604)   timolol (TIMOPTIC) 0.5 % ophthalmic solution 1 drop (1 drop Both Eyes Given 9/21/21 0827)   sodium chloride flush 0.9 % injection 5-40 mL (10 mLs IntraVENous Given 9/21/21 0828)   sodium chloride flush 0.9 % injection 5-40 mL (has no administration in time range)   0.9 % sodium chloride infusion (has no administration in time range)   enoxaparin (LOVENOX) injection 40 mg (40 mg SubCUTAneous Given 9/21/21 0827)   ondansetron (ZOFRAN-ODT) disintegrating tablet 4 mg (has no administration in time range)   polyethylene glycol (GLYCOLAX) packet 17 g (has no administration in time range)   acetaminophen (TYLENOL) tablet 650 mg (has no administration in time range)     Or   acetaminophen (TYLENOL) suppository 650 mg (has no administration in time range)   0.9 % sodium chloride infusion ( IntraVENous New Bag 9/21/21 0606)   levoFLOXacin (LEVAQUIN) 500 MG/100ML infusion 500 mg (0 mg IntraVENous Stopped 9/21/21 0736)   metoprolol succinate (TOPROL XL) extended release tablet 50 mg (50 mg Oral Given 9/21/21 0213)   minoxidil (LONITEN) tablet 2.5 mg (2.5 mg Oral Given 9/21/21 0213)   0.9 % sodium chloride bolus (0 mLs IntraVENous Stopped 9/21/21 0310)   nitrofurantoin (macrocrystal-monohydrate) (MACROBID) capsule 100 mg (100 mg Oral Given 9/21/21 0309)           My shift ends and the final disposition of this patient is managed by my attending physician. FINAL IMPRESSION      1. Altered mental status, unspecified altered mental status type    2. Acute cystitis without hematuria          DISPOSITION/PLAN   DISPOSITION Admitted 09/21/2021 04:35:53 AM      PATIENT REFERRED TO:  No follow-up provider specified.     DISCHARGE MEDICATIONS:  Current Discharge Medication List          DISCONTINUED MEDICATIONS:  Current Discharge Medication List      STOP taking these medications       risperiDONE (RISPERDAL) 0.25 MG tablet Comments:   Reason for Stopping:         sennosides-docusate sodium (SENOKOT-S) 8.6-50 MG tablet Comments:   Reason for Stopping:         enoxaparin (LOVENOX) 40 MG/0.4ML injection Comments:   Reason for Stopping: latanoprost (XALATAN) 0.005 % ophthalmic solution Comments:   Reason for Stopping:                      (Please note that portions of this note were completed with a voice recognition program.  Efforts were made to edit the dictations but occasionally words are mis-transcribed. )    Delaney Morales PA-C (electronically signed)           Delaney Morales PA-C  09/21/21 9691

## 2021-09-21 ENCOUNTER — APPOINTMENT (OUTPATIENT)
Dept: GENERAL RADIOLOGY | Age: 76
DRG: 057 | End: 2021-09-21
Payer: MEDICARE

## 2021-09-21 PROBLEM — F02.818 LATE ONSET ALZHEIMER'S DEMENTIA WITH BEHAVIORAL DISTURBANCE (HCC): Status: ACTIVE | Noted: 2021-09-21

## 2021-09-21 PROBLEM — N39.0 UTI (URINARY TRACT INFECTION): Status: ACTIVE | Noted: 2021-09-21

## 2021-09-21 PROBLEM — G30.1 LATE ONSET ALZHEIMER'S DEMENTIA WITH BEHAVIORAL DISTURBANCE (HCC): Status: ACTIVE | Noted: 2021-09-21

## 2021-09-21 PROBLEM — R41.82 ALTERED MENTAL STATUS, UNSPECIFIED: Status: ACTIVE | Noted: 2021-09-21

## 2021-09-21 LAB
A/G RATIO: 1 (ref 1.1–2.2)
ALBUMIN SERPL-MCNC: 3.9 G/DL (ref 3.4–5)
ALP BLD-CCNC: 157 U/L (ref 40–129)
ALT SERPL-CCNC: 9 U/L (ref 10–40)
ANION GAP SERPL CALCULATED.3IONS-SCNC: 18 MMOL/L (ref 3–16)
AST SERPL-CCNC: 18 U/L (ref 15–37)
BACTERIA: ABNORMAL /HPF
BASOPHILS ABSOLUTE: 0.1 K/UL (ref 0–0.2)
BASOPHILS RELATIVE PERCENT: 1 %
BILIRUB SERPL-MCNC: 0.8 MG/DL (ref 0–1)
BILIRUBIN URINE: ABNORMAL
BLOOD, URINE: ABNORMAL
BUN BLDV-MCNC: 24 MG/DL (ref 7–20)
CALCIUM SERPL-MCNC: 9.8 MG/DL (ref 8.3–10.6)
CHLORIDE BLD-SCNC: 100 MMOL/L (ref 99–110)
CLARITY: CLEAR
CO2: 22 MMOL/L (ref 21–32)
COLOR: YELLOW
CREAT SERPL-MCNC: 1.1 MG/DL (ref 0.6–1.2)
EOSINOPHILS ABSOLUTE: 0 K/UL (ref 0–0.6)
EOSINOPHILS RELATIVE PERCENT: 0.1 %
EPITHELIAL CELLS, UA: ABNORMAL /HPF (ref 0–5)
GFR AFRICAN AMERICAN: 58
GFR NON-AFRICAN AMERICAN: 48
GLOBULIN: 3.8 G/DL
GLUCOSE BLD-MCNC: 111 MG/DL (ref 70–99)
GLUCOSE BLD-MCNC: 121 MG/DL (ref 70–99)
GLUCOSE URINE: NEGATIVE MG/DL
HCT VFR BLD CALC: 39.1 % (ref 36–48)
HEMOGLOBIN: 13.6 G/DL (ref 12–16)
INFLUENZA A: NOT DETECTED
INFLUENZA B: NOT DETECTED
INR BLD: 1.14 (ref 0.88–1.12)
KETONES, URINE: 15 MG/DL
LEUKOCYTE ESTERASE, URINE: ABNORMAL
LIPASE: 15 U/L (ref 13–60)
LYMPHOCYTES ABSOLUTE: 1.3 K/UL (ref 1–5.1)
LYMPHOCYTES RELATIVE PERCENT: 10.6 %
MAGNESIUM: 1.9 MG/DL (ref 1.8–2.4)
MCH RBC QN AUTO: 31.5 PG (ref 26–34)
MCHC RBC AUTO-ENTMCNC: 34.8 G/DL (ref 31–36)
MCV RBC AUTO: 90.6 FL (ref 80–100)
MICROSCOPIC EXAMINATION: YES
MONOCYTES ABSOLUTE: 1.3 K/UL (ref 0–1.3)
MONOCYTES RELATIVE PERCENT: 10.7 %
NEUTROPHILS ABSOLUTE: 9.2 K/UL (ref 1.7–7.7)
NEUTROPHILS RELATIVE PERCENT: 77.6 %
NITRITE, URINE: NEGATIVE
PDW BLD-RTO: 13.7 % (ref 12.4–15.4)
PERFORMED ON: ABNORMAL
PH UA: 5.5 (ref 5–8)
PLATELET # BLD: 158 K/UL (ref 135–450)
PMV BLD AUTO: 9.8 FL (ref 5–10.5)
POTASSIUM SERPL-SCNC: 3.7 MMOL/L (ref 3.5–5.1)
PRO-BNP: 3447 PG/ML (ref 0–449)
PROCALCITONIN: 0.48 NG/ML (ref 0–0.15)
PROTEIN UA: 30 MG/DL
PROTHROMBIN TIME: 13 SEC (ref 9.9–12.7)
RBC # BLD: 4.32 M/UL (ref 4–5.2)
RBC UA: ABNORMAL /HPF (ref 0–4)
RENAL EPITHELIAL, UA: ABNORMAL /HPF (ref 0–1)
SARS-COV-2 RNA, RT PCR: NOT DETECTED
SODIUM BLD-SCNC: 140 MMOL/L (ref 136–145)
SPECIFIC GRAVITY UA: 1.02 (ref 1–1.03)
TOTAL PROTEIN: 7.7 G/DL (ref 6.4–8.2)
TROPONIN: <0.01 NG/ML
URINE REFLEX TO CULTURE: ABNORMAL
URINE TYPE: ABNORMAL
UROBILINOGEN, URINE: 0.2 E.U./DL
WBC # BLD: 11.8 K/UL (ref 4–11)
WBC UA: ABNORMAL /HPF (ref 0–5)

## 2021-09-21 PROCEDURE — 97530 THERAPEUTIC ACTIVITIES: CPT

## 2021-09-21 PROCEDURE — 80053 COMPREHEN METABOLIC PANEL: CPT

## 2021-09-21 PROCEDURE — 83735 ASSAY OF MAGNESIUM: CPT

## 2021-09-21 PROCEDURE — 2580000003 HC RX 258: Performed by: STUDENT IN AN ORGANIZED HEALTH CARE EDUCATION/TRAINING PROGRAM

## 2021-09-21 PROCEDURE — 84145 PROCALCITONIN (PCT): CPT

## 2021-09-21 PROCEDURE — 99223 1ST HOSP IP/OBS HIGH 75: CPT | Performed by: INTERNAL MEDICINE

## 2021-09-21 PROCEDURE — 87040 BLOOD CULTURE FOR BACTERIA: CPT

## 2021-09-21 PROCEDURE — 97535 SELF CARE MNGMENT TRAINING: CPT

## 2021-09-21 PROCEDURE — 1200000000 HC SEMI PRIVATE

## 2021-09-21 PROCEDURE — 97166 OT EVAL MOD COMPLEX 45 MIN: CPT

## 2021-09-21 PROCEDURE — 6360000002 HC RX W HCPCS: Performed by: HOSPITALIST

## 2021-09-21 PROCEDURE — 87636 SARSCOV2 & INF A&B AMP PRB: CPT

## 2021-09-21 PROCEDURE — 6370000000 HC RX 637 (ALT 250 FOR IP): Performed by: HOSPITALIST

## 2021-09-21 PROCEDURE — 83690 ASSAY OF LIPASE: CPT

## 2021-09-21 PROCEDURE — 87086 URINE CULTURE/COLONY COUNT: CPT

## 2021-09-21 PROCEDURE — 85610 PROTHROMBIN TIME: CPT

## 2021-09-21 PROCEDURE — 85025 COMPLETE CBC W/AUTO DIFF WBC: CPT

## 2021-09-21 PROCEDURE — 2580000003 HC RX 258: Performed by: HOSPITALIST

## 2021-09-21 PROCEDURE — 81001 URINALYSIS AUTO W/SCOPE: CPT

## 2021-09-21 PROCEDURE — 84484 ASSAY OF TROPONIN QUANT: CPT

## 2021-09-21 PROCEDURE — 73502 X-RAY EXAM HIP UNI 2-3 VIEWS: CPT

## 2021-09-21 PROCEDURE — 6370000000 HC RX 637 (ALT 250 FOR IP): Performed by: STUDENT IN AN ORGANIZED HEALTH CARE EDUCATION/TRAINING PROGRAM

## 2021-09-21 PROCEDURE — 83880 ASSAY OF NATRIURETIC PEPTIDE: CPT

## 2021-09-21 PROCEDURE — 97162 PT EVAL MOD COMPLEX 30 MIN: CPT

## 2021-09-21 RX ORDER — MINOXIDIL 2.5 MG/1
2.5 TABLET ORAL 2 TIMES DAILY
Status: DISCONTINUED | OUTPATIENT
Start: 2021-09-21 | End: 2021-09-23 | Stop reason: HOSPADM

## 2021-09-21 RX ORDER — AMITRIPTYLINE HYDROCHLORIDE 10 MG/1
10 TABLET, FILM COATED ORAL NIGHTLY
Status: ON HOLD | COMMUNITY
End: 2021-09-22 | Stop reason: HOSPADM

## 2021-09-21 RX ORDER — ACETAMINOPHEN 650 MG/1
650 SUPPOSITORY RECTAL EVERY 6 HOURS PRN
Status: DISCONTINUED | OUTPATIENT
Start: 2021-09-21 | End: 2021-09-23 | Stop reason: HOSPADM

## 2021-09-21 RX ORDER — AMITRIPTYLINE HYDROCHLORIDE 10 MG/1
10 TABLET, FILM COATED ORAL NIGHTLY
Status: DISCONTINUED | OUTPATIENT
Start: 2021-09-21 | End: 2021-09-23 | Stop reason: HOSPADM

## 2021-09-21 RX ORDER — ONDANSETRON 4 MG/1
4 TABLET, ORALLY DISINTEGRATING ORAL EVERY 8 HOURS PRN
Status: DISCONTINUED | OUTPATIENT
Start: 2021-09-21 | End: 2021-09-23 | Stop reason: HOSPADM

## 2021-09-21 RX ORDER — POLYETHYLENE GLYCOL 3350 17 G/17G
17 POWDER, FOR SOLUTION ORAL DAILY PRN
Status: DISCONTINUED | OUTPATIENT
Start: 2021-09-21 | End: 2021-09-23 | Stop reason: HOSPADM

## 2021-09-21 RX ORDER — BRIMONIDINE TARTRATE 0.15 %
1 DROPS OPHTHALMIC (EYE) 2 TIMES DAILY
COMMUNITY

## 2021-09-21 RX ORDER — MINOXIDIL 2.5 MG/1
2.5 TABLET ORAL ONCE
Status: COMPLETED | OUTPATIENT
Start: 2021-09-21 | End: 2021-09-21

## 2021-09-21 RX ORDER — SODIUM CHLORIDE 9 MG/ML
25 INJECTION, SOLUTION INTRAVENOUS PRN
Status: DISCONTINUED | OUTPATIENT
Start: 2021-09-21 | End: 2021-09-23 | Stop reason: HOSPADM

## 2021-09-21 RX ORDER — TIMOLOL MALEATE 5 MG/ML
1 SOLUTION/ DROPS OPHTHALMIC 2 TIMES DAILY
Status: DISCONTINUED | OUTPATIENT
Start: 2021-09-21 | End: 2021-09-23 | Stop reason: HOSPADM

## 2021-09-21 RX ORDER — BRIMONIDINE TARTRATE 2 MG/ML
1 SOLUTION/ DROPS OPHTHALMIC 2 TIMES DAILY
Status: DISCONTINUED | OUTPATIENT
Start: 2021-09-21 | End: 2021-09-23 | Stop reason: HOSPADM

## 2021-09-21 RX ORDER — SODIUM CHLORIDE 0.9 % (FLUSH) 0.9 %
5-40 SYRINGE (ML) INJECTION PRN
Status: DISCONTINUED | OUTPATIENT
Start: 2021-09-21 | End: 2021-09-23 | Stop reason: HOSPADM

## 2021-09-21 RX ORDER — METOPROLOL SUCCINATE 25 MG/1
25 TABLET, EXTENDED RELEASE ORAL DAILY
Status: DISCONTINUED | OUTPATIENT
Start: 2021-09-21 | End: 2021-09-23 | Stop reason: HOSPADM

## 2021-09-21 RX ORDER — METOPROLOL SUCCINATE 50 MG/1
50 TABLET, EXTENDED RELEASE ORAL ONCE
Status: COMPLETED | OUTPATIENT
Start: 2021-09-21 | End: 2021-09-21

## 2021-09-21 RX ORDER — NITROFURANTOIN 25; 75 MG/1; MG/1
100 CAPSULE ORAL ONCE
Status: COMPLETED | OUTPATIENT
Start: 2021-09-21 | End: 2021-09-21

## 2021-09-21 RX ORDER — LEVOFLOXACIN 5 MG/ML
500 INJECTION, SOLUTION INTRAVENOUS EVERY 24 HOURS
Status: DISCONTINUED | OUTPATIENT
Start: 2021-09-21 | End: 2021-09-22

## 2021-09-21 RX ORDER — ONDANSETRON 2 MG/ML
4 INJECTION INTRAMUSCULAR; INTRAVENOUS EVERY 6 HOURS PRN
Status: DISCONTINUED | OUTPATIENT
Start: 2021-09-21 | End: 2021-09-21

## 2021-09-21 RX ORDER — FLECAINIDE ACETATE 100 MG/1
50 TABLET ORAL 2 TIMES DAILY
Status: DISCONTINUED | OUTPATIENT
Start: 2021-09-21 | End: 2021-09-23 | Stop reason: HOSPADM

## 2021-09-21 RX ORDER — LOSARTAN POTASSIUM 100 MG/1
100 TABLET ORAL DAILY
Status: DISCONTINUED | OUTPATIENT
Start: 2021-09-21 | End: 2021-09-23 | Stop reason: HOSPADM

## 2021-09-21 RX ORDER — ACETAMINOPHEN 325 MG/1
650 TABLET ORAL EVERY 6 HOURS PRN
Status: DISCONTINUED | OUTPATIENT
Start: 2021-09-21 | End: 2021-09-23 | Stop reason: HOSPADM

## 2021-09-21 RX ORDER — 0.9 % SODIUM CHLORIDE 0.9 %
500 INTRAVENOUS SOLUTION INTRAVENOUS ONCE
Status: COMPLETED | OUTPATIENT
Start: 2021-09-21 | End: 2021-09-21

## 2021-09-21 RX ORDER — SODIUM CHLORIDE 0.9 % (FLUSH) 0.9 %
5-40 SYRINGE (ML) INJECTION EVERY 12 HOURS SCHEDULED
Status: DISCONTINUED | OUTPATIENT
Start: 2021-09-21 | End: 2021-09-23 | Stop reason: HOSPADM

## 2021-09-21 RX ORDER — SODIUM CHLORIDE 9 MG/ML
INJECTION, SOLUTION INTRAVENOUS CONTINUOUS
Status: DISCONTINUED | OUTPATIENT
Start: 2021-09-21 | End: 2021-09-23 | Stop reason: HOSPADM

## 2021-09-21 RX ORDER — PANTOPRAZOLE SODIUM 40 MG/1
40 TABLET, DELAYED RELEASE ORAL
Status: DISCONTINUED | OUTPATIENT
Start: 2021-09-21 | End: 2021-09-23 | Stop reason: HOSPADM

## 2021-09-21 RX ADMIN — Medication 10 ML: at 21:13

## 2021-09-21 RX ADMIN — MINOXIDIL 2.5 MG: 2.5 TABLET ORAL at 08:27

## 2021-09-21 RX ADMIN — PANTOPRAZOLE SODIUM 40 MG: 40 TABLET, DELAYED RELEASE ORAL at 06:04

## 2021-09-21 RX ADMIN — MINOXIDIL 2.5 MG: 2.5 TABLET ORAL at 02:13

## 2021-09-21 RX ADMIN — METOPROLOL SUCCINATE 25 MG: 25 TABLET, EXTENDED RELEASE ORAL at 08:27

## 2021-09-21 RX ADMIN — BRIMONIDINE TARTRATE 1 DROP: 2 SOLUTION OPHTHALMIC at 21:13

## 2021-09-21 RX ADMIN — FLECAINIDE ACETATE 50 MG: 100 TABLET ORAL at 08:27

## 2021-09-21 RX ADMIN — SODIUM CHLORIDE 500 ML: 9 INJECTION, SOLUTION INTRAVENOUS at 02:25

## 2021-09-21 RX ADMIN — AMITRIPTYLINE HYDROCHLORIDE 10 MG: 10 TABLET, FILM COATED ORAL at 21:06

## 2021-09-21 RX ADMIN — MINOXIDIL 2.5 MG: 2.5 TABLET ORAL at 21:11

## 2021-09-21 RX ADMIN — ENOXAPARIN SODIUM 40 MG: 40 INJECTION SUBCUTANEOUS at 08:27

## 2021-09-21 RX ADMIN — LEVOFLOXACIN 500 MG: 500 INJECTION, SOLUTION INTRAVENOUS at 06:10

## 2021-09-21 RX ADMIN — Medication 10 ML: at 08:28

## 2021-09-21 RX ADMIN — LOSARTAN POTASSIUM 100 MG: 100 TABLET, FILM COATED ORAL at 08:27

## 2021-09-21 RX ADMIN — FLECAINIDE ACETATE 50 MG: 100 TABLET ORAL at 21:06

## 2021-09-21 RX ADMIN — SODIUM CHLORIDE: 9 INJECTION, SOLUTION INTRAVENOUS at 06:06

## 2021-09-21 RX ADMIN — BRIMONIDINE TARTRATE 1 DROP: 2 SOLUTION OPHTHALMIC at 08:27

## 2021-09-21 RX ADMIN — TIMOLOL MALEATE 1 DROP: 5 SOLUTION OPHTHALMIC at 21:17

## 2021-09-21 RX ADMIN — METOPROLOL SUCCINATE 50 MG: 50 TABLET, EXTENDED RELEASE ORAL at 02:13

## 2021-09-21 RX ADMIN — NITROFURANTOIN (MONOHYDRATE/MACROCRYSTALS) 100 MG: 75; 25 CAPSULE ORAL at 03:09

## 2021-09-21 RX ADMIN — TIMOLOL MALEATE 1 DROP: 5 SOLUTION OPHTHALMIC at 08:27

## 2021-09-21 ASSESSMENT — PAIN SCALES - PAIN ASSESSMENT IN ADVANCED DEMENTIA (PAINAD)
BODYLANGUAGE: 0
BREATHING: 0
NEGVOCALIZATION: 0
TOTALSCORE: 0
FACIALEXPRESSION: 0
BREATHING: 0
BREATHING: 0
TOTALSCORE: 0
NEGVOCALIZATION: 0
CONSOLABILITY: 0
FACIALEXPRESSION: 0
BREATHING: 0
BODYLANGUAGE: 0
NEGVOCALIZATION: 0
TOTALSCORE: 0
BREATHING: 0
BODYLANGUAGE: 0
BREATHING: 0
TOTALSCORE: 0
CONSOLABILITY: 0
NEGVOCALIZATION: 0
BODYLANGUAGE: 0
TOTALSCORE: 0
CONSOLABILITY: 0
FACIALEXPRESSION: 0
NEGVOCALIZATION: 0
BODYLANGUAGE: 0
NEGVOCALIZATION: 0
FACIALEXPRESSION: 0
BODYLANGUAGE: 0
TOTALSCORE: 0
FACIALEXPRESSION: 0
CONSOLABILITY: 0
NEGVOCALIZATION: 0
TOTALSCORE: 0
BODYLANGUAGE: 0
CONSOLABILITY: 0
FACIALEXPRESSION: 0
BODYLANGUAGE: 0
FACIALEXPRESSION: 0
CONSOLABILITY: 0
TOTALSCORE: 0
CONSOLABILITY: 0
BREATHING: 0
CONSOLABILITY: 0
BREATHING: 0
FACIALEXPRESSION: 0
NEGVOCALIZATION: 0

## 2021-09-21 NOTE — PROGRESS NOTES
Aware of consult. Initial medical assessment/history not yet completed, per review of chart. I will plan to assess patient tomorrow after initial assessment and workup have been completed.     Irasema Hernandez MD  Staff Psychiatrist

## 2021-09-21 NOTE — PROGRESS NOTES
Spoke with patient spouse. Spouse states patient has had increased confusion and has been delusional.  States patient thinks shes in the wrong house etc.  Spouse states patients BP is always high and patient sees Dr. Lili Ortiz cardiologist and to not make any med changes for BP d/t states not going to change anything and patient has multiple allergies. Spouse states patient becoming non ambulatory and states not eating or drinking at home. MD aware of above and elevated Bps.

## 2021-09-21 NOTE — PLAN OF CARE
Problem: Falls - Risk of:  Goal: Will remain free from falls  Description: Will remain free from falls  9/21/2021 1103 by Kanwal Robert RN  Outcome: Ongoing  9/21/2021 0615 by Jaclyn Green RN  Outcome: Ongoing  9/21/2021 0615 by Jaclyn Green RN  Outcome: Ongoing  Goal: Absence of physical injury  Description: Absence of physical injury  9/21/2021 1103 by Kanwal Robert RN  Outcome: Ongoing  9/21/2021 0615 by Jaclyn Green RN  Outcome: Ongoing  9/21/2021 0615 by Jaclyn Green RN  Outcome: Ongoing     Problem: Confusion - Acute:  Goal: Absence of continued neurological deterioration signs and symptoms  Description: Absence of continued neurological deterioration signs and symptoms  9/21/2021 1103 by Kanwal Robert RN  Outcome: Ongoing  9/21/2021 0615 by Jaclyn Green RN  Outcome: Ongoing  9/21/2021 0615 by Jaclyn Green RN  Outcome: Ongoing  Goal: Mental status will be restored to baseline  Description: Mental status will be restored to baseline  9/21/2021 1103 by Kanwal Robert RN  Outcome: Ongoing  9/21/2021 0615 by Jaclyn Green RN  Outcome: Ongoing  9/21/2021 0615 by Jaclyn Green RN  Outcome: Ongoing     Problem: Discharge Planning:  Goal: Ability to perform activities of daily living will improve  Description: Ability to perform activities of daily living will improve  9/21/2021 1103 by Kanwal Robert RN  Outcome: Ongoing  9/21/2021 0615 by Jaclyn Green RN  Outcome: Ongoing  9/21/2021 0615 by Jaclyn Green RN  Outcome: Ongoing  Goal: Participates in care planning  Description: Participates in care planning  9/21/2021 1103 by Kanwal Robert RN  Outcome: Ongoing  9/21/2021 0615 by Jaclyn Green RN  Outcome: Ongoing  9/21/2021 0615 by Jaclyn Green RN  Outcome: Ongoing     Problem: Injury - Risk of, Physical Injury:  Goal: Will remain free from falls  Description: Will remain free from falls  9/21/2021 1103 by Kanwal Robert RN  Outcome: Ongoing  9/21/2021 0615 by Mona Martin Kallie Avila RN  Outcome: Ongoing  9/21/2021 0615 by Ellin Galeazzi, RN  Outcome: Ongoing  Goal: Absence of physical injury  Description: Absence of physical injury  9/21/2021 1103 by Almaz Stein RN  Outcome: Ongoing  9/21/2021 0615 by Ellin Galeazzi, RN  Outcome: Ongoing  9/21/2021 0615 by Ellin Galeazzi, RN  Outcome: Ongoing     Problem: Mood - Altered:  Goal: Mood stable  Description: Mood stable  9/21/2021 1103 by Almaz Stein RN  Outcome: Ongoing  9/21/2021 0615 by Ellin Galeazzi, RN  Outcome: Ongoing  9/21/2021 0615 by Ellin Galeazzi, RN  Outcome: Ongoing  Goal: Absence of abusive behavior  Description: Absence of abusive behavior  9/21/2021 1103 by Almaz Stein RN  Outcome: Ongoing  9/21/2021 0615 by Ellin Galeazzi, RN  Outcome: Ongoing  9/21/2021 0615 by Ellin Galeazzi, RN  Outcome: Ongoing  Goal: Verbalizations of feeling emotionally comfortable while being cared for will increase  Description: Verbalizations of feeling emotionally comfortable while being cared for will increase  9/21/2021 1103 by Almaz Stein RN  Outcome: Ongoing  9/21/2021 0615 by Ellin Galeazzi, RN  Outcome: Ongoing  9/21/2021 0615 by Ellin Galeazzi, RN  Outcome: Ongoing     Problem: Psychomotor Activity - Altered:  Goal: Absence of psychomotor disturbance signs and symptoms  Description: Absence of psychomotor disturbance signs and symptoms  9/21/2021 1103 by Almaz Stein RN  Outcome: Ongoing  9/21/2021 0615 by Ellin Galeazzi, RN  Outcome: Ongoing  9/21/2021 0615 by Ellin Galeazzi, RN  Outcome: Ongoing     Problem: Sensory Perception - Impaired:  Goal: Demonstrations of improved sensory functioning will increase  Description: Demonstrations of improved sensory functioning will increase  9/21/2021 1103 by Almaz Stein RN  Outcome: Ongoing  9/21/2021 0615 by Ellin Galeazzi, RN  Outcome: Ongoing  9/21/2021 0615 by Ellin Galeazzi, RN  Outcome: Ongoing  Goal: Decrease in sensory misperception frequency  Description: Decrease in sensory misperception frequency  9/21/2021 1103 by Almaz Stein RN  Outcome: Ongoing  9/21/2021 0615 by Ellin Galeazzi, RN  Outcome: Ongoing  9/21/2021 0615 by Ellin Galeazzi, RN  Outcome: Ongoing  Goal: Able to refrain from responding to false sensory perceptions  Description: Able to refrain from responding to false sensory perceptions  9/21/2021 1103 by Almaz Stein RN  Outcome: Ongoing  9/21/2021 0615 by Ellin Galeazzi, RN  Outcome: Ongoing  9/21/2021 0615 by Ellin Galeazzi, RN  Outcome: Ongoing  Goal: Demonstrates accurate environmental perceptions  Description: Demonstrates accurate environmental perceptions  9/21/2021 1103 by Almaz Stein RN  Outcome: Ongoing  9/21/2021 0615 by Ellin Galeazzi, RN  Outcome: Ongoing  9/21/2021 0615 by Ellin Galeazzi, RN  Outcome: Ongoing  Goal: Able to distinguish between reality-based and nonreality-based thinking  Description: Able to distinguish between reality-based and nonreality-based thinking  9/21/2021 1103 by Almaz Stein RN  Outcome: Ongoing  9/21/2021 0615 by Ellin Galeazzi, RN  Outcome: Ongoing  9/21/2021 0615 by Ellin Galeazzi, RN  Outcome: Ongoing  Goal: Able to interrupt nonreality-based thinking  Description: Able to interrupt nonreality-based thinking  9/21/2021 1103 by Almaz Stein RN  Outcome: Ongoing  9/21/2021 0615 by Ellin Galeazzi, RN  Outcome: Ongoing  9/21/2021 0615 by Ellin Galeazzi, RN  Outcome: Ongoing     Problem: Sleep Pattern Disturbance:  Goal: Appears well-rested  Description: Appears well-rested  9/21/2021 1103 by Almaz Stein RN  Outcome: Ongoing  9/21/2021 0615 by Ellin Galeazzi, RN  Outcome: Ongoing  9/21/2021 0615 by Ellin Galeazzi, RN  Outcome: Ongoing

## 2021-09-21 NOTE — PLAN OF CARE
Problem: Falls - Risk of:  Goal: Will remain free from falls  Description: Will remain free from falls  9/21/2021 0615 by Amina Saez RN  Outcome: Ongoing  9/21/2021 0615 by Amina Saez RN  Outcome: Ongoing  Goal: Absence of physical injury  Description: Absence of physical injury  9/21/2021 0615 by Amina Saez RN  Outcome: Ongoing  9/21/2021 0615 by Amina Saez RN  Outcome: Ongoing     Problem: Confusion - Acute:  Goal: Absence of continued neurological deterioration signs and symptoms  Description: Absence of continued neurological deterioration signs and symptoms  9/21/2021 0615 by Amina Saez RN  Outcome: Ongoing  9/21/2021 0615 by Amina Saez RN  Outcome: Ongoing  Goal: Mental status will be restored to baseline  Description: Mental status will be restored to baseline  9/21/2021 0615 by Amina Saez RN  Outcome: Ongoing  9/21/2021 0615 by Amina Saez RN  Outcome: Ongoing     Problem: Discharge Planning:  Goal: Ability to perform activities of daily living will improve  Description: Ability to perform activities of daily living will improve  9/21/2021 0615 by Amina Saez RN  Outcome: Ongoing  9/21/2021 0615 by Amina Saez RN  Outcome: Ongoing  Goal: Participates in care planning  Description: Participates in care planning  9/21/2021 0615 by Amina Saez RN  Outcome: Ongoing  9/21/2021 0615 by Amina Saez RN  Outcome: Ongoing     Problem: Injury - Risk of, Physical Injury:  Goal: Will remain free from falls  Description: Will remain free from falls  9/21/2021 0615 by Amina Saez RN  Outcome: Ongoing  9/21/2021 0615 by Amina Saez RN  Outcome: Ongoing  Goal: Absence of physical injury  Description: Absence of physical injury  9/21/2021 0615 by Amina Saez RN  Outcome: Ongoing  9/21/2021 0615 by Amina Saez RN  Outcome: Ongoing     Problem: Mood - Altered:  Goal: Mood stable  Description: Mood stable  9/21/2021 0615 by Yissel Leavitt RN  Outcome: Ongoing  9/21/2021 0615 by Yissel Leavitt RN  Outcome: Ongoing  Goal: Absence of abusive behavior  Description: Absence of abusive behavior  9/21/2021 0615 by Yissel Leavitt RN  Outcome: Ongoing  9/21/2021 0615 by Yissel Leavitt RN  Outcome: Ongoing  Goal: Verbalizations of feeling emotionally comfortable while being cared for will increase  Description: Verbalizations of feeling emotionally comfortable while being cared for will increase  9/21/2021 0615 by Yissel Leavitt RN  Outcome: Ongoing  9/21/2021 0615 by Yissel Leavitt RN  Outcome: Ongoing     Problem: Psychomotor Activity - Altered:  Goal: Absence of psychomotor disturbance signs and symptoms  Description: Absence of psychomotor disturbance signs and symptoms  9/21/2021 0615 by Yissel Leavitt RN  Outcome: Ongoing  9/21/2021 0615 by Yissel Leavitt RN  Outcome: Ongoing     Problem: Sensory Perception - Impaired:  Goal: Demonstrations of improved sensory functioning will increase  Description: Demonstrations of improved sensory functioning will increase  9/21/2021 0615 by Yissel Leavitt RN  Outcome: Ongoing  9/21/2021 0615 by Yissel Leavitt RN  Outcome: Ongoing  Goal: Decrease in sensory misperception frequency  Description: Decrease in sensory misperception frequency  9/21/2021 0615 by Yissel Leavitt RN  Outcome: Ongoing  9/21/2021 0615 by Yissel Leavitt RN  Outcome: Ongoing  Goal: Able to refrain from responding to false sensory perceptions  Description: Able to refrain from responding to false sensory perceptions  9/21/2021 0615 by Yissel Leavitt RN  Outcome: Ongoing  9/21/2021 0615 by Yissel Leavitt RN  Outcome: Ongoing  Goal: Demonstrates accurate environmental perceptions  Description: Demonstrates accurate environmental perceptions  9/21/2021 0615 by Yissel Leavitt RN  Outcome: Ongoing  9/21/2021 0615 by Yissel Leavitt RN  Outcome: Ongoing  Goal: Able to distinguish between reality-based and nonreality-based thinking  Description: Able to distinguish between reality-based and nonreality-based thinking  9/21/2021 0615 by Kvng Quach RN  Outcome: Ongoing  9/21/2021 0615 by Kvng Quach RN  Outcome: Ongoing  Goal: Able to interrupt nonreality-based thinking  Description: Able to interrupt nonreality-based thinking  9/21/2021 0615 by Kvng Quach RN  Outcome: Ongoing  9/21/2021 0615 by Kvng Quach RN  Outcome: Ongoing     Problem: Sleep Pattern Disturbance:  Goal: Appears well-rested  Description: Appears well-rested  9/21/2021 0615 by Kvng Quach RN  Outcome: Ongoing  9/21/2021 0615 by Kvng Quach RN  Outcome: Ongoing

## 2021-09-21 NOTE — ED NOTES
Dr. Alyson Saleh notified of elevated BP. Pt's  report Hx of SBP in the 200's.       Houston Adhikari RN  09/21/21 9167

## 2021-09-21 NOTE — FLOWSHEET NOTE
09/21/21 0705 09/21/21 0815   Vital Signs   Temp 98.9 °F (37.2 °C)  --    Temp Source Oral  --    Pulse 94  --    Heart Rate Source Monitor  --    Resp 18  --    BP (!) 225/125 (!) 225/101   BP Location Left upper arm Left upper arm   Patient Position Semi fowlers  --    Level of Consciousness  --  Alert (0)   Patient Currently in Pain  --  Denies   Oxygen Therapy   SpO2 98 %  --    O2 Device None (Room air)  --      Alert to self only. Skin w/d. resp e/e unlabored. Pure wic in place with clear yellow urine. Patient refused breakfast after setup and few bites/sips. BP elevated. BP meds given. Port to left upper chest with IVF as ordered. Nursing asmt completed.

## 2021-09-21 NOTE — CARE COORDINATION
Case Management Assessment  Initial Evaluation      Patient Name: Ros Todd  YOB: 1945  Diagnosis: UTI (urinary tract infection) [N39.0]  Acute cystitis without hematuria [N30.00]  Altered mental status, unspecified altered mental status type [R41.82]  Date / Time: 9/20/2021 10:16 PM    Admission status/Date: 09/21/2021 Inpatient   Chart Reviewed: Yes      Patient [facility]: No -pt requested writer call her  Anne Montejo for the assessment  Family Interviewed:  Yes - pt's  Anne Montejo      Hospitalization in the last 30 days:  No      Health Care Decision Maker :     (CM - must 1st enter selection under Navigator - emergency contact- Health Care Decision Maker Relationship and pick relationship)   Who do you trust or have selected to make healthcare decisions for you      Met with: pt's  Maximiliano Alexander conducted  (bedside/phone):phone call     Current PCP: Mayuri Valdes MD    Financial  [employer] Medicare  Precert required for SNF : Y          3 night stay required -  N    ADLS  Support Systems/Care Needs: Spouse/Significant Other, Children  Transportation: family    Meal Preparation: family    Housing  Living Arrangements: Pt lives at home with her   Steps: Ramp from garage   Intent for return to present living arrangements: Yes per   Identified Issues: Ivan James  Active with [address] : No Agency:(Services)  Type of Home Care Services: None  Passport/Waiver : No  :                      Phone Number:    Passport/Waiver Services: n/a          Durable Medical Equiptment   DME Provider: n/a  Equipment:   Walker_x__Cane___RTS___ BSC___Shower Chair___Hospital Bed___W/C____Other___Transport Chair_____  02 at ____Liter(s)---wears(frequency)_______ HHN ___ CPAP___ BiPap___   N/A____      Home O2 Use :  No    If No for home O2---if presently on O2 during hospitalization:  No  if yes CM to follow for potential DC O2 need  Informed of need for care provider to bring portable home O2 tank on day of discharge for nursing to connect prior to leaving:   Not Indicated  Verbalized agreement/Understanding:   Not Indicated    Community Service Affiliation  Dialysis:  No    · Agency:  · Location:  · Dialysis Schedule:  · Phone:   · Fax: Other Community Services: [place] with [employer] may do an assessment for assistance at home. Senior Services for [employer] PLAN: Explained Case Management role/services. Chart review completed. Met with pt at bedside and she requested writer call her  for the assessment. Called and spoke with pt's  tristan who completed the assessment. He stated pt is normally independent at home but for the last week, she has been requiring assistance from him. He stated they have a granddaughter comes in and out to assist and their son's are helpful. He stated he is blind but is able to assist pt at home. He denied needs or questions for CM. PT/OT will be beneficial when appropriate. CM will follow. Please notify CM if needs or concerns arise.

## 2021-09-21 NOTE — PROGRESS NOTES
Inpatient Occupational Therapy  Evaluation and Treatment    Unit: [facility]  Date:  9/21/2021  Patient Name:    Rema Martino  Admitting diagnosis:  UTI (urinary tract infection) [N39.0]  Acute cystitis without hematuria [N30.00]  Altered mental status, unspecified altered mental status type [R41.82]  Admit Date:  9/20/2021  Precautions/Restrictions/WB Status/ Lines/ Wounds/ Oxygen:   Fall risk, Bed/chair alarm, Lines -IV and Confusion  Treatment Time:  7940-1747  Treatment Number: 1   Timed code treatment minutes 30 minutes   Total Treatment minutes:   40  minutes    Patient Goals for Therapy:  \" not stated \"      Discharge Recommendations: SNF  DME needs for discharge: defer to facility       Therapy recommendations for staff:   Assist of 2 for bed mobility    History of Present Illness:   Per H&P from TEA Bragg-CNP 9/21: \"The patient is L 64 y. o. female with PMH of HTN, PVD, Afib, lover disease, GERD, glaucoma, migraine, gout, thyroid disease, and Follicular lymphoma -followed by Dr. Grajeda Or and dementia who presents to [facility] with concerns for worsening confusion and AMS to the point of not being able to take care of her self or even walk.  Pt is confused to situation and time. Ehsan Grissom thought she fell and broke her hip and was at the hispital to get it fixed- this happened in 2020.  Pt stated she is urinating a lot.  Denies any chest pain, shortness of breath, dizziness, fever, or chills  Home Health S4 Level Recommendation:  NA  AM-PAC Score: 10  Preadmission Environment  Per pts chart from last time seen in therapy. Pt unable to reliable historian   Pt. Lives with spouse. Spouse is blind. Granddaughter visits and assists sometimes (unclear to what capacity).   Home environment:            one story home  Steps to enter first floor: ramp  Steps to second floor:         N/A  Bathroom: unknown  Equipment owned: rollator   Sleeps in flat, non-adjustable bed.     Preadmission Status:  Pt. Able to drive: No - granddaughter drives. Pt Fully independent with ADLs: No -  assists with bathing and dressing. Pt. Required assistance from family for: Bathing, Cleaning, Cooking, Dressing and Laundry   Pt. required assist (level unknown) from  for bed mobility, transfers and gait and walked with Liliana Chavez. Pt chart review, pt's  reports she has not been able to ambulate in the last ~week. History of falls          Yes     Pain  Yes  Rating:moderate- Pt complaints increased when attempting to  the LEs   Location:LEs, cervical , UE   Pain Medicine Status: No request made      Cognition    A&O Person   Able to follow 1 step commands    Subjective  Patient lying supine in bed with no family present. Pt agreeable to this OT eval & tx. Upper Extremity ROM:    WFL    Upper Extremity Strength:    No formally tested due to decreased direction following     Upper Extremity Sensation    NT    Upper Extremity Proprioception:  NT    Coordination and Tone  Diminished    Balance  Functional Sitting Balance:  WFL  Functional Standing Balance:Impaired    Bed mobility:    Supine to sit:   Mod A   Sit to supine:   Max A   Rolling:    Not Tested  Scooting in sitting: Mod A  and Max A   Scooting to head of bed:   Max A  and 2 persons    Bridging:   Not Tested    Transfers:    Sit to stand: Mod A  and 2 persons to max - pt complained of pain in LEs when standing   Stand to sit:  Mod A  and 2 persons  Bed to chair:   Not Tested- pt unable to take a step when standing with mod-max assist   Standard toilet: Not Tested  Bed to [facility]:  Not Tested    Dressing:      UE:   Not Tested  LE:    Max A     Bathing:    UE:  Not Tested  LE:  Not Tested    Eating:    Max A after tray set up pt did not initiate taking a bite     Toileting:  Not Tested    Activity Tolerance   Pt completed therapy session with pain, decreased cognition, decreased balance     Positioning Needs:   Pt in bed, alarm set, positioned in proper neutral alignment and pressure relief provided. Exercise / Activities Initiated:   N/A    Patient/Family Education:   Role of OT    Assessment of Deficits: Pt seen for Occupational therapy evaluation in acute care setting. Pt demonstrated decreased Activity tolerance, ADLs, Balance , Bed mobility, Safety Awareness, Strength, Transfers and Cognition. Pt functioning below baseline and will likely benefit from skilled occupational therapy services to maximize safety and independence. Goal(s) : To be met in 3 Visits:  1). Bed to toilet/BSC: Mod A  and 2 persons with appropriate AD     To be met in 5 Visits:  1). Supine to/from Sit:  Min A  and Mod A   2). Upper Body Bathing:   Min A   3). Lower Body Bathing: Mod A  and Max A   4). Upper Body Dressing:  Min A  and Mod A   5). Lower Body Dressing: Mod A  and Max A   6). Pt to demonstrate UE exs x 15 reps with minimal cues    Rehabilitation Potential:  Fair for goals listed above. Strengths for achieving goals include: Pt cooperative  Barriers to achieving goals include:  Complexity of condition     Plan: To be seen 3-5 x/wk while in acute care setting for therapeutic exercises, bed mobility, transfers, dressing, bathing, family/patient education, ADL/IADL retraining, energy conservation training.      Pako Wetzel OTR/L [id number]          If patient discharges from this facility prior to next visit, this note will serve as the Discharge Summary

## 2021-09-21 NOTE — ED PROVIDER NOTES
I did not perform an evaluation of [address] but was asked to review her EKG. EKG interpreted by myself.    Rate: normal  Rhythm: NSR  Axis: normal  Intervals: within normal limits  ST/T Segments: Nonspecific abnormality inferior and anterolateral leads  Comparison: Compared to 6/30/20, there is no significant change  Impression: NSR with left ventricular hypertrophy with repolarization abnormality          Anshul Parra MD  09/20/21 3315

## 2021-09-21 NOTE — PROGRESS NOTES
Inpatient Physical Therapy Evaluation and Treatment    Unit: [facility]  Date:  9/21/2021  Patient Name:    Kaitlin Frias  Admitting diagnosis:  UTI (urinary tract infection) [N39.0]  Acute cystitis without hematuria [N30.00]  Altered mental status, unspecified altered mental status type [R41.82]  Admit Date:  9/20/2021  Precautions/Restrictions/WB Status/ Lines/ Wounds/ Oxygen: Fall risk, Bed/chair alarm, Lines -IV and Confusion    Treatment Time:  11:50-12:23  Treatment Number:  1   Timed Code Treatment Minutes: 33minutes  Total Treatment Minutes:  23  minutes    Patient Goals for Therapy: pt does not state        Discharge Recommendations: SNF  DME needs for discharge: defer to facility       Therapy recommendation for EMS Transport: requires transport by cot due to need for lift equipment for transfer    Therapy recommendations for staff:   Assist of 2 with use of MOI STEDY for all transfers to/from chair    History of Present Illness: Per H&P from TEA Harper-CNP 9/21: \"The patient is a 64 y.o. female with PMH of HTN, PVD, Afib, lover disease, GERD, glaucoma, migraine, gout, thyroid disease, and Follicular lymphoma -followed by Dr. Blaine Myers and dementia who presents to [facility] with concerns for worsening confusion and AMS to the point of not being able to take care of her self or even walk. Pt is confused to situation and time. She thought she fell and broke her hip and was at the hispital to get it fixed- this happened in 2020. Pt stated she is urinating a lot. Denies any chest pain, shortness of breath, dizziness, fever, or chills. \"    Home Health S4 Level Recommendation:  NA  AM-PAC Mobility Score            Preadmission Environment    Pt. Lives with spouse. Spouse is blind. Granddaughter visits and assists sometimes (unclear to what capacity).   Home environment:  one story home  Steps to enter first floor: ramp  Steps to second floor: N/A  Bathroom: unknown  Equipment owned: rollator   Sleeps in flat, non-adjustable bed. Preadmission Status:  Pt. Able to drive: No - granddaughter drives. Pt Fully independent with ADLs: No -  assists with bathing and dressing. Pt. Required assistance from family for: Bathing, Cleaning, Cooking, Dressing and Laundry   Pt. required assist (level unknown) from  for bed mobility, transfers and gait and walked with Cynthianancy Juan R. Pt chart review, pt's  reports she has not been able to ambulate in the last ~week. History of falls Yes     Pain   No  Location: N/A  Rating: NA /10  Pain Medicine Status: No request made    Cognition    A&O Person   Able to follow 1 step commands    Subjective  Patient lying supine in bed with no family present. Pt agreeable to this PT eval & tx. Upper Extremity ROM/Strength  Please see OT evaluation. Lower Extremity ROM / Strength   AROM WFL: No  ROM limitations: grossly limited ~50% in bilat ankles. Bilat knees WFL. Bilat hips grossly limited ~25%. BLE strength impaired, but not formally assessed with MMT. Attempted MMT, pt unable to consistently follow commands. Appears ~2/5 to ~3+/5 throughout. Lower Extremity Sensation    NT    Lower Extremity Proprioception:   NT    Coordination and Tone  NT    Balance  Sitting:  Good - ; Supervision  Comments: Sits at EOB with minimal UE support. Minimal reach fwd outside RG, minimal trunk excursion anteriorly for LB ADL. Standing: Poor; Mod A   Comments: Pt stood at EOB for ~2 minutes, in heavily flexed posture. Needed Mod A at pelvis to prevent sitting back to EOB. She reports R hip pain. Unable to extend into upright posture despite heavy verbal/tactile cuing and demo. Unable to weight shift laterally. Unable to initiate step with either LE fwd or laterally, or scoot step. Bed Mobility   Supine to Sit:    Mod A   Sit to Supine:   Max A   Rolling:   Not Tested  Scooting in sitting: Max A   Scooting in supine:   Total A and 2 persons    Transfer Training     Sit to stand: Mod A  and 2 persons  Stand to sit:   Min A   Bed to Chair:   Not Tested with use of N/A - unsafe to attempt given poor static standing balance and pt unable to initiate steps. Gait gait deferred due to poor standing tolerance/balance; pt ambulated 0 ft. Distance:      N/A ft  Deviations (firm surface/linoleum):  N/A  Assistive Device Used:    N/A  Level of Assist:    N/A  Comment: N/A    Stair Training deferred, pt does not have stairs in home environment    Activity Tolerance   Pt completed therapy session with Pain noted with weight bearing on RLE. Pt also with high sensitivity to certain PROM of RLE during bed mobility and positioning of leg while seated. BP (mmHg)  HR (bpm)  SpO2 (%)    Semifowlers before activity   unable to obtain - device error  90 95% on RA     Positioning Needs   Pt in bed, alarm set, positioned in proper neutral alignment and pressure relief provided. Call light provided and all needs within reach    Exercises Initiated  all completed bilaterally unless indicated  Ankle Pumps x 10 reps    Other  Discussed pain with RN. RN will order hip radiograph. Patient/Family Education   Pt educated on role of inpatient PT, POC, importance of continued activity, calling for assist with mobility. Assessment  Pt seen for Physical Therapy evaluation in acute care setting. Pt demonstrated decreased Activity tolerance, Balance, ROM, Safety and Strength as well as decreased independence with Ambulation, Bed Mobility  and Transfers. Pt is currently oriented to self only and follows commands inconsistently. She requires assist x 2 for bed mobility and sit<>stand transfers, as well as static standing. She appears limited by pain in the R hip. Pt will benefit from skilled PT in acute setting to promote activity tolerance and independent functional mobility.     Recommending SNF upon discharge as patient functioning well below baseline, demonstrates good rehab potential and unable to return home due to burden of care beyond caregiver ability, home environment not conducive to patient recovery and limited safety awareness. Goals : To be met in 3 visits:  1). Independent with LE Ex x 10 reps    To be met in 6 visits:  1). Supine to/from sit: Supervision  2). Sit to/from stand: Supervision  3). Bed to chair: Supervision  4). Gait: Ambulate 50 ft.  with  SBA and use of LRAD (least restrictive assistive device)  5). Tolerate B LE exercises 3 sets of 10-15 reps    Rehabilitation Potential: Fair  Strengths for achieving goals include:   Family Support and Pt cooperative   Barriers to achieving goals include:    Pain    Plan    To be seen 3-5 x / week  while in acute care setting for therapeutic exercises, bed mobility, transfers, progressive gait training, balance training, and family/patient education. Signature: Sabrina Koch, PT, DPT    If patient discharges from this facility prior to next visit, this note will serve as the Discharge Summary.

## 2021-09-21 NOTE — PROGRESS NOTES
Therapy states patient c/o right hip/leg pain. H/o hip fx.   Perfect served M.D. new orders for xray

## 2021-09-21 NOTE — ED NOTES
Pt resting in bed. Eyes closed, opens to verbal stimuli. Pt denies any needs. Call light in reach. Blayne Cardinal in place at this time. Pt requesting that staff not straight cath her at this time. Dr. Sanford Yanes notified. Side rails up times 2.       Rubens Cooper RN  09/21/21 0831

## 2021-09-21 NOTE — H&P
Hospital Medicine History & Physical      PCP: Roscoe Becerra MD    Date of Admission: 9/20/2021    Date of Service: Pt seen/examined on 09/21/21      Chief Complaint:    Chief Complaint   Patient presents with    Altered Mental Status     pt family states pt is early dementia, increased confusion over the last week. pt is not able walk or care for self over last week. Pt was sent here by PCP         History Of Present Illness: The patient is a 64 y.o. female with PMH of HTN, PVD, Afib, lover disease, GERD, glaucoma, migraine, gout, thyroid disease, and Follicular lymphoma -followed by Dr. Angelita Bee and dementia who presents to [facility] with concerns for worsening confusion and AMS to the point of not being able to take care of her self or even walk. Pt is confused to situation and time. She thought she fell and broke her hip and was at the hispital to get it fixed- this happened in 2020. Pt stated she is urinating a lot. Denies any chest pain, shortness of breath, dizziness, fever, or chills.       Past Medical History:        Diagnosis Date    Abnormal LFT's     Arthritis     Atrial fibrillation ([address]     Follicular lymphoma (HCC)     Dr Sonia Garvin GERD (gastroesophageal reflux disease)     Glaucoma     Gout 11/2/2010    Hypertension     Liver disease     steatohepatitis    Migraine     Non-ulcer dyspepsia     egd neg 11/2011    Peripheral vascular disease [facility] [address]     Thyroid disease        Past Surgical History:        Procedure Laterality Date    APPENDECTOMY      CATARACT REMOVAL      2007    CHOLECYSTECTOMY      2005    COLONOSCOPY  2/27/2015    polyp    ENDOSCOPY, COLON, DIAGNOSTIC  11/14/2011    bx stomach polyp    HIP SURGERY Right 06/30/2020    RIGHT PERCUTANEOUS HIP PINNING (Right Hip) w./ Dr Kurtis Jesus 6/30/20    HIP SURGERY Right 6/30/2020    RIGHT PERCUTANEOUS HIP PINNING [facility] [facility] ENDOSCOPY      UPPER GASTROINTESTINAL ENDOSCOPY  2/27/2015    gastric polyp       Medications Prior to Admission:    Prior to Admission medications    Medication Sig Start Date End Date Taking? Authorizing Provider   brimonidine (ALPHAGAN P) 0.15 % ophthalmic solution Place 1 drop into both eyes 2 times daily Med listed as \"[place] 0.15%\" on Pt's home meds list. Pt's son believes that is the correct eye drop. Yes Historical Provider, MD   amitriptyline (ELAVIL) 10 MG tablet Take 10 mg by mouth nightly   Yes Historical Provider, MD   flecainide (TAMBOCOR) 50 MG tablet Take 50 mg by mouth 2 times daily    Historical Provider, MD   ondansetron (ZOFRAN ODT) 4 MG disintegrating tablet Take 1 tablet by mouth every 8 hours as needed for Nausea or Vomiting 1/30/20   Kallie Ramirez DO   minoxidil (LONITEN) 2.5 MG tablet Take 1 tablet by mouth 2 times daily for 90 days. 4/21/13 6/29/20  Paulo Salcido MD   metoprolol (TOPROL-XL) 50 MG XL tablet Take 25 mg by mouth daily. Historical Provider, MD   olmesartan (BENICAR) 40 MG tablet Take 40 mg by mouth daily. Historical Provider, MD   omeprazole (PRILOSEC) 20 MG capsule Take 40 mg by mouth daily. Historical Provider, MD   timolol (TIMOPTIC-XR) 0.5 % ophthalmic gel-forming Place 1 drop into both eyes 2 times daily.       Historical Provider, MD       Allergies:  Latex, Ferric carboxymaltose, Aldactone [spironolactone], Baclofen, Cardizem [diltiazem hcl], Dilaudid [hydromorphone hcl], Droperidol, Guanfacine hcl, Hydrochlorothiazide, Ibuprofen, Lasix [furosemide], Lorazepam, Nexium [esomeprazole magnesium trihydrate], Nortriptyline, Other, Phenergan [promethazine hcl], Phenergan [promethazine hcl], Phenothiazines, Pilocarpine, Prochlorperazine edisylate, Reglan [metoclopramide hcl], Rofecoxib, Triavil [perphenazine-amitriptyline], Ultracet [tramadol-acetaminophen], Valium, Vioxx, Zofran odt [ondansetron], Amlodipine, Amoxicillin, Augmentin [amoxicillin-pot clavulanate], Avelox [moxifloxacin], Bactrim [sulfamethoxazole-trimethoprim], Biaxin [clarithromycin], Cephalexin, Clonidine derivatives, Codeine, Coreg [carvedilol], Demerol, Hydralazine, Levaquin [levofloxacin in d5w], Maxalt [rizatriptan benzoate], Morphine and related, Scopolamine, and Zonisamide    Social History:  The patient currently lives with     TOBACCO:   reports that she has never smoked. She has never used smokeless tobacco.  ETOH:   reports no history of alcohol use. Family History:   Positive as follows:        Problem Relation Age of Onset    Heart Disease Mother     Cancer Sister     Cancer Brother        REVIEW OF SYSTEMS:     See HPI limited due to confusion     PHYSICAL EXAM:    BP (!) 195/88   Pulse 92   Temp 98.9 °F (37.2 °C) (Oral)   Resp 18   Ht 5' 3\" (1.6 m)   Wt 101 lb 1.6 oz (45.9 kg)   SpO2 98%   BMI 17.91 kg/m²     Gen: No distress. Alert   Eyes: PERRL. No sclera icterus. No conjunctival injection. ENT: No discharge. Pharynx clear. Neck:  Trachea midline. Resp: No accessory muscle use. No crackles. No wheezes. No rhonchi. CV: Regular rate. Regular rhythm. No murmur. No rub. No edema. GI: Non-tender. Non-distended. Skin: Warm and dry. No nodule on exposed extremities. No rash on exposed extremities. M/S: No cyanosis. No joint deformity. No clubbing. Neuro: Awake. Grossly nonfocal  CN II through XII grossly intact, AAO to self only light touch sensation grossly intact to V1 through V3 distribution bilaterally, symmetric facial expressions bilaterally, tongue protrudes midline, uvula midline, patient strength weak bilaterally to upper and lower extremities, able shoulder shrug bilaterally. Psych: Oriented x 1. No anxiety or agitation.      CBC:   Recent Labs     09/21/21  0030   WBC 11.8*   HGB 13.6   HCT 39.1   MCV 90.6        BMP:   Recent Labs     09/21/21 0030      K 3.7    CO2 22   BUN 24*   CREATININE 1.1     LIVER PROFILE:   Recent Labs     09/21/21  0030   AST 18   ALT 9*   LIPASE 15.0   BILITOT 0.8   ALKPHOS 157*     PT/INR:   Recent Labs     09/21/21 0030   PROTIME 13.0*   INR 1.14*     APTT: No results for input(s): APTT in the last 72 hours. UA:  Recent Labs     09/21/21 0227   COLORU Yellow   PHUR 5.5   WBCUA 6-9*   RBCUA 3-4   BACTERIA 1+*   CLARITYU Clear   SPECGRAV 1.020   LEUKOCYTESUR TRACE*   UROBILINOGEN 0.2   BILIRUBINUR MODERATE*   BLOODU SMALL*   GLUCOSEU Negative            CARDIAC ENZYMES  Recent Labs     09/21/21 0030   TROPONINI <0.01       U/A:    Lab Results   Component Value Date    NITRITE neg 03/01/2012    COLORU Yellow 09/21/2021    WBCUA 6-9 09/21/2021    RBCUA 3-4 09/21/2021    BACTERIA 1+ 09/21/2021    CLARITYU Clear 09/21/2021    SPECGRAV 1.020 09/21/2021    LEUKOCYTESUR TRACE 09/21/2021    BLOODU SMALL 09/21/2021    GLUCOSEU Negative 09/21/2021    GLUCOSEU NEGATIVE 02/03/2012    AMORPHOUS 1+ 07/01/2020       CULTURES  BC x2 : NGTD  SARS-CoV-2 RNA, RT PCR NOT DETECTED          EKG:  I have reviewed the EKG with the following interpretation:   NSR    RADIOLOGY  XR CHEST PORTABLE   Final Result   No acute process. CT Head WO Contrast   Final Result   No acute intracranial abnormality. 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 Please note that MRI would be more   sensitive for evaluation of acute infarction. Iam Shaw MD have reviewed the chart on [facility] and personally interviewed and examined patient, reviewed the data (labs and imaging) and after discussion with my PA formulated the plan. Agree with note with the following edits.     HPI:     Patient is a 39-year-old white female with a past medical history of dementia, hypertension, vascular disease, atrial fibrillation, GERD, glaucoma, migraine, GERD, thyroid disease, basal ganglia and right corona radiate  - likely 2/2 worsening dementia vs UTI  -Psychiatry consultation    UTI   - pt with multiple allergies. Given x1 dose of Macrobid in ER  - WBC 11.8 procalcitonin 0.48  - Started on Levaquin D#1  - culture pending     HTN  - blood pressure not controlled  -noted not controlled in the past admissions as well as high as 200s on admission  - patient reports intolerance to several medications per allergy list    - was given x1 dose Toprol 50 mg in ER  - continue home medications Losartan 100 mg, Toprol XL 25 mg, minoxidil  - monitor closely   - nursing spoke with  and stated that her blood pressure runs high all the time, she follows with cardiology and stated he doesn't want any medication changes at this time    Dementia  - nursing spoke with , appears that her dementia has been getting worse  - psych consult  - monitor     Hx of Follicular Lymphoma  - follows with Dr. Alexa Pyle outpatient, last appointment 5/21    GERD  - continue PPI    PAF  - not on AC 2/2 GI bleeding  - continue flecainide and metoprolol     Migraines  - continue Elavil at night     DVT Prophylaxis: Lovenox   Diet: ADULT DIET;  Regular  Code Status: Full Code     Yanira Rodríguez, APRN - CNP 9:33 AM 09/21/21      Zakiya Guerin MD 9/21/2021 3:07 PM

## 2021-09-21 NOTE — ED PROVIDER NOTES
I independently examined and evaluated Rema Martino. In brief, patient is a 26-year-old female, with history of early dementia, who presented with concerns for worsening confusion and altered mental status with a rapid decline over the past week, to the point where she is not able to walk or care for herself. Patient was sent in by her PCP, Dr. Owen Miller, for further evaluation. Focused exam revealed patient resting comfortably, no acute distress. Heart regular rate and rhythm. Lungs with auscultation bilaterally. Abdomen soft, nondistended, nontender.   Nonfocal neurologic exam.    Labs Reviewed   PROCALCITONIN - Abnormal; Notable for the following components:       Result Value    Procalcitonin 0.48 (*)     All other components within normal limits    Narrative:     Performed at:  [facility]  [address],  [place], [place]   Phone [phone]   CBC WITH AUTO DIFFERENTIAL - Abnormal; Notable for the following components:    WBC 11.8 (*)     Neutrophils Absolute 9.2 (*)     All other components within normal limits    Narrative:     Performed at:  [facility]  [address],  [place], [place]   Phone [phone]   COMPREHENSIVE METABOLIC PANEL - Abnormal; Notable for the following components:    Anion Gap 18 (*)     Glucose 121 (*)     BUN 24 (*)     GFR Non- 48 (*)     GFR African American 58 (*)     Albumin/Globulin Ratio 1.0 (*)     Alkaline Phosphatase 157 (*)     ALT 9 (*)     All other components within normal limits    Narrative:     Performed at:  [facility]) - [facility]  [address],  [place], [place]   Phone [phone]   BRAIN NATRIURETIC PEPTIDE - Abnormal; Notable for the following components:    Pro-BNP 3,447 (*)     All other components within normal limits    Narrative:     Performed at:  [facility]) - [facility], [place], [place]   Phone [phone]   PROTIME-INR - Abnormal; Notable for the following components:    Protime 13.0 (*)     INR 1.14 (*)     All other components within normal limits    Narrative:     Performed at:  [facility] [address],  [place]   Phone [phone]   URINE RT REFLEX TO CULTURE - Abnormal; Notable for the following components:    Bilirubin Urine MODERATE (*)     Ketones, Urine 15 (*)     Blood, Urine SMALL (*)     Protein, UA 30 (*)     Leukocyte Esterase, Urine TRACE (*)     All other components within normal limits    Narrative:     Performed at:  [place] [address],  [place]   Phone [phone]   MICROSCOPIC URINALYSIS - Abnormal; Notable for the following components:    WBC, UA 6-9 (*)     Renal Epithelial, UA 2-5 (*)     Bacteria, UA 1+ (*)     All other components within normal limits    Narrative:     Performed at:  [place] [address],  [place]   Phone [phone]    Narrative:     Performed at:  [facility]) [facility],  [place]   Phone [phone]   CULTURE, BLOOD 1   CULTURE, BLOOD 2   CULTURE, URINE   MAGNESIUM    Narrative:     Performed at:  [facility] [address],  [place], [place]   Phone [phone]   LIPASE    Narrative:     Performed at:  [facility] [address],  [place]   Phone [phone]   TROPONIN    Narrative:     Performed at:  [place] [address],  [place]   Phone [phone]     XR CHEST PORTABLE   Final Result   No acute process. CT Head WO Contrast   Final Result   No acute intracranial abnormality.       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 Please note that MRI would be more   sensitive for evaluation of acute infarction. ED course: Patient is a 64-year-old female, presenting with concerns for worsening altered mental status over the past week, difficulty performing ADLs. HPI as detailed above. Plan arrival in the ED, vitals remarkable for hypertension, otherwise reassuring. Patient resting comfortably in no acute distress. She was seen in conjunction with the PA Preethi Carmona, please refer to his note for initial work-up and treatment. Care was turned over to me pending ongoing laboratory work-up. She has evidence of a questionable urinary tract infection, multiple drug allergies and will be started on Macrobid for this reason. Would benefit from hospitalization for further work-up and treatment of her altered mental status and decline. Findings were discussed with family members were comfortable agreement with plan of care. All diagnostic, treatment, and disposition decisions were made by myself in conjunction with the advanced practice provider. For all further details of the patient's emergency department visit, please see the advanced practice provider's documentation. 1. Altered mental status, unspecified altered mental status type    2. Acute cystitis without hematuria      Comment: Please note this report has been produced using speech recognition software and may contain errors related to that system including errors in grammar, punctuation, and spelling, as well as words and phrases that may be inappropriate. If there are any questions or concerns please feel free to contact the dictating provider for clarification.        Stacy Hooker MD  09/21/21 8217

## 2021-09-22 LAB
ALBUMIN SERPL-MCNC: 3.6 G/DL (ref 3.4–5)
ALP BLD-CCNC: 150 U/L (ref 40–129)
ALT SERPL-CCNC: 10 U/L (ref 10–40)
ANION GAP SERPL CALCULATED.3IONS-SCNC: 14 MMOL/L (ref 3–16)
AST SERPL-CCNC: 19 U/L (ref 15–37)
BASOPHILS ABSOLUTE: 0.1 K/UL (ref 0–0.2)
BASOPHILS RELATIVE PERCENT: 0.9 %
BILIRUB SERPL-MCNC: 0.9 MG/DL (ref 0–1)
BILIRUBIN DIRECT: <0.2 MG/DL (ref 0–0.3)
BILIRUBIN, INDIRECT: ABNORMAL MG/DL (ref 0–1)
BUN BLDV-MCNC: 16 MG/DL (ref 7–20)
CALCIUM SERPL-MCNC: 9 MG/DL (ref 8.3–10.6)
CHLORIDE BLD-SCNC: 103 MMOL/L (ref 99–110)
CO2: 24 MMOL/L (ref 21–32)
CREAT SERPL-MCNC: 0.8 MG/DL (ref 0.6–1.2)
EKG ATRIAL RATE: 87 BPM
EKG DIAGNOSIS: NORMAL
EKG P AXIS: 47 DEGREES
EKG P-R INTERVAL: 152 MS
EKG Q-T INTERVAL: 370 MS
EKG QRS DURATION: 86 MS
EKG QTC CALCULATION (BAZETT): 445 MS
EKG R AXIS: 17 DEGREES
EKG T AXIS: 226 DEGREES
EKG VENTRICULAR RATE: 87 BPM
EOSINOPHILS ABSOLUTE: 0 K/UL (ref 0–0.6)
EOSINOPHILS RELATIVE PERCENT: 0.1 %
GFR AFRICAN AMERICAN: >60
GFR NON-AFRICAN AMERICAN: >60
GLUCOSE BLD-MCNC: 133 MG/DL (ref 70–99)
HCT VFR BLD CALC: 37.8 % (ref 36–48)
HEMOGLOBIN: 12.9 G/DL (ref 12–16)
LYMPHOCYTES ABSOLUTE: 0.9 K/UL (ref 1–5.1)
LYMPHOCYTES RELATIVE PERCENT: 8.6 %
MAGNESIUM: 1.7 MG/DL (ref 1.8–2.4)
MCH RBC QN AUTO: 31.3 PG (ref 26–34)
MCHC RBC AUTO-ENTMCNC: 34.1 G/DL (ref 31–36)
MCV RBC AUTO: 91.8 FL (ref 80–100)
MONOCYTES ABSOLUTE: 1.1 K/UL (ref 0–1.3)
MONOCYTES RELATIVE PERCENT: 10.9 %
NEUTROPHILS ABSOLUTE: 8.1 K/UL (ref 1.7–7.7)
NEUTROPHILS RELATIVE PERCENT: 79.5 %
ORGANISM: ABNORMAL
PDW BLD-RTO: 13.7 % (ref 12.4–15.4)
PLATELET # BLD: 140 K/UL (ref 135–450)
PMV BLD AUTO: 9.7 FL (ref 5–10.5)
POTASSIUM REFLEX MAGNESIUM: 3.3 MMOL/L (ref 3.5–5.1)
RBC # BLD: 4.11 M/UL (ref 4–5.2)
SODIUM BLD-SCNC: 141 MMOL/L (ref 136–145)
TOTAL PROTEIN: 7.1 G/DL (ref 6.4–8.2)
URINE CULTURE, ROUTINE: ABNORMAL
WBC # BLD: 10.2 K/UL (ref 4–11)

## 2021-09-22 PROCEDURE — 6370000000 HC RX 637 (ALT 250 FOR IP): Performed by: INTERNAL MEDICINE

## 2021-09-22 PROCEDURE — 6360000002 HC RX W HCPCS: Performed by: HOSPITALIST

## 2021-09-22 PROCEDURE — 80048 BASIC METABOLIC PNL TOTAL CA: CPT

## 2021-09-22 PROCEDURE — 2500000003 HC RX 250 WO HCPCS: Performed by: HOSPITALIST

## 2021-09-22 PROCEDURE — 93010 ELECTROCARDIOGRAM REPORT: CPT | Performed by: INTERNAL MEDICINE

## 2021-09-22 PROCEDURE — 6370000000 HC RX 637 (ALT 250 FOR IP): Performed by: PSYCHIATRY & NEUROLOGY

## 2021-09-22 PROCEDURE — 1200000000 HC SEMI PRIVATE

## 2021-09-22 PROCEDURE — 99232 SBSQ HOSP IP/OBS MODERATE 35: CPT | Performed by: INTERNAL MEDICINE

## 2021-09-22 PROCEDURE — 2580000003 HC RX 258: Performed by: HOSPITALIST

## 2021-09-22 PROCEDURE — 85025 COMPLETE CBC W/AUTO DIFF WBC: CPT

## 2021-09-22 PROCEDURE — 6360000002 HC RX W HCPCS: Performed by: INTERNAL MEDICINE

## 2021-09-22 PROCEDURE — 80076 HEPATIC FUNCTION PANEL: CPT

## 2021-09-22 PROCEDURE — 83735 ASSAY OF MAGNESIUM: CPT

## 2021-09-22 PROCEDURE — 99222 1ST HOSP IP/OBS MODERATE 55: CPT | Performed by: PSYCHIATRY & NEUROLOGY

## 2021-09-22 PROCEDURE — 6370000000 HC RX 637 (ALT 250 FOR IP): Performed by: HOSPITALIST

## 2021-09-22 RX ORDER — MECOBALAMIN 5000 MCG
5 TABLET,DISINTEGRATING ORAL NIGHTLY PRN
Qty: 15 TABLET | Refills: 0
Start: 2021-09-22

## 2021-09-22 RX ORDER — ENALAPRILAT 2.5 MG/2ML
1.25 INJECTION INTRAVENOUS EVERY 6 HOURS PRN
Status: DISCONTINUED | OUTPATIENT
Start: 2021-09-22 | End: 2021-09-23 | Stop reason: HOSPADM

## 2021-09-22 RX ORDER — MECOBALAMIN 5000 MCG
5 TABLET,DISINTEGRATING ORAL NIGHTLY PRN
Status: DISCONTINUED | OUTPATIENT
Start: 2021-09-22 | End: 2021-09-23 | Stop reason: HOSPADM

## 2021-09-22 RX ORDER — HYDRALAZINE HYDROCHLORIDE 20 MG/ML
10 INJECTION INTRAMUSCULAR; INTRAVENOUS EVERY 6 HOURS PRN
Status: DISCONTINUED | OUTPATIENT
Start: 2021-09-22 | End: 2021-09-22

## 2021-09-22 RX ORDER — LEVOFLOXACIN 250 MG/1
250 TABLET ORAL DAILY
Qty: 3 TABLET | Refills: 0
Start: 2021-09-22 | End: 2021-09-23 | Stop reason: HOSPADM

## 2021-09-22 RX ORDER — MAGNESIUM SULFATE 1 G/100ML
1000 INJECTION INTRAVENOUS ONCE
Status: COMPLETED | OUTPATIENT
Start: 2021-09-22 | End: 2021-09-22

## 2021-09-22 RX ORDER — POTASSIUM CHLORIDE 20 MEQ/1
40 TABLET, EXTENDED RELEASE ORAL ONCE
Status: COMPLETED | OUTPATIENT
Start: 2021-09-22 | End: 2021-09-22

## 2021-09-22 RX ADMIN — LEVOFLOXACIN 500 MG: 500 INJECTION, SOLUTION INTRAVENOUS at 05:38

## 2021-09-22 RX ADMIN — Medication 5 MG: at 20:58

## 2021-09-22 RX ADMIN — METOPROLOL SUCCINATE 25 MG: 25 TABLET, EXTENDED RELEASE ORAL at 20:58

## 2021-09-22 RX ADMIN — PANTOPRAZOLE SODIUM 40 MG: 40 TABLET, DELAYED RELEASE ORAL at 05:39

## 2021-09-22 RX ADMIN — TIMOLOL MALEATE 1 DROP: 5 SOLUTION OPHTHALMIC at 09:47

## 2021-09-22 RX ADMIN — MAGNESIUM SULFATE HEPTAHYDRATE 1000 MG: 1 INJECTION, SOLUTION INTRAVENOUS at 09:58

## 2021-09-22 RX ADMIN — ENALAPRILAT 1.25 MG: 1.25 INJECTION INTRAVENOUS at 23:23

## 2021-09-22 RX ADMIN — MINOXIDIL 2.5 MG: 2.5 TABLET ORAL at 20:57

## 2021-09-22 RX ADMIN — ACETAMINOPHEN 650 MG: 325 TABLET ORAL at 21:02

## 2021-09-22 RX ADMIN — ENOXAPARIN SODIUM 40 MG: 40 INJECTION SUBCUTANEOUS at 09:48

## 2021-09-22 RX ADMIN — SODIUM CHLORIDE: 9 INJECTION, SOLUTION INTRAVENOUS at 13:46

## 2021-09-22 RX ADMIN — BRIMONIDINE TARTRATE 1 DROP: 2 SOLUTION OPHTHALMIC at 09:47

## 2021-09-22 RX ADMIN — POTASSIUM CHLORIDE 40 MEQ: 1500 TABLET, EXTENDED RELEASE ORAL at 09:47

## 2021-09-22 RX ADMIN — MINOXIDIL 2.5 MG: 2.5 TABLET ORAL at 09:49

## 2021-09-22 RX ADMIN — FLECAINIDE ACETATE 50 MG: 100 TABLET ORAL at 20:58

## 2021-09-22 RX ADMIN — FLECAINIDE ACETATE 50 MG: 100 TABLET ORAL at 09:47

## 2021-09-22 RX ADMIN — LOSARTAN POTASSIUM 100 MG: 100 TABLET, FILM COATED ORAL at 20:58

## 2021-09-22 ASSESSMENT — PAIN SCALES - PAIN ASSESSMENT IN ADVANCED DEMENTIA (PAINAD)
CONSOLABILITY: 0
BREATHING: 0
BODYLANGUAGE: 0
FACIALEXPRESSION: 0
TOTALSCORE: 0
NEGVOCALIZATION: 0

## 2021-09-22 ASSESSMENT — PAIN SCALES - GENERAL: PAINLEVEL_OUTOF10: 4

## 2021-09-22 NOTE — CONSULTS
Psychiatry Initial Consultation    Admission Date:    9/20/2021    Reason for Consult: Altered mental status    HPI:   Mrs. Camilo Piper is a 64year old female with no past psychiatric history who was admitted for 1.5 weeks of altered mental status. Psychiatry was consulted for same. Pt's medical work-up since admission has been relatively unremarkable. Slight bump in BUN that is now back in nml range, no acute obvious CVA on head CT but both diffuse microvascular changes and atrophy, isolated elevation of alk phos, and possible UTI (cx still pending but pt currently on abx). She has been oriented to self only since admission, but cooperative with care. Seen by both PT/OT who are recommending SNF. On exam she was oriented to self only, inattentive, and unable to provide any accurate history regarding her admission or recent symptoms. She did state that staff was taking good care of her and she felt safe in the hospital.  She was not actively hallucinating. She denied SI/HI. Called pt's  for additional history and later ended up engaging in an extensive conversation with him about patient's likely diagnoses. Briefly he reports a change in her mentation and function starting a week and a half ago. She developed GI symptoms, specifically vomiting. Friends of there's who drive them to [facility] later indicated that had been sick with gastro a few days earlier. These symptoms lasted for 2 days during which patient was notably more disoriented, confused, forgetful, and started needing much more assistance with her ADLs. Even after GI symptoms improved pt's appetite remained very poor, and she was notably weaker and having trouble getting up from bed. Also exhibiting disorganization of behavior vs apraxia, specifically chewing her medications vs swallowing them. PCP encouraged family to take her to ED.  reports cognitive issues that far precede the above however.   These seem to date Provider   brimonidine (ALPHAGAN P) 0.15 % ophthalmic solution Place 1 drop into both eyes 2 times daily Med listed as \"[place] 0.15%\" on Pt's home meds list. Pt's son believes that is the correct eye drop. Yes Historical Provider, MD   amitriptyline (ELAVIL) 10 MG tablet Take 10 mg by mouth nightly   Yes Historical Provider, MD   flecainide (TAMBOCOR) 50 MG tablet Take 50 mg by mouth 2 times daily    Historical Provider, MD   ondansetron (ZOFRAN ODT) 4 MG disintegrating tablet Take 1 tablet by mouth every 8 hours as needed for Nausea or Vomiting 20   Pardeep Gandara DO   minoxidil (LONITEN) 2.5 MG tablet Take 1 tablet by mouth 2 times daily for 90 days. 13  Beverly Valadez MD   metoprolol (TOPROL-XL) 50 MG XL tablet Take 25 mg by mouth daily. Historical Provider, MD   olmesartan (BENICAR) 40 MG tablet Take 40 mg by mouth daily. Historical Provider, MD   omeprazole (PRILOSEC) 20 MG capsule Take 40 mg by mouth daily. Historical Provider, MD   timolol (TIMOPTIC-XR) 0.5 % ophthalmic gel-forming Place 1 drop into both eyes 2 times daily. Historical Provider, MD       Chemical Dependency History:   Tobacco: Non-smoker  Alcohol: Non-drinker  Illicit: Non-user    Family Hx:    Family History   Problem Relation Age of Onset    Heart Disease Mother     Cancer Sister     Cancer Brother      Social Hx:   Retired. Lives at home with . Has two adult sons.     Current Medications Ordered:   [Held by provider] amitriptyline  10 mg Oral Nightly    brimonidine  1 drop Both Eyes BID    flecainide  50 mg Oral BID    metoprolol succinate  25 mg Oral Daily    minoxidil  2.5 mg Oral BID    losartan  100 mg Oral Daily    pantoprazole  40 mg Oral QAM AC    timolol  1 drop Both Eyes BID    sodium chloride flush  5-40 mL IntraVENous 2 times per day    enoxaparin  40 mg SubCUTAneous Daily    levofloxacin  500 mg IntraVENous Q24H      PRN Meds: melatonin, sodium chloride flush, sodium chloride, ondansetron **OR** [DISCONTINUED] ondansetron, polyethylene glycol, acetaminophen **OR** acetaminophen     ROS:    Psychiatric: As per HPI. All other systems were reviewed and negative except as previously documented in HPI. PE:    BP (!) 205/93   Pulse 94   Temp 99.4 °F (37.4 °C) (Oral)   Resp 18   Ht 5' 3\" (1.6 m)   Wt 101 lb 1.6 oz (45.9 kg)   SpO2 97%   BMI 17.91 kg/m²       Motor / Gait: psychomotor retardation, no involuntary movements, gait deferred    Mental Status Examination:    Appearance: WF, appears stated age, wearing hospital gown, good grooming and hygiene   Behavior/Attitude toward examiner:  Lethargic  Speech:  nml rate, soft volume, impoverished amount, non pressured  Mood: \"Fine\"  Affect:  Somnolent  Thought processes:  Impoverished  Thought Content: Denies SI, denies HI, no delusions, +confabulation  Perceptions: Denies AVH, not RTIS  Attention: impaired  Abstraction: concrete  Cognition: Oriented to self only, recall severely impaired  Insight: Poor insight   Judgment: Impaired judgment      LAB: Reviewed all labs obtained during admission to date. Dx:   Primary Psychiatric (DSM V) Diagnosis: Delirium, hypoactive, due to dehydration and UTI  Secondary Psychiatric (DSM V) Diagnoses: Mixed alzheimer's and vascular dementia, with behavioral disturbance  Chemical Dependency Diagnoses: None    Recommendations:    1. Patient's history is clearly consistent with delirium superimposed upon underlying dementia. Suspect delirium secondary to a combination of dehydration and UTI. 2.  I discussed the above with  at length as neither diagnoses had ever before been specifically described to him.   Further discussed the below treatment recommendations:    -Hold amitriptyline given anticholinergic properties  -Start melatonin nightly prn.  -Recommend against starting a cognitive enhancer in the setting of acute delirium, sans a complete outpatient dementia work up and given patient's extensive history of allergies. -Inpatient psychiatry not indicated  -Continue to treat underlying medical conditions. 3.  Patient should be referred to outpatient neurology or geriatric psychiatry at discharge so that she can have a full, outpatient dementia work-up completed. 4.  General delirium interventions: Minimize use of anticholinergic agents, antihistamines, and benzodiazepines as these agents can worsen and prolong delirium. Aim to have patient awake and ideally engaged during daytime hours, keep blinds open and lights on in room during daytime hours, minimizes lines and discontinue foleys as soon as possible, keep use of restraints to minimum necessary duration for patient and staff safety, frequently reorient patient with each interaction, provide sensory assistive devices if needed (ie hearing aids, glasses), routinely assess and treat pain. Spent > 80 minutes face to face with patient of which >50% was spent counseling and providing education regarding diagnosis, treatment options, and prognosis. Thank you for consult. Please do not hesitate to contact provider if there are additional questions regarding patient.     Vida Johnson MD  Staff Psychiatrist

## 2021-09-22 NOTE — PROGRESS NOTES
Progress Note    Admit Date:  9/20/2021    Subjective:  Ms. Whitney Forbes is still confused. PT and OT recommending SNF. Urine culture no growth so far. Blood pressure continues to be high. Patient has uncontrolled blood pressure at baseline and has seen cardiology for this before. Objective:   BP (!) 205/93   Pulse 94   Temp 99.4 °F (37.4 °C) (Oral)   Resp 18   Ht 5' 3\" (1.6 m)   Wt 101 lb 1.6 oz (45.9 kg)   SpO2 97%   BMI 17.91 kg/m²        Intake/Output Summary (Last 24 hours) at 9/22/2021 1715  Last data filed at 9/22/2021 1255  Gross per 24 hour   Intake 50 ml   Output 1800 ml   Net -1750 ml       Physical Exam:    General appearance: alert, appears stated age and cooperative. Confused  Head: Normocephalic, without obvious abnormality, atraumatic  Eyes: conjunctivae/corneas clear. PERRL, EOM's intact.   Neck: no adenopathy, no carotid bruit, no JVD, supple, symmetrical, trachea midline and thyroid not enlarged, symmetric, no tenderness/mass/nodules  Lungs: clear to auscultation bilaterally  Heart: regular rate and rhythm, S1, S2 normal, no murmur, click, rub or gallop  Abdomen: soft, non-tender; bowel sounds normal; no masses,  no organomegaly  Extremities: extremities normal, atraumatic, no cyanosis or edema  Pulses: 2+ and symmetric  Skin: Skin color, texture, turgor normal. No rashes or lesions  Neurologic: Grossly normal    Scheduled Meds:   [Held by provider] amitriptyline  10 mg Oral Nightly    brimonidine  1 drop Both Eyes BID    flecainide  50 mg Oral BID    metoprolol succinate  25 mg Oral Daily    minoxidil  2.5 mg Oral BID    losartan  100 mg Oral Daily    pantoprazole  40 mg Oral QAM AC    timolol  1 drop Both Eyes BID    sodium chloride flush  5-40 mL IntraVENous 2 times per day    enoxaparin  40 mg SubCUTAneous Daily    levofloxacin  500 mg IntraVENous Q24H       Continuous Infusions:   sodium chloride      sodium chloride 75 mL/hr at 09/22/21 1346       PRN Meds:  melatonin, sodium chloride flush, sodium chloride, ondansetron **OR** [DISCONTINUED] ondansetron, polyethylene glycol, acetaminophen **OR** acetaminophen      Data:  CBC:   Recent Labs     09/21/21 0030 09/22/21  0545   WBC 11.8* 10.2   HGB 13.6 12.9   HCT 39.1 37.8   MCV 90.6 91.8    140     BMP:   Recent Labs     09/21/21 0030 09/22/21  0545    141   K 3.7 3.3*    103   CO2 22 24   BUN 24* 16   CREATININE 1.1 0.8     LIVER PROFILE:   Recent Labs     09/21/21 0030 09/22/21  0545   AST 18 19   ALT 9* 10   LIPASE 15.0  --    BILIDIR  --  <0.2   BILITOT 0.8 0.9   ALKPHOS 157* 150*     PT/INR:   Recent Labs     09/21/21 0030   PROTIME 13.0*   INR 1.14*     Cultures:   covid 19 negative    XR HIP LEFT (2-3 VIEWS)   Final Result   Negative radiographs of left hip. XR CHEST PORTABLE   Final Result   No acute process. CT Head WO Contrast   Final Result   No acute intracranial abnormality. 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 Please note that MRI would be more   sensitive for evaluation of acute infarction. Assessment:  Principal Problem:    Altered mental status, unspecified  Active Problems:    Hypertension, uncontrolled    PAF (paroxysmal atrial fibrillation) (HCC)    UTI (urinary tract infection)    Late onset Alzheimer's dementia with behavioral disturbance ([facility]  Resolved Problems:    * No resolved hospital problems. *      Plan:    AMS  - CT head noted with no acute intracranial abnormality. Chronic microvascular ischemic changes with chronic infarcts to the right basal ganglia and right corona radiate  - likely 2/2 worsening dementia vs UTI  -Psychiatry consultation     UTI   - pt with multiple allergies.  Given x1 dose of Macrobid in ER  - WBC 11.8 procalcitonin 0.48  - Started on Levaquin D#1  - culture pending      HTN  - blood pressure not controlled  -noted not controlled in the past admissions as well as high as 200s on admission  - patient reports intolerance to several medications per allergy list    - was given x1 dose Toprol 50 mg in ER  - continue home medications Losartan 100 mg, Toprol XL 25 mg, minoxidil  - monitor closely   - nursing spoke with  and stated that her blood pressure runs high all the time, she follows with cardiology and stated he doesn't want any medication changes at this time     Dementia  - nursing spoke with , appears that her dementia has been getting worse  - psych consult  - monitor      Hx of Follicular Lymphoma  - follows with Dr. Keyla Mayers outpatient, last appointment 5/21     GERD  - continue PPI     PAF  - not on AC 2/2 GI bleeding  - continue flecainide and metoprolol      Migraines  - continue Elavil at night     Okay for discharge. Discharge planner working on placement.     DVT Prophylaxis: Lovenox   Diet: ADULT DIET;  Regular  Code Status: Full Code     Sara Gabriel MD, MD 9/22/2021 5:15 PM

## 2021-09-22 NOTE — PLAN OF CARE
Problem: Falls - Risk of:  Goal: Will remain free from falls  Description: Will remain free from falls  9/21/2021 2119 by Sybil Cespedes RN  Outcome: Ongoing  9/21/2021 1103 by Ar He RN  Outcome: Ongoing  Goal: Absence of physical injury  Description: Absence of physical injury  9/21/2021 2119 by Sybil Cespedes RN  Outcome: Ongoing  9/21/2021 1103 by Ar He RN  Outcome: Ongoing     Problem: Confusion - Acute:  Goal: Absence of continued neurological deterioration signs and symptoms  Description: Absence of continued neurological deterioration signs and symptoms  9/21/2021 2119 by Sybil Cespedes RN  Outcome: Ongoing  9/21/2021 1103 by Ar He RN  Outcome: Ongoing  Goal: Mental status will be restored to baseline  Description: Mental status will be restored to baseline  9/21/2021 2119 by Sybil Cespedes RN  Outcome: Ongoing  9/21/2021 1103 by Ar He RN  Outcome: Ongoing     Problem: Discharge Planning:  Goal: Ability to perform activities of daily living will improve  Description: Ability to perform activities of daily living will improve  9/21/2021 2119 by Sybil Cespedes RN  Outcome: Ongoing  9/21/2021 1103 by Ar He RN  Outcome: Ongoing  Goal: Participates in care planning  Description: Participates in care planning  9/21/2021 2119 by Sybil Cespedes RN  Outcome: Ongoing  9/21/2021 1103 by Ar He RN  Outcome: Ongoing     Problem: Injury - Risk of, Physical Injury:  Goal: Will remain free from falls  Description: Will remain free from falls  9/21/2021 2119 by Sybil Cespedes RN  Outcome: Ongoing  9/21/2021 1103 by Ar He RN  Outcome: Ongoing  Goal: Absence of physical injury  Description: Absence of physical injury  9/21/2021 2119 by Sybil Cespedes RN  Outcome: Ongoing  9/21/2021 1103 by Ar He RN  Outcome: Ongoing     Problem: Mood - Altered:  Goal: Mood stable  Description: Mood stable  9/21/2021 2119 by Sybil Cespedes RN  Outcome: Ongoing  9/21/2021 1103 by Randall Howard RN  Outcome: Ongoing  Goal: Absence of abusive behavior  Description: Absence of abusive behavior  9/21/2021 2119 by Dayana Plascencia RN  Outcome: Ongoing  9/21/2021 1103 by Randall Howard RN  Outcome: Ongoing  Goal: Verbalizations of feeling emotionally comfortable while being cared for will increase  Description: Verbalizations of feeling emotionally comfortable while being cared for will increase  9/21/2021 2119 by Dayana Plascencia RN  Outcome: Ongoing  9/21/2021 1103 by Randall Howard RN  Outcome: Ongoing     Problem: Psychomotor Activity - Altered:  Goal: Absence of psychomotor disturbance signs and symptoms  Description: Absence of psychomotor disturbance signs and symptoms  9/21/2021 2119 by Dayana Plascencia RN  Outcome: Ongoing  9/21/2021 1103 by Randall Howard RN  Outcome: Ongoing     Problem: Sensory Perception - Impaired:  Goal: Demonstrations of improved sensory functioning will increase  Description: Demonstrations of improved sensory functioning will increase  9/21/2021 2119 by Dayana Plascencia RN  Outcome: Ongoing  9/21/2021 1103 by Randall Howard RN  Outcome: Ongoing  Goal: Decrease in sensory misperception frequency  Description: Decrease in sensory misperception frequency  9/21/2021 2119 by Dayana Plascencia RN  Outcome: Ongoing  9/21/2021 1103 by Randall Howard RN  Outcome: Ongoing  Goal: Able to refrain from responding to false sensory perceptions  Description: Able to refrain from responding to false sensory perceptions  9/21/2021 2119 by Dayana Plascencia RN  Outcome: Ongoing  9/21/2021 1103 by Randall Howard RN  Outcome: Ongoing  Goal: Demonstrates accurate environmental perceptions  Description: Demonstrates accurate environmental perceptions  9/21/2021 2119 by Dayana Plascencia RN  Outcome: Ongoing  9/21/2021 1103 by Randall Howard RN  Outcome: Ongoing  Goal: Able to distinguish between reality-based and nonreality-based thinking  Description: Able to distinguish between reality-based and nonreality-based thinking  9/21/2021 2119 by Kyra Rey RN  Outcome: Ongoing  9/21/2021 1103 by Corinne Mckeon RN  Outcome: Ongoing  Goal: Able to interrupt nonreality-based thinking  Description: Able to interrupt nonreality-based thinking  9/21/2021 2119 by Kyra Rey RN  Outcome: Ongoing  9/21/2021 1103 by Corinne Mckeon RN  Outcome: Ongoing     Problem: Sleep Pattern Disturbance:  Goal: Appears well-rested  Description: Appears well-rested  9/21/2021 2119 by Kyra Rey RN  Outcome: Ongoing  9/21/2021 1103 by Corinne Mckeon RN  Outcome: Ongoing     Problem: Skin Integrity:  Goal: Will show no infection signs and symptoms  Description: Will show no infection signs and symptoms  Outcome: Ongoing  Goal: Absence of new skin breakdown  Description: Absence of new skin breakdown  Outcome: Ongoing

## 2021-09-22 NOTE — DISCHARGE INSTR - COC
Continuity of Care Form    Patient Name: Olga Martinez   :  1945  MRN:  [id number]    Admit date:  2021  Discharge date:  21    Code Status Order: Full Code   Advance Directives:      Admitting Physician:  Karen Herrera MD  PCP: Scooter Daly MD    Discharging Nurse: [facility] Unit/Room#: 0226/0226-01  Discharging Unit Phone Number: [phone]    Emergency Contact:   Extended Emergency Contact Information  Primary Emergency Contact: Govind Miramontes  Address: [place] [address]           Noreen Leon [address] [facility] [address]  Phone: [phone]  Work Phone: [phone]  Mobile Phone: [phone]  Relation: Spouse  Secondary Emergency Contact: [address]ina [address] of [employer] [facility] Phone: [phone]  Relation: Child    Past Surgical History:  Past Surgical History:   Procedure Laterality Date    APPENDECTOMY      CATARACT REMOVAL          CHOLECYSTECTOMY          COLONOSCOPY  2015    polyp    ENDOSCOPY, COLON, DIAGNOSTIC  2011    bx stomach polyp    HIP SURGERY Right 2020    RIGHT PERCUTANEOUS HIP PINNING (Right Hip) w./ Dr Patsy Storey 20    HIP SURGERY Right 2020    RIGHT PERCUTANEOUS HIP PINNING performed by Delgado Cunningham DO at [facility] 71      THYROIDECTOMY      78    TONSILLECTOMY      UPPER GASTROINTESTINAL ENDOSCOPY      UPPER GASTROINTESTINAL ENDOSCOPY  2015    gastric polyp       Immunization History: There is no immunization history on file for this patient.     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    Migraine G43.909    Arrhythmia I49.9    Gout M10.9    Diarrhea R19.7    Ileus (HCC) K56.7    PAF (paroxysmal atrial fibrillation) (HCC) I48.0    N&V (nausea and vomiting) R11.2    Anemia D64.9    Hypokalemia E87.6    Vomiting and diarrhea R11.10, R19.7    Partial small bowel obstruction (HCC) K56.600    GERD (gastroesophageal reflux disease) K21.9    Thyroid disease E07.9    Small bowel obstruction ([facility] K56.609    Chest pain R07.9    HTN (hypertension), malignant I10    Hypertensive left ventricular hypertrophy, without heart failure S42.5    Diastolic dysfunction N58.16    Closed right hip fracture, initial encounter ([facility] S72.001A    Dementia without behavioral disturbance (HCC) F03.90    MANDY (acute kidney injury) ([facility] N17.9    UTI (urinary tract infection) N39.0    Altered mental status, unspecified R41.82    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, F02.81       Isolation/Infection:   Isolation            No Isolation          Patient Infection Status       Infection Onset Added Last Indicated Last Indicated By Review Planned Expiration Resolved Resolved By    None active    Resolved    COVID-19 Rule Out 09/20/21 09/20/21 09/21/21 COVID-19 & Influenza Combo (Ordered)   09/21/21 Rule-Out Test Resulted    COVID-19 Rule Out 07/06/20 07/06/20 07/06/20 COVID-19 (Ordered)   07/06/20 Rule-Out Test Resulted    COVID-19 Rule Out 06/30/20 06/30/20 06/30/20 COVID-19 (Ordered)   06/30/20 Rule-Out Test Resulted            Nurse Assessment:  Last Vital Signs: BP (!) 205/93   Pulse 94   Temp 99.4 °F (37.4 °C) (Oral)   Resp 18   Ht 5' 3\" (1.6 m)   Wt 101 lb 1.6 oz (45.9 kg)   SpO2 97%   BMI 17.91 kg/m²     Last documented pain score (0-10 scale):    Last Weight:   Wt Readings from Last 1 Encounters:   09/21/21 101 lb 1.6 oz (45.9 kg)     Mental Status:  disoriented    IV Access:  - None    Nursing Mobility/ADLs:  Walking   Dependent  Transfer  Dependent  Bathing  Dependent  Dressing  Dependent  Toileting  Dependent  Feeding  Dependent  Med Admin  Dependent  Med Delivery   crushed    Wound Care Documentation and Therapy:        Elimination:  Continence:   · Bowel: No  · Bladder: No  Urinary Catheter: None Colostomy/Ileostomy/Ileal Conduit: No       Date of Last BM: prior to admission    Intake/Output Summary (Last 24 hours) at 9/22/2021 1432  Last data filed at 9/22/2021 1255  Gross per 24 hour   Intake 50 ml   Output 1800 ml   Net -1750 ml     I/O last 3 completed shifts:  In: -   Out: 1200 [Urine:1200]    Safety Concerns: At Risk for Falls    Impairments/Disabilities:      None    Nutrition Therapy:  Current Nutrition Therapy:   - Oral Diet:  General    Routes of Feeding: Oral  Liquids: Thin Liquids  Daily Fluid Restriction: no  Last Modified Barium Swallow with Video (Video Swallowing Test): not done    Treatments at the Time of Hospital Discharge:   Respiratory Treatments:   Oxygen Therapy:  is not on home oxygen therapy. Ventilator:    - No ventilator support    Rehab Therapies: Physical Therapy and Occupational Therapy  Weight Bearing Status/Restrictions: No weight bearing restirctions  Other Medical Equipment (for information only, NOT a DME order):  wheelchair  Other Treatments:     Patient's personal belongings (please select all that are sent with patient):  pants,shirt socks    RN SIGNATURE:  Electronically signed by Orlando Flor RN on 9/23/21 at 10:37 AM EDT    CASE MANAGEMENT/SOCIAL WORK SECTION    Inpatient Status Date: 9/21/21    Readmission Risk Assessment Score:  Readmission Risk              Risk of Unplanned Readmission:  14           Discharging to Facility/ Agency   · Name: [facility]  · Phone: [phone]  · Fax: [phone]      / signature: Electronically signed by Laya Foy RN on 9/23/21 at 11:07 AM EDT    PHYSICIAN SECTION    Prognosis: Fair    Condition at Discharge: Stable    Rehab Potential (if transferring to Rehab):  Fair    Recommended Labs or Other Treatments After Discharge: PT and OT    Physician Certification: I certify the above information and transfer of Carlos Haque  is necessary for the continuing treatment of the diagnosis listed and that she requires [place] for less 30 days.      Update Admission H&P: No change in H&P    PHYSICIAN SIGNATURE:  Electronically signed by Rimma Juarez MD on 9/22/21 at 2:33 PM EDT

## 2021-09-22 NOTE — PROGRESS NOTES
Pure wick leaked. Full bed linen and gown change. New pure wick placed. Scheduled meds given per orders. See MAR. Patient denies needs at this time.

## 2021-09-22 NOTE — FLOWSHEET NOTE
9.22.21/Helped spouse get in touch with the nurse. 09/22/21 1631   Encounter Summary   Services provided to: Patient and family together   Referral/Consult From: [address] [place] [address]; Family members   Continue Visiting   (9.22. 21Spiritual Support to spouse)   Complexity of Encounter Moderate   Length of Encounter 15 minutes

## 2021-09-22 NOTE — CARE COORDINATION
INTERDISCIPLINARY PLAN OF CARE CONFERENCE    Date/Time: 9/22/2021 2:06 PM  Completed by: Anayeli Malcolm RN, Case Management      Patient Name:  Suzy Borges  YOB: 1945  Admitting Diagnosis: UTI (urinary tract infection) [N39.0]  Acute cystitis without hematuria [N30.00]  Altered mental status, unspecified altered mental status type [R41.82]     Admit Date/Time:  9/20/2021 10:16 PM    Chart reviewed. Interdisciplinary team contacted or reviewed plan related to patient progress and discharge plans. Disciplines included Case Management, Nursing, and Dietitian. Current Status: Stable  PT/OT recommendation for discharge plan of care: SNF    Expected D/C Disposition:  Rehab  Confirmed plan with patient and/or family Yes confirmed with: (name) spouse  Met with: spoke with spouse via phone  Discharge Plan Comments:  Reviewed chart and met with pt at bedside. Pt confused. Spoke with spouse via phone and he is agreeable to STR and would like [facility]. Referral called to Dustin Ruiz who will review and return call to writer on acceptance/denial. Will cont to follow. Spoke with Dustin Ruiz at [facility] who states can accept pt for STR but not LTC d/t dementia. Discussed with spouse   And would like to check [facility]. Spoke with Sydni Mishra who after review states unable to accepl Discussed with spouse and will plan for STR at [facility]. Shaunna aware of DC . Will cont to follow.     Home O2 in place on admit: No  Pt informed of need to bring portable home O2 tank on day of discharge for nursing to connect prior to leaving:  Not Indicated  Verbalized agreement/Understanding:  Not Indicated

## 2021-09-22 NOTE — DISCHARGE SUMMARY
Name:  Edwin Rizzo  Room:  0226/0226-01  MRN:    [id number]    Discharge Summary      This discharge summary is in conjunction with a complete physical exam done on the day of discharge. Discharging Physician: Chloé Parham MD       Admit: 9/20/2021  Discharge:   9/23/2021     Diagnoses this Admission    Principal Problem:    Altered mental status, unspecified  Active Problems:    Hypertension, uncontrolled    PAF (paroxysmal atrial fibrillation) (HCC)    UTI (urinary tract infection)    Late onset Alzheimer's dementia with behavioral disturbance ([facility] [address]  Resolved Problems:    * No resolved hospital problems. *        Procedures (Please Review Full Report for Details)    N/a     Consults    Psychiatry     HPI:    The patient is a 64 y.o. female with PMH of HTN, PVD, Afib, lover disease, GERD, glaucoma, migraine, gout, thyroid disease, and Follicular lymphoma -followed by Dr. Alexa Pyle and dementia who presents to [facility] with concerns for worsening confusion and AMS to the point of not being able to take care of her self or even walk. Pt is confused to situation and time. She thought she fell and broke her hip and was at the hispital to get it fixed- this happened in 2020. Pt stated she is urinating a lot. Denies any chest pain, shortness of breath, dizziness, fever, or chills. Physical Exam at Discharge:  BP (!) 222/111   Pulse 97   Temp 98.7 °F (37.1 °C) (Oral)   Resp 16   Ht 5' 3\" (1.6 m)   Wt 101 lb 1.6 oz (45.9 kg)   SpO2 98%   BMI 17.91 kg/m²       Gen: No distress. Alert. Elderly white female. She thinks she is in charge. Eyes: PERRL. No sclera icterus. No conjunctival injection. ENT: No discharge. Pharynx clear. Neck: Trachea midline. Normal thyroid. Resp: No accessory muscle use. No crackles. No wheezes. No rhonchi. No dullness on percussion. CV: Regular rate. Regular rhythm. No murmur or rub. No edema. GI: Non-tender. Non-distended. No masses.  No organomegaly. Normal bowel sounds. No hernia. Skin: Warm and dry. No nodule on exposed extremities. No rash on exposed extremities. Lymph: No cervical LAD. No supraclavicular LAD. M/S: No cyanosis. No joint deformity. No clubbing. Neuro: Awake. Reflexes 2+ symmetric bilaterally. Moves all 4 extremities, non focal  Psych: Oriented x 0. Appears mildly anxious. Hospital Course  AMS  - CT head noted with no acute intracranial abnormality. 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 Pt to follow up outpatient with neurology or geriatric psychiatry for full outpatient dementia work-up.     UTI ruled out  - pt with multiple allergies. Given x1 dose of Macrobid in ER  - WBC 11.8 procalcitonin 0.48  - Started on Levaquin D#2-- culture less than 10,000 E coli. No further treatment.     HTN  - blood pressure not controlled  -noted not controlled in the past admissions as well as high as 200s on admission  - patient reports intolerance to several medications per allergy list    - was given x1 dose Toprol 50 mg in ER  - continue home medications Losartan 100 mg, Toprol XL 25 mg, minoxidil  - monitor closely   - nursing spoke with  and stated that her blood pressure runs high all the time, she follows with cardiology and stated he doesn't want any medication changes at this time     Dementia  - nursing spoke with , appears that her dementia has been getting worse  - psych consult  - monitor      Hx of Follicular Lymphoma  - follows with Dr. Иван Plummer outpatient, last appointment 5/21     GERD  - continue PPI     PAF  - not on AC 2/2 GI bleeding  - continue flecainide and metoprolol      Migraines  - continue Elavil at night   - discontinued at discharge per psychiatry reccomendations      CBC:   Recent Labs     09/21/21 0030 09/22/21 0545   WBC 11.8* 10.2   HGB 13.6 12.9   HCT 39.1 37.8   MCV 90.6 91.8    140     BMP:   Recent Labs     09/21/21 0030 09/22/21  0545    141   K 3.7 3.3*    103   CO2 22 24   BUN 24* 16   CREATININE 1.1 0.8     LIVER PROFILE:   Recent Labs     09/21/21 0030 09/22/21 0545   AST 18 19   ALT 9* 10   LIPASE 15.0  --    BILIDIR  --  <0.2   BILITOT 0.8 0.9   ALKPHOS 157* 150*     PT/INR:   Recent Labs     09/21/21 0030   PROTIME 13.0*   INR 1.14*     APTT: No results for input(s): APTT in the last 72 hours. UA:  Recent Labs     09/21/21 0227   COLORU Yellow   PHUR 5.5   WBCUA 6-9*   RBCUA 3-4   BACTERIA 1+*   CLARITYU Clear   SPECGRAV 1.020   LEUKOCYTESUR TRACE*   UROBILINOGEN 0.2   BILIRUBINUR MODERATE*   BLOODU SMALL*   GLUCOSEU Negative       XR HIP LEFT (2-3 VIEWS)   Final Result   Negative radiographs of left hip. XR CHEST PORTABLE   Final Result   No acute process. CT Head WO Contrast   Final Result   No acute intracranial abnormality. 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 Please note that MRI would be more   sensitive for evaluation of acute infarction. Discharge Medications     Medication List      START taking these medications    melatonin 5 MG Tbdp disintegrating tablet  Take 1 tablet by mouth nightly as needed (insomnia)        CONTINUE taking these medications    Benicar 40 MG tablet  Generic drug: olmesartan     brimonidine 0.15 % ophthalmic solution  Commonly known as: ALPHAGAN P     flecainide 50 MG tablet  Commonly known as: TAMBOCOR     metoprolol succinate 50 MG extended release tablet  Commonly known as: TOPROL XL     minoxidil 2.5 MG tablet  Commonly known as: LONITEN  Take 1 tablet by mouth 2 times daily for 90 days.      omeprazole 20 MG delayed release capsule  Commonly known as: PRILOSEC ondansetron 4 MG disintegrating tablet  Commonly known as: Zofran ODT  Take 1 tablet by mouth every 8 hours as needed for Nausea or Vomiting     timolol 0.5 % ophthalmic gel-forming  Commonly known as: TIMOPTIC-XE        STOP taking these medications    amitriptyline 10 MG tablet  Commonly known as: ELAVIL           Where to Get Your Medications      Information about where to get these medications is not yet available    Ask your nurse or doctor about these medications  · melatonin 5 MG Tbdp disintegrating tablet           Discharge Condition/Location: Stable to SNF    Follow Up:   Follow up with PCP and neurology or geriatric psychiatry  for outpatient dementia workup      More than 30 mts spent       Rochelle Young MD 9/23/2021 8:45 AM

## 2021-09-23 VITALS
HEART RATE: 97 BPM | RESPIRATION RATE: 16 BRPM | TEMPERATURE: 98.7 F | SYSTOLIC BLOOD PRESSURE: 189 MMHG | OXYGEN SATURATION: 98 % | HEIGHT: 63 IN | BODY MASS INDEX: 17.91 KG/M2 | DIASTOLIC BLOOD PRESSURE: 100 MMHG | WEIGHT: 101.1 LBS

## 2021-09-23 PROCEDURE — 6360000002 HC RX W HCPCS: Performed by: HOSPITALIST

## 2021-09-23 PROCEDURE — 2580000003 HC RX 258: Performed by: HOSPITALIST

## 2021-09-23 PROCEDURE — 6370000000 HC RX 637 (ALT 250 FOR IP): Performed by: HOSPITALIST

## 2021-09-23 PROCEDURE — 99239 HOSP IP/OBS DSCHRG MGMT >30: CPT | Performed by: INTERNAL MEDICINE

## 2021-09-23 RX ADMIN — LOSARTAN POTASSIUM 100 MG: 100 TABLET, FILM COATED ORAL at 08:37

## 2021-09-23 RX ADMIN — FLECAINIDE ACETATE 50 MG: 100 TABLET ORAL at 08:37

## 2021-09-23 RX ADMIN — TIMOLOL MALEATE 1 DROP: 5 SOLUTION OPHTHALMIC at 08:38

## 2021-09-23 RX ADMIN — Medication 10 ML: at 08:38

## 2021-09-23 RX ADMIN — ENOXAPARIN SODIUM 40 MG: 40 INJECTION SUBCUTANEOUS at 08:37

## 2021-09-23 RX ADMIN — PANTOPRAZOLE SODIUM 40 MG: 40 TABLET, DELAYED RELEASE ORAL at 08:37

## 2021-09-23 RX ADMIN — MINOXIDIL 2.5 MG: 2.5 TABLET ORAL at 08:37

## 2021-09-23 RX ADMIN — METOPROLOL SUCCINATE 25 MG: 25 TABLET, EXTENDED RELEASE ORAL at 08:37

## 2021-09-23 RX ADMIN — BRIMONIDINE TARTRATE 1 DROP: 2 SOLUTION OPHTHALMIC at 08:38

## 2021-09-23 RX ADMIN — SODIUM CHLORIDE: 9 INJECTION, SOLUTION INTRAVENOUS at 05:41

## 2021-09-23 NOTE — CARE COORDINATION
DISCHARGE ORDER  Date/Time 2021 11:19 AM  Completed by: Taqueria Alcala, RN, Case Management    Patient Name: Carolynn Bobby    : 1945      Admit order Date and Status:21 inpt  Noted discharge order. (verify MD's last order for status of admission/Traditional Medicare 3 [place] Inpatient qualifying stay required for SNF)    Confirmed discharge plan with:              Patient:  Yes              When pt confirms DC plan does any support person need to be contacted by CM Yes if yes who spouse                      Discharge to Facility: [address] phone number for staff giving report: [phone]   Pre-certification completed: [facility] Exemption Notification (HENS) completed: Yes   Discharge orders and Continuity of Care faxed to facility:  Yes      Transportation:               Medical Transport explained with choice list offered to pt/family. Choice:(no preference)  Agency used: [employer]   time:       11:13-11:45  Pt/family/Nursing/Facility aware of  time:   Yes Names: [employer] charge, Carrol Lozano, [address] , pt, pt spouse and Ruddy Paris at [employer] form completed:  Yes:      Comments: Order for dc- noted. Spoke with Ruddy Paris at: [facility] who states can accept today for MITZI Maza aware of pu time. Spoke with pt spouse re: dc and  and plan remains for [facility] for [employer]. Chart reviewed and no other dc needs identified. Pt is being d/c'd to SNF today. Pt's O2 sats are 98% on RA. Discharge timeout done with nsg, CM, pt, Souse. All discharge needs and concerns addressed. Discharging nurse to complete DARREN, reconcile AVS, and place final copy with patient's discharge packet. Discharging RN to ensure that written prescriptions for  Level II medications are sent with patient to the facility as per protocol.

## 2021-09-23 NOTE — PROGRESS NOTES
Patient decided she would take her PM medications after all. Patient is now pleasant and cooperative; reporting neck pain. respirs e/e, hob up, call light in reach.

## 2021-09-23 NOTE — PROGRESS NOTES
/115 after vasotec given. Will continue to encourage patient to take her oral medications. Call light in reach.

## 2021-09-23 NOTE — PROGRESS NOTES
4 Eyes Skin Assessment     The patient is being assess for   Transfer to New Unit    I agree that 2 RN's have performed a thorough Head to Toe Skin Assessment on the patient. ALL assessment sites listed below have been assessed. Areas assessed for pressure by both nurses:   [x]   Head, Face, and Ears   [x]   Shoulders, Back, and Chest, Abdomen  [x]   Arms, Elbows, and Hands   [x]   Coccyx, Sacrum, and Ischium  [x]   Legs, Feet, and Heels  Scattered bruising      Skin Assessed Under all Medical Devices by both nurses:  none              All Mepilex Borders were peeled back and area peeked at by both nurses:  No: none  Please list where Mepilex Borders are located:  none             **SHARE this note so that the co-signing nurse is able to place an eSignature**    Co-signer eSignature: Electronically signed by Breanne Street RN on 4/60/35 at 11:05 AM EDT    Does the Patient have Skin Breakdown related to pressure?   No              Joel Prevention initiated:  No   Wound Care Orders initiated:  No      [facility] nurse consulted for Pressure Injury (Stage 3,4, Unstageable, DTI, NWPT, Complex wounds)and New or Established Ostomies:  No      Primary Nurse eSignature: Electronically signed by Dannie Gilford, RN on 9/23/21 at 10:41 AM EDT

## 2021-09-23 NOTE — PLAN OF CARE
Problem: Falls - Risk of:  Goal: Will remain free from falls  Description: Will remain free from falls  Outcome: Completed     Problem: Falls - Risk of:  Goal: Absence of physical injury  Description: Absence of physical injury  Outcome: Completed     Problem: Confusion - Acute:  Goal: Absence of continued neurological deterioration signs and symptoms  Description: Absence of continued neurological deterioration signs and symptoms  Outcome: Completed     Problem: Confusion - Acute:  Goal: Mental status will be restored to baseline  Description: Mental status will be restored to baseline  Outcome: Completed     Problem: Discharge Planning:  Goal: Ability to perform activities of daily living will improve  Description: Ability to perform activities of daily living will improve  Outcome: Completed     Problem: Discharge Planning:  Goal: Participates in care planning  Description: Participates in care planning  Outcome: Completed     Problem: Injury - Risk of, Physical Injury:  Goal: Will remain free from falls  Description: Will remain free from falls  Outcome: Completed     Problem: Injury - Risk of, Physical Injury:  Goal: Absence of physical injury  Description: Absence of physical injury  Outcome: Completed     Problem: Mood - Altered:  Goal: Mood stable  Description: Mood stable  Outcome: Completed     Problem: Mood - Altered:  Goal: Absence of abusive behavior  Description: Absence of abusive behavior  Outcome: Completed     Problem: Mood - Altered:  Goal: Verbalizations of feeling emotionally comfortable while being cared for will increase  Description: Verbalizations of feeling emotionally comfortable while being cared for will increase  Outcome: Completed     Problem: Psychomotor Activity - Altered:  Goal: Absence of psychomotor disturbance signs and symptoms  Description: Absence of psychomotor disturbance signs and symptoms  Outcome: Completed     Problem: Sensory Perception - Impaired:  Goal: Demonstrations of improved sensory functioning will increase  Description: Demonstrations of improved sensory functioning will increase  Outcome: Completed     Problem: Sensory Perception - Impaired:  Goal: Decrease in sensory misperception frequency  Description: Decrease in sensory misperception frequency  Outcome: Completed     Problem: Sensory Perception - Impaired:  Goal: Able to refrain from responding to false sensory perceptions  Description: Able to refrain from responding to false sensory perceptions  Outcome: Completed     Problem: Sensory Perception - Impaired:  Goal: Demonstrates accurate environmental perceptions  Description: Demonstrates accurate environmental perceptions  Outcome: Completed     Problem: Sensory Perception - Impaired:  Goal: Able to interrupt nonreality-based thinking  Description: Able to interrupt nonreality-based thinking  Outcome: Completed     Problem: Sleep Pattern Disturbance:  Goal: Appears well-rested  Description: Appears well-rested  Outcome: Completed     Problem: Skin Integrity:  Goal: Will show no infection signs and symptoms  Description: Will show no infection signs and symptoms  Outcome: Completed     Problem: Skin Integrity:  Goal: Absence of new skin breakdown  Description: Absence of new skin breakdown  Outcome: Completed

## 2021-09-25 LAB
BLOOD CULTURE, ROUTINE: NORMAL
CULTURE, BLOOD 2: NORMAL

## 2021-09-27 ENCOUNTER — APPOINTMENT (OUTPATIENT)
Dept: GENERAL RADIOLOGY | Age: 76
DRG: 871 | End: 2021-09-27
Payer: MEDICARE

## 2021-09-27 ENCOUNTER — HOSPITAL ENCOUNTER (INPATIENT)
Age: 76
LOS: 10 days | Discharge: HOSPICE | DRG: 871 | End: 2021-10-07
Attending: STUDENT IN AN ORGANIZED HEALTH CARE EDUCATION/TRAINING PROGRAM | Admitting: HOSPITALIST
Payer: MEDICARE

## 2021-09-27 DIAGNOSIS — A41.9 SEPTICEMIA (HCC): Primary | ICD-10-CM

## 2021-09-27 DIAGNOSIS — E87.0 HYPERNATREMIA: ICD-10-CM

## 2021-09-27 DIAGNOSIS — N17.9 AKI (ACUTE KIDNEY INJURY) (HCC): ICD-10-CM

## 2021-09-27 LAB
A/G RATIO: 0.8 (ref 1.1–2.2)
ALBUMIN SERPL-MCNC: 3.3 G/DL (ref 3.4–5)
ALP BLD-CCNC: 201 U/L (ref 40–129)
ALT SERPL-CCNC: 21 U/L (ref 10–40)
ANION GAP SERPL CALCULATED.3IONS-SCNC: 11 MMOL/L (ref 3–16)
ANION GAP SERPL CALCULATED.3IONS-SCNC: 17 MMOL/L (ref 3–16)
AST SERPL-CCNC: 56 U/L (ref 15–37)
ATYPICAL LYMPHOCYTE RELATIVE PERCENT: 1 % (ref 0–6)
BACTERIA: ABNORMAL /HPF
BANDED NEUTROPHILS RELATIVE PERCENT: 2 % (ref 0–7)
BASOPHILS ABSOLUTE: 0 K/UL (ref 0–0.2)
BASOPHILS RELATIVE PERCENT: 0 %
BILIRUB SERPL-MCNC: 1.2 MG/DL (ref 0–1)
BILIRUBIN URINE: NEGATIVE
BLOOD, URINE: NEGATIVE
BUN BLDV-MCNC: 47 MG/DL (ref 7–20)
BUN BLDV-MCNC: 51 MG/DL (ref 7–20)
CALCIUM SERPL-MCNC: 8.5 MG/DL (ref 8.3–10.6)
CALCIUM SERPL-MCNC: 9.4 MG/DL (ref 8.3–10.6)
CHLORIDE BLD-SCNC: 109 MMOL/L (ref 99–110)
CHLORIDE BLD-SCNC: 109 MMOL/L (ref 99–110)
CLARITY: CLEAR
CO2: 24 MMOL/L (ref 21–32)
CO2: 25 MMOL/L (ref 21–32)
COLOR: YELLOW
CREAT SERPL-MCNC: 1.2 MG/DL (ref 0.6–1.2)
CREAT SERPL-MCNC: 1.5 MG/DL (ref 0.6–1.2)
EKG ATRIAL RATE: 113 BPM
EKG DIAGNOSIS: NORMAL
EKG P AXIS: 78 DEGREES
EKG P-R INTERVAL: 152 MS
EKG Q-T INTERVAL: 330 MS
EKG QRS DURATION: 80 MS
EKG QTC CALCULATION (BAZETT): 452 MS
EKG R AXIS: 36 DEGREES
EKG T AXIS: 181 DEGREES
EKG VENTRICULAR RATE: 113 BPM
EOSINOPHILS ABSOLUTE: 0 K/UL (ref 0–0.6)
EOSINOPHILS RELATIVE PERCENT: 0 %
EPITHELIAL CELLS, UA: ABNORMAL /HPF (ref 0–5)
GFR AFRICAN AMERICAN: 41
GFR AFRICAN AMERICAN: 52
GFR NON-AFRICAN AMERICAN: 34
GFR NON-AFRICAN AMERICAN: 43
GLOBULIN: 3.9 G/DL
GLUCOSE BLD-MCNC: 153 MG/DL (ref 70–99)
GLUCOSE BLD-MCNC: 362 MG/DL (ref 70–99)
GLUCOSE URINE: NEGATIVE MG/DL
HCT VFR BLD CALC: 40.5 % (ref 36–48)
HEMOGLOBIN: 13.7 G/DL (ref 12–16)
KETONES, URINE: NEGATIVE MG/DL
LACTIC ACID, SEPSIS: 0.7 MMOL/L (ref 0.4–1.9)
LACTIC ACID, SEPSIS: 1 MMOL/L (ref 0.4–1.9)
LEUKOCYTE ESTERASE, URINE: ABNORMAL
LYMPHOCYTES ABSOLUTE: 1.4 K/UL (ref 1–5.1)
LYMPHOCYTES RELATIVE PERCENT: 8 %
MCH RBC QN AUTO: 30.3 PG (ref 26–34)
MCHC RBC AUTO-ENTMCNC: 33.7 G/DL (ref 31–36)
MCV RBC AUTO: 89.8 FL (ref 80–100)
MICROSCOPIC EXAMINATION: YES
MONOCYTES ABSOLUTE: 1.1 K/UL (ref 0–1.3)
MONOCYTES RELATIVE PERCENT: 7 %
NEUTROPHILS ABSOLUTE: 12.6 K/UL (ref 1.7–7.7)
NEUTROPHILS RELATIVE PERCENT: 82 %
NITRITE, URINE: NEGATIVE
PDW BLD-RTO: 14.4 % (ref 12.4–15.4)
PH UA: 5.5 (ref 5–8)
PLATELET # BLD: 235 K/UL (ref 135–450)
PMV BLD AUTO: 9.2 FL (ref 5–10.5)
POTASSIUM SERPL-SCNC: 3 MMOL/L (ref 3.5–5.1)
POTASSIUM SERPL-SCNC: 3.3 MMOL/L (ref 3.5–5.1)
PROCALCITONIN: 0.5 NG/ML (ref 0–0.15)
PROTEIN UA: NEGATIVE MG/DL
RBC # BLD: 4.51 M/UL (ref 4–5.2)
RBC UA: ABNORMAL /HPF (ref 0–4)
SODIUM BLD-SCNC: 144 MMOL/L (ref 136–145)
SODIUM BLD-SCNC: 151 MMOL/L (ref 136–145)
SPECIFIC GRAVITY UA: 1.01 (ref 1–1.03)
TOTAL PROTEIN: 7.2 G/DL (ref 6.4–8.2)
TROPONIN: 0.01 NG/ML
TROPONIN: 0.01 NG/ML
URINE REFLEX TO CULTURE: ABNORMAL
URINE TYPE: ABNORMAL
UROBILINOGEN, URINE: 1 E.U./DL
WBC # BLD: 15 K/UL (ref 4–11)
WBC UA: ABNORMAL /HPF (ref 0–5)

## 2021-09-27 PROCEDURE — 93005 ELECTROCARDIOGRAM TRACING: CPT | Performed by: EMERGENCY MEDICINE

## 2021-09-27 PROCEDURE — 80053 COMPREHEN METABOLIC PANEL: CPT

## 2021-09-27 PROCEDURE — 6370000000 HC RX 637 (ALT 250 FOR IP)

## 2021-09-27 PROCEDURE — 96360 HYDRATION IV INFUSION INIT: CPT

## 2021-09-27 PROCEDURE — 83605 ASSAY OF LACTIC ACID: CPT

## 2021-09-27 PROCEDURE — 71045 X-RAY EXAM CHEST 1 VIEW: CPT

## 2021-09-27 PROCEDURE — 6360000002 HC RX W HCPCS: Performed by: HOSPITALIST

## 2021-09-27 PROCEDURE — 6370000000 HC RX 637 (ALT 250 FOR IP): Performed by: HOSPITALIST

## 2021-09-27 PROCEDURE — 1200000000 HC SEMI PRIVATE

## 2021-09-27 PROCEDURE — 87641 MR-STAPH DNA AMP PROBE: CPT

## 2021-09-27 PROCEDURE — 87086 URINE CULTURE/COLONY COUNT: CPT

## 2021-09-27 PROCEDURE — 2580000003 HC RX 258: Performed by: STUDENT IN AN ORGANIZED HEALTH CARE EDUCATION/TRAINING PROGRAM

## 2021-09-27 PROCEDURE — 2580000003 HC RX 258: Performed by: HOSPITALIST

## 2021-09-27 PROCEDURE — U0005 INFEC AGEN DETEC AMPLI PROBE: HCPCS

## 2021-09-27 PROCEDURE — 87040 BLOOD CULTURE FOR BACTERIA: CPT

## 2021-09-27 PROCEDURE — 81001 URINALYSIS AUTO W/SCOPE: CPT

## 2021-09-27 PROCEDURE — 93010 ELECTROCARDIOGRAM REPORT: CPT | Performed by: INTERNAL MEDICINE

## 2021-09-27 PROCEDURE — 84484 ASSAY OF TROPONIN QUANT: CPT

## 2021-09-27 PROCEDURE — U0003 INFECTIOUS AGENT DETECTION BY NUCLEIC ACID (DNA OR RNA); SEVERE ACUTE RESPIRATORY SYNDROME CORONAVIRUS 2 (SARS-COV-2) (CORONAVIRUS DISEASE [COVID-19]), AMPLIFIED PROBE TECHNIQUE, MAKING USE OF HIGH THROUGHPUT TECHNOLOGIES AS DESCRIBED BY CMS-2020-01-R: HCPCS

## 2021-09-27 PROCEDURE — 84145 PROCALCITONIN (PCT): CPT

## 2021-09-27 PROCEDURE — 99285 EMERGENCY DEPT VISIT HI MDM: CPT

## 2021-09-27 PROCEDURE — 73100 X-RAY EXAM OF WRIST: CPT

## 2021-09-27 PROCEDURE — 6370000000 HC RX 637 (ALT 250 FOR IP): Performed by: STUDENT IN AN ORGANIZED HEALTH CARE EDUCATION/TRAINING PROGRAM

## 2021-09-27 PROCEDURE — 8E0ZXY6 ISOLATION: ICD-10-PCS | Performed by: HOSPITALIST

## 2021-09-27 PROCEDURE — 6360000002 HC RX W HCPCS: Performed by: STUDENT IN AN ORGANIZED HEALTH CARE EDUCATION/TRAINING PROGRAM

## 2021-09-27 PROCEDURE — 85025 COMPLETE CBC W/AUTO DIFF WBC: CPT

## 2021-09-27 PROCEDURE — 73070 X-RAY EXAM OF ELBOW: CPT

## 2021-09-27 RX ORDER — 0.9 % SODIUM CHLORIDE 0.9 %
30 INTRAVENOUS SOLUTION INTRAVENOUS ONCE
Status: COMPLETED | OUTPATIENT
Start: 2021-09-27 | End: 2021-09-27

## 2021-09-27 RX ORDER — ONDANSETRON 4 MG/1
4 TABLET, ORALLY DISINTEGRATING ORAL EVERY 8 HOURS PRN
Status: DISCONTINUED | OUTPATIENT
Start: 2021-09-27 | End: 2021-10-07 | Stop reason: HOSPADM

## 2021-09-27 RX ORDER — MINOXIDIL 2.5 MG/1
2.5 TABLET ORAL 2 TIMES DAILY
Status: DISCONTINUED | OUTPATIENT
Start: 2021-09-27 | End: 2021-09-28

## 2021-09-27 RX ORDER — METOPROLOL SUCCINATE 25 MG/1
25 TABLET, EXTENDED RELEASE ORAL DAILY
COMMUNITY

## 2021-09-27 RX ORDER — PANTOPRAZOLE SODIUM 40 MG/1
40 TABLET, DELAYED RELEASE ORAL
Status: DISCONTINUED | OUTPATIENT
Start: 2021-09-27 | End: 2021-10-07 | Stop reason: HOSPADM

## 2021-09-27 RX ORDER — ACETAMINOPHEN 650 MG/1
650 SUPPOSITORY RECTAL ONCE
Status: COMPLETED | OUTPATIENT
Start: 2021-09-27 | End: 2021-09-27

## 2021-09-27 RX ORDER — SODIUM CHLORIDE 0.9 % (FLUSH) 0.9 %
5-40 SYRINGE (ML) INJECTION EVERY 12 HOURS SCHEDULED
Status: DISCONTINUED | OUTPATIENT
Start: 2021-09-27 | End: 2021-10-07 | Stop reason: HOSPADM

## 2021-09-27 RX ORDER — LABETALOL HYDROCHLORIDE 5 MG/ML
5 INJECTION, SOLUTION INTRAVENOUS ONCE
Status: COMPLETED | OUTPATIENT
Start: 2021-09-27 | End: 2021-09-28

## 2021-09-27 RX ORDER — POTASSIUM CHLORIDE 20 MEQ/1
40 TABLET, EXTENDED RELEASE ORAL ONCE
Status: COMPLETED | OUTPATIENT
Start: 2021-09-27 | End: 2021-09-27

## 2021-09-27 RX ORDER — TIMOLOL MALEATE 5 MG/ML
1 SOLUTION/ DROPS OPHTHALMIC 2 TIMES DAILY
Status: DISCONTINUED | OUTPATIENT
Start: 2021-09-27 | End: 2021-10-07 | Stop reason: HOSPADM

## 2021-09-27 RX ORDER — SODIUM CHLORIDE 9 MG/ML
25 INJECTION, SOLUTION INTRAVENOUS PRN
Status: DISCONTINUED | OUTPATIENT
Start: 2021-09-27 | End: 2021-10-07 | Stop reason: HOSPADM

## 2021-09-27 RX ORDER — DEXTROSE MONOHYDRATE 50 MG/ML
INJECTION, SOLUTION INTRAVENOUS CONTINUOUS
Status: DISCONTINUED | OUTPATIENT
Start: 2021-09-27 | End: 2021-09-27

## 2021-09-27 RX ORDER — SODIUM CHLORIDE 0.9 % (FLUSH) 0.9 %
5-40 SYRINGE (ML) INJECTION PRN
Status: DISCONTINUED | OUTPATIENT
Start: 2021-09-27 | End: 2021-10-07 | Stop reason: HOSPADM

## 2021-09-27 RX ORDER — POLYETHYLENE GLYCOL 3350 17 G/17G
17 POWDER, FOR SOLUTION ORAL DAILY PRN
Status: DISCONTINUED | OUTPATIENT
Start: 2021-09-27 | End: 2021-10-07 | Stop reason: HOSPADM

## 2021-09-27 RX ORDER — BRIMONIDINE TARTRATE 2 MG/ML
1 SOLUTION/ DROPS OPHTHALMIC 2 TIMES DAILY
Status: DISCONTINUED | OUTPATIENT
Start: 2021-09-27 | End: 2021-10-07 | Stop reason: HOSPADM

## 2021-09-27 RX ORDER — MINOXIDIL 2.5 MG/1
7.5 TABLET ORAL DAILY
Status: ON HOLD | COMMUNITY
End: 2021-10-07 | Stop reason: HOSPADM

## 2021-09-27 RX ORDER — FLECAINIDE ACETATE 100 MG/1
50 TABLET ORAL 2 TIMES DAILY
Status: DISCONTINUED | OUTPATIENT
Start: 2021-09-27 | End: 2021-10-04 | Stop reason: SDUPTHER

## 2021-09-27 RX ORDER — LOSARTAN POTASSIUM 100 MG/1
100 TABLET ORAL DAILY
Status: ON HOLD | COMMUNITY
End: 2021-10-07 | Stop reason: HOSPADM

## 2021-09-27 RX ADMIN — SODIUM CHLORIDE 1374 ML: 9 INJECTION, SOLUTION INTRAVENOUS at 07:20

## 2021-09-27 RX ADMIN — PANTOPRAZOLE SODIUM 40 MG: 40 TABLET, DELAYED RELEASE ORAL at 10:14

## 2021-09-27 RX ADMIN — METOPROLOL TARTRATE 25 MG: 25 TABLET, FILM COATED ORAL at 10:12

## 2021-09-27 RX ADMIN — VANCOMYCIN HYDROCHLORIDE 1250 MG: 10 INJECTION, POWDER, LYOPHILIZED, FOR SOLUTION INTRAVENOUS at 12:56

## 2021-09-27 RX ADMIN — ENOXAPARIN SODIUM 30 MG: 30 INJECTION SUBCUTANEOUS at 10:15

## 2021-09-27 RX ADMIN — MINOXIDIL 2.5 MG: 2.5 TABLET ORAL at 16:11

## 2021-09-27 RX ADMIN — METOPROLOL TARTRATE 25 MG: 25 TABLET, FILM COATED ORAL at 21:08

## 2021-09-27 RX ADMIN — DEXTROSE MONOHYDRATE: 50 INJECTION, SOLUTION INTRAVENOUS at 14:58

## 2021-09-27 RX ADMIN — FLECAINIDE ACETATE 50 MG: 100 TABLET ORAL at 22:49

## 2021-09-27 RX ADMIN — TIMOLOL MALEATE 1 DROP: 5 SOLUTION OPHTHALMIC at 22:47

## 2021-09-27 RX ADMIN — BRIMONIDINE TARTRATE 1 DROP: 2 SOLUTION OPHTHALMIC at 22:42

## 2021-09-27 RX ADMIN — Medication 10 ML: at 22:43

## 2021-09-27 RX ADMIN — ACETAMINOPHEN 650 MG: 650 SUPPOSITORY RECTAL at 07:37

## 2021-09-27 RX ADMIN — POTASSIUM CHLORIDE 40 MEQ: 20 TABLET, EXTENDED RELEASE ORAL at 16:38

## 2021-09-27 RX ADMIN — MEROPENEM 500 MG: 500 INJECTION, POWDER, FOR SOLUTION INTRAVENOUS at 12:19

## 2021-09-27 ASSESSMENT — PAIN SCALES - GENERAL
PAINLEVEL_OUTOF10: 0
PAINLEVEL_OUTOF10: 0
PAINLEVEL_OUTOF10: 2
PAINLEVEL_OUTOF10: 6

## 2021-09-27 NOTE — ED NOTES
Bedside report received from Chet Guy, pt is responding to staff questions. Pt is resting quietly, will continue to monitor.       Jensen Mazariegos RN  09/27/21 2969

## 2021-09-27 NOTE — ED PROVIDER NOTES
Emergency Department Triage Provider Note  Location: [address] [place] [address]  ED  9/27/2021     Patient Identification  Sonali Tapia is a 64 y.o. female      Sonali Tapia was evaluated in the Emergency Department for fatigue. Sent from nursing facility. Limited history per EMS do not have collateral history available patient denies any focal complaints. .     Physical exam revealed:  Vital signs reviewed  Gen: Alert and oriented, no acute distress  Card: Tachycardic RR, no murmurs, equal radial pulses  Resp: CBSBL, no wheezes rales or rhonchi  Abd: Soft nontender, nondistended abdomen, no guarding or rebound, no CVA tenderness  Ext: No deformities noted. There is tenderness decreased range of motion of the left wrist and the left elbow secondary to pain. Tender over the joints. Neurovascular intact distally  Neuro: Grossly normal moving extremities equally      EKG Interpretation  Ordered    Patient seen and evaluated. Relevant records reviewed. Patient here with complaints of fatigue. On arrival found to be tachycardic afebrile hypertensive systolics in the 032B. Patient has exam notable for left-sided wrist pain and some mild swelling. Will order sepsis work-up plain films of the left upper extremity. Patient's care will be followed up by oncoming physician. 5:42 AM      Although initial history and physical exam information was obtained by DRE/NPP/MD/ (who also dictated a record of this visit), I independently examined and evaluated this patient and made all diagnostic, treatment, and disposition decisions. This chart was generated in part by using Dragon Dictation system and may contain errors related to that system including errors in grammar, punctuation, and spelling, as well as words and phrases that may be inappropriate. If there are any questions or concerns please feel free to contact the dictating provider for clarification.      Anson Dukes MD   [facility] [employer] Angelito Mendez MD  09/27/21 1981

## 2021-09-27 NOTE — ED NOTES

## 2021-09-27 NOTE — CONSULTS
Pharmacy Note  Vancomycin Consult    Aggie Zamora is a 64 y.o. female started on Vancomycin for Sepsis of unknown origin; consult received from Dr. Minerva Lombardi to manage therapy. Also receiving the following antibiotics: Merrem. Allergies:  Latex, Ferric carboxymaltose, Aldactone [spironolactone], Baclofen, Cardizem [diltiazem hcl], Dilaudid [hydromorphone hcl], Droperidol, Guanfacine hcl, Hydrochlorothiazide, Ibuprofen, Lasix [furosemide], Lorazepam, Nexium [esomeprazole magnesium trihydrate], Nortriptyline, Other, Phenergan [promethazine hcl], Phenergan [promethazine hcl], Phenothiazines, Pilocarpine, Prochlorperazine edisylate, Reglan [metoclopramide hcl], Rofecoxib, Tramadol, Triavil [perphenazine-amitriptyline], Ultracet [tramadol-acetaminophen], Valium, Vioxx, Zofran odt [ondansetron], Amlodipine, Amoxicillin, Augmentin [amoxicillin-pot clavulanate], Avelox [moxifloxacin], Bactrim [sulfamethoxazole-trimethoprim], Biaxin [clarithromycin], Cephalexin, Clonidine derivatives, Codeine, Coreg [carvedilol], Demerol, Hydralazine, Levaquin [levofloxacin in d5w], Maxalt [rizatriptan benzoate], Morphine and related, Scopolamine, and Zonisamide     Tmax: 98.7    Recent Labs     09/27/21  0534 09/27/21  1450   CREATININE 1.5* 1.2       Recent Labs     09/27/21  0534   WBC 15.0*       Estimated Creatinine Clearance: 28 mL/min (based on SCr of 1.2 mg/dL). No intake or output data in the 24 hours ending 09/27/21 1548    Wt Readings from Last 1 Encounters:   09/27/21 101 lb (45.8 kg)         Body mass index is 17.89 kg/m². Loading dose (critically ill or in ICU, require dialysis or renal replacement therapy): Vancomycin 25 mg/kg IVPB x 1 (maximum 3000 mg).   Maintenance dose: 15 mg/kg (maximum: 2000 mg/dose and 4500 mg/day) starting at the next dosing interval determined by renal function  Pulse dose: fluctuating renal function, MANDY, ESRD   Goal Vancomycin trough: 10-15 mcg/mL or 15-20 mcg/mL   Goal Vancomycin AUC: 400-600     Assessment/Plan:  Will initiate Vancomycin with a one time loading dose of 1250 mg x1, followed by 750 mg IV every 24 hours. Calculated . Vancomycin trough ordered for 9/29 1300. Timing of trough level will be determined based on culture results, renal function, and clinical response. Thank you for the consult.   Fam Parker, PharmD  9/27/2021 at 3:53 PM

## 2021-09-27 NOTE — ED NOTES
Pt repositioned for comfort. Rectal temp obtained. Oral care performed.  Amisha Rubio in place     [facility]  09/27/21 2399

## 2021-09-27 NOTE — ED NOTES
Patient given more of the ensure.   She is alert and expresses no further needs at this time     Gomez Leigh, SONNY  09/27/21 2655

## 2021-09-27 NOTE — ED NOTES
Pt is given her medicine with applesauce. She ate 2 oz of the applesauce and finished the ensure drink. Will continue to monitor.       Emily Paul RN  09/27/21 5493

## 2021-09-27 NOTE — ED NOTES
Patient is still talking to her  on the phone. She is given some drinks of the ensure. Will continue to monitor. Patient.       Migdalia Montes RN  09/27/21 7784

## 2021-09-27 NOTE — ED PROVIDER NOTES
[address] [facility]  ED      CHIEF COMPLAINT  Fatigue and leukocytosis     HISTORY OF PRESENT ILLNESS  Jose Foote is a 64 y.o. female with a past medical history of atrial fibrillation, follicular lymphoma, GERD, gout, hypertension, steatohepatitis, who presents to the ED complaining of fatigue. Patient was sent from [facility] for weakness, poor appetite and elevated white blood cell count. No reported fever. Patient has no complaints. She denies chest pain, shortness of breath, abdominal pain, vomiting, diarrhea, headache. Old records reviewed: Patient recently admitted to the hospital from 9/209/23. At that time she was evaluated for altered mental status and had a CT head that was negative. Patient is noted to have dementia. Patient was also initially treated for a urinary tract infection but then had negative culture. No other complaints, modifying factors or associated symptoms. I have reviewed the following from the nursing documentation.     Past Medical History:   Diagnosis Date    Abnormal LFT's     Arthritis     Atrial fibrillation ([facility] [address]     COVID-19 53/81/8645    Follicular lymphoma ([facility] [address]     Dr Katty Waller GERD (gastroesophageal reflux disease)     Glaucoma     Gout 11/02/2010    Hypertension     Liver disease     steatohepatitis    Migraine     Non-ulcer dyspepsia     egd neg 11/2011    Peripheral vascular disease ([facility] [address]     Thyroid disease      Past Surgical History:   Procedure Laterality Date    APPENDECTOMY      CATARACT REMOVAL      2007    CHOLECYSTECTOMY      2005    COLONOSCOPY  2/27/2015    polyp    ENDOSCOPY, COLON, DIAGNOSTIC  11/14/2011    bx stomach polyp    HIP SURGERY Right 06/30/2020    RIGHT PERCUTANEOUS HIP PINNING (Right Hip) w./ Dr Clemente Savage 6/30/20    HIP SURGERY Right 6/30/2020    RIGHT PERCUTANEOUS HIP PINNING performed by Tracy Zazueta DO at [address] [place] [address]    TONSILLECTOMY      UPPER GASTROINTESTINAL ENDOSCOPY      UPPER GASTROINTESTINAL ENDOSCOPY  2/27/2015    gastric polyp     Family History   Problem Relation Age of Onset    Heart Disease Mother     Cancer Sister     Cancer Brother      Social History     Socioeconomic History    Marital status:      Spouse name: Not on file    Number of children: Not on file    Years of education: Not on file    Highest education level: Not on file   Occupational History    Not on file   Tobacco Use    Smoking status: Never Smoker    Smokeless tobacco: Never Used   Vaping Use    Vaping Use: Never used   Substance and Sexual Activity    Alcohol use: No    Drug use: No    Sexual activity: Yes   Other Topics Concern    Not on file   Social History Narrative    Not on file     Social Determinants of Health     Financial Resource Strain:     Difficulty of Paying Living Expenses:    Food Insecurity:     Worried About Running Out of Food in the Last Year:     Ran Out of Food in the Last Year:    Transportation Needs:     Lack of Transportation (Medical):      Lack of Transportation (Non-Medical):    Physical Activity:     Days of Exercise per Week:     Minutes of Exercise per Session:    Stress:     Feeling of Stress :    Social Connections:     Frequency of Communication with Friends and Family:     Frequency of Social Gatherings with Friends and Family:     Attends Yarsanism Services:     Active Member of Clubs or Organizations:     Attends Club or Organization Meetings:     Marital Status:    Intimate Partner Violence:     Fear of Current or Ex-Partner:     Emotionally Abused:     Physically Abused:     Sexually Abused:      Current Facility-Administered Medications   Medication Dose Route Frequency Provider Last Rate Last Admin    dextrose 5 % and 0.45 % sodium chloride infusion   IntraVENous Continuous Demond Irby MD        labetalol (NORMODYNE;TRANDATE) injection 10 mg  10 mg IntraVENous Q4H PRN Emy Roman MD   10 mg at 09/29/21 1224    metoprolol tartrate (LOPRESSOR) tablet 100 mg  100 mg Oral BID Joanne Sánchez MD   100 mg at 10/02/21 1024    amLODIPine (NORVASC) tablet 10 mg  10 mg Oral Daily Joanne Sánchez MD   10 mg at 10/02/21 1024    minoxidil (LONITEN) tablet 5 mg  5 mg Oral BID Emy Roman MD   5 mg at 10/02/21 1024    flecainide (TAMBOCOR) tablet 50 mg  50 mg Oral BID Emy Roman MD   50 mg at 10/02/21 1024    timolol (TIMOPTIC) 0.5 % ophthalmic solution 1 drop  1 drop Both Eyes BID Emy Roman MD   1 drop at 10/02/21 1023    brimonidine (ALPHAGAN) 0.2 % ophthalmic solution 1 drop  1 drop Both Eyes BID Emy Roman MD   1 drop at 10/02/21 1023    sodium chloride flush 0.9 % injection 5-40 mL  5-40 mL IntraVENous 2 times per day Emy Roman MD   10 mL at 10/02/21 1022    sodium chloride flush 0.9 % injection 5-40 mL  5-40 mL IntraVENous PRN Emy Roman MD        0.9 % sodium chloride infusion  25 mL IntraVENous PRN Emy Roman  mL/hr at 10/02/21 1341 25 mL at 10/02/21 1341    enoxaparin (LOVENOX) injection 30 mg  30 mg SubCUTAneous Daily Emy Roman MD   30 mg at 10/02/21 1022    polyethylene glycol (GLYCOLAX) packet 17 g  17 g Oral Daily PRN Emy Roman MD        pantoprazole (PROTONIX) tablet 40 mg  40 mg Oral QAM AC Emy Roman MD   40 mg at 10/01/21 0556    ondansetron (ZOFRAN-ODT) disintegrating tablet 4 mg  4 mg Oral Q8H PRN Emy Roman MD        meropenem (MERREM) 500 mg IVPB (mini-bag)  500 mg IntraVENous Q12H Emy Roman MD   Stopped at 10/02/21 1505     Allergies   Allergen Reactions    Latex Hives    Ferric Carboxymaltose Anaphylaxis    Aldactone [Spironolactone]     Baclofen Other (See Comments)     Dizziness, hallucinations    Cardizem [Diltiazem Hcl]      Increases liver enzymes    Dilaudid [Hydromorphone Hcl] Other (See Comments)     Vomitting, and dizziness    Droperidol     Guanfacine Hcl Itching     Night terrors,insomnia,extreme dizziness    Hydrochlorothiazide      Causes gout    Ibuprofen Hives    Lasix [Furosemide]     Lorazepam     Nexium [Esomeprazole Magnesium Trihydrate] Other (See Comments)     Oral sores    Nortriptyline Other (See Comments)     Severe night terrors    Other      Flu shot    Phenergan [Promethazine Hcl]     Phenergan [Promethazine Hcl]      Tardive dyskinesia    Phenothiazines      Causes parkinson sx    Pilocarpine     Prochlorperazine Edisylate     Reglan [Metoclopramide Hcl]     Rofecoxib     Tramadol     Triavil [Perphenazine-Amitriptyline]      Parkinson sxs    Ultracet [Tramadol-Acetaminophen] Other (See Comments)     dizziness    Valium     Vioxx Other (See Comments)     Facial and lip swelling    Zofran Odt [Ondansetron]      Zofran PO is ok    Amlodipine Itching, Nausea And Vomiting and Swelling    Amoxicillin Rash    Augmentin [Amoxicillin-Pot Clavulanate] Itching and Rash    Avelox [Moxifloxacin] Rash    Bactrim [Sulfamethoxazole-Trimethoprim] Nausea And Vomiting     And thrush/yeast unfection    Biaxin [Clarithromycin] Itching and Rash    Cephalexin Rash    Clonidine Derivatives Anxiety    Codeine Nausea And Vomiting and Rash    Coreg [Carvedilol] Rash    Demerol Nausea And Vomiting    Hydralazine Itching, Swelling and Rash    Levaquin [Levofloxacin In D5w] Nausea And Vomiting    Maxalt [Rizatriptan Benzoate] Palpitations    Morphine And Related Rash    Scopolamine Nausea And Vomiting     severe    Zonisamide Anxiety       REVIEW OF SYSTEMS  All systems reviewed, pertinent positives per HPI otherwise noted to be negative. PHYSICAL EXAM  BP (!) 171/115   Pulse 114   Temp 98.7 °F (37.1 °C) (Rectal)   Resp 16   Ht 5' 3\" (1.6 m)   Wt 101 lb (45.8 kg)   SpO2 97%   BMI 17.89 kg/m²    GENERAL APPEARANCE: Awake. Cooperative. no distress. HENT: Normocephalic. Atraumatic.  Mucous membranes are dry  NECK: Supple. Full range of motion of the neck without stiffness or pain. EYES: PERRL. EOM's grossly intact. HEART/CHEST: RRR. No murmurs. Chest wall is not tender to palpation. LUNGS: Respirations unlabored. CTAB. Good air exchange. Speaking comfortably in full sentences. ABDOMEN: No tenderness. Soft. Non-distended. No masses. No organomegaly. No guarding or rebound. MUSCULOSKELETAL: No extremity edema. Compartments soft. No deformity. Left upper extremity tender in the forearm, the patient has some difficulty differentiating where pain is located. No other tenderness in the extremities. All extremities neurovascularly intact. SKIN: Warm and dry. No acute rashes. Poor skin turgor. NEUROLOGICAL: Alert, appears to be at recent baseline. No gross facial drooping. Moving all extremities, sensation intact. PSYCHIATRIC: Normal mood and affect. LABS  I have reviewed all labs for this visit.    Results for orders placed or performed during the hospital encounter of 09/27/21   CBC Auto Differential   Result Value Ref Range    WBC 15.0 (H) 4.0 - 11.0 K/uL    RBC 4.51 4.00 - 5.20 M/uL    Hemoglobin 13.7 12.0 - 16.0 g/dL    Hematocrit 40.5 36.0 - 48.0 %    MCV 89.8 80.0 - 100.0 fL    MCH 30.3 26.0 - 34.0 pg    MCHC 33.7 31.0 - 36.0 g/dL    RDW 14.4 12.4 - 15.4 %    Platelets 074 495 - 717 K/uL    MPV 9.2 5.0 - 10.5 fL    Neutrophils % 82.0 %    Lymphocytes % 8.0 %    Monocytes % 7.0 %    Eosinophils % 0.0 %    Basophils % 0.0 %    Neutrophils Absolute 12.6 (H) 1.7 - 7.7 K/uL    Lymphocytes Absolute 1.4 1.0 - 5.1 K/uL    Monocytes Absolute 1.1 0.0 - 1.3 K/uL    Eosinophils Absolute 0.0 0.0 - 0.6 K/uL    Basophils Absolute 0.0 0.0 - 0.2 K/uL    Bands Relative 2 0 - 7 %    Atypical Lymphocytes Relative 1 0 - 6 %   Lactate, Sepsis   Result Value Ref Range    Lactic Acid, Sepsis 1.0 0.4 - 1.9 mmol/L   Lactate, Sepsis   Result Value Ref Range    Lactic Acid, Sepsis 0.7 0.4 - 1.9 mmol/L Urinalysis Reflex to Culture    Specimen: Urine, clean catch   Result Value Ref Range    Color, UA Yellow Straw/Yellow    Clarity, UA Clear Clear    Glucose, Ur Negative Negative mg/dL    Bilirubin Urine Negative Negative    Ketones, Urine Negative Negative mg/dL    Specific Gravity, UA 1.015 1.005 - 1.030    Blood, Urine Negative Negative    pH, UA 5.5 5.0 - 8.0    Protein, UA Negative Negative mg/dL    Urobilinogen, Urine 1.0 <2.0 E.U./dL    Nitrite, Urine Negative Negative    Leukocyte Esterase, Urine TRACE (A) Negative    Microscopic Examination YES     Urine Type NotGiven     Urine Reflex to Culture Not Indicated    Comprehensive metabolic panel   Result Value Ref Range    Sodium 151 (H) 136 - 145 mmol/L    Potassium 3.3 (L) 3.5 - 5.1 mmol/L    Chloride 109 99 - 110 mmol/L    CO2 25 21 - 32 mmol/L    Anion Gap 17 (H) 3 - 16    Glucose 153 (H) 70 - 99 mg/dL    BUN 51 (H) 7 - 20 mg/dL    CREATININE 1.5 (H) 0.6 - 1.2 mg/dL    GFR Non- 34 (A) >60    GFR  41 (A) >60    Calcium 9.4 8.3 - 10.6 mg/dL    Total Protein 7.2 6.4 - 8.2 g/dL    Albumin 3.3 (L) 3.4 - 5.0 g/dL    Albumin/Globulin Ratio 0.8 (L) 1.1 - 2.2    Total Bilirubin 1.2 (H) 0.0 - 1.0 mg/dL    Alkaline Phosphatase 201 (H) 40 - 129 U/L    ALT 21 10 - 40 U/L    AST 56 (H) 15 - 37 U/L    Globulin 3.9 g/dL   Troponin   Result Value Ref Range    Troponin 0.01 <0.01 ng/mL   Microscopic Urinalysis   Result Value Ref Range    WBC, UA 3-5 0 - 5 /HPF    RBC, UA 3-4 0 - 4 /HPF    Epithelial Cells, UA 6-10 (A) 0 - 5 /HPF    Bacteria, UA Rare (A) None Seen /HPF   COVID-19   Result Value Ref Range    SARS-CoV-2 Indeterminate Not detected   Troponin   Result Value Ref Range    Troponin 0.01 <0.01 ng/mL   Procalcitonin   Result Value Ref Range    Procalcitonin 0.50 (H) 0.00 - 0.15 ng/mL   EKG 12 Lead   Result Value Ref Range    Ventricular Rate 113 BPM    Atrial Rate 113 BPM    P-R Interval 152 ms    QRS Duration 80 ms    Q-T Interval 330 ms    QTc Calculation (Bazett) 452 ms    P Axis 78 degrees    R Axis 36 degrees    T Axis 181 degrees    Diagnosis       Baseline artifactSinus tachycardiaLeft ventricular hypertrophy with repolarization abnormalityAbnormal ECGWhen compared with ECG of 27-SEP-2021 05:41,No significant change was foundConfirmed by Sara Fuller MD, Connie Villa ([id number]) on 9/27/2021 6:25:35 PM       ECG  The Ekg interpreted by me shows  sinus tachycardia, [id number]  Axis is   Normal  QTc is  prolonged  Intervals and Durations are unremarkable. ST Segments: nonspecific changes  Change from prior EKG dated 9/20/21 and I had    RADIOLOGY    XR ELBOW LEFT (2 VIEWS)   Final Result   No acute fracture, dislocation, or sign of joint effusion. XR WRIST LEFT (2 VIEWS)   Final Result   No acute fracture or dislocation at the left wrist.         XR CHEST PORTABLE   Final Result   Strandy opacity at the left base there are present subsegmental atelectasis   rather than infiltrate. The remaining lungs are stable. ED COURSE / MDM  Patient seen and evaluated. Old records reviewed and pertinent information included in HPI. Ill appearing patient, presenting for fatigue. Physical exam remarkable for left upper extremity tenderness patient and tachycardia. Differential diagnosis includes but is not limited to: UTI, pneumonia, sepsis, electrolyte abnormality, hypoglycemia, dehydration, failure to thrive, ACS    EKG, laboratory studies, and imaging obtained. Workup showed: Workup showed:  ED Course as of Oct 02 1608   Mon Sep 27, 2021   0629 CXR: IMPRESSION:  Strandy opacity at the left base there are present subsegmental atelectasis  rather than infiltrate.   The remaining lungs are stable.  ---------  Chest x-ray without evidence of obvious pneumonia, pneumothorax, pleural effusion, pulmonary edema    [ER]   0644 XR : wrist: IMPRESSION:  No acute fracture or dislocation at the left wrist.    [ER]   0645 XR L elbow: MANDY (acute kidney injury) ([facility] [address]        Blood pressure (!) 169/94, pulse 106, temperature 98.7 °F (37.1 °C), temperature source Rectal, resp. rate 18, height 5' 3\" (1.6 m), weight 101 lb (45.8 kg), SpO2 96 %, not currently breastfeeding. DISPOSITION  Renato Lawrence was admitted in stable condition. DISCLAIMER: This chart was created using Dragon dictation software. Efforts were made by me to ensure accuracy, however some errors may be present due to limitations of this technology and occasionally words are not transcribed correctly.           Joselyn Roque MD  10/02/21 9907

## 2021-09-27 NOTE — ED NOTES
Dietitian is called to order patient some ensure to drink for nourishment.         Padmini Smith RN  09/27/21 [id number]

## 2021-09-27 NOTE — ED NOTES
Sat with patient at her bedside and fed her 1/2 of vanilla pudding. She also drank a 12 oz cup of water.        Ryan Moreno RN  09/27/21 1949

## 2021-09-27 NOTE — ED NOTES
Spoke with patient's  on the phone. He is concerned about her condition. He reports that while the patient was at [place] [address] she was not given her medicine. He states \"the nurse told me well she wont take them so its ok. I told her its not ok she needs her medicine. \" pt is re-assured that she is going to be given her medicine. He also expresses concern about her uti, I updated him that we had repeated a urinalysis on the patient. He is concerned she was not eating for past week. He reports that her blood pressure will fluctuate and that this is normal for the patient. He states \" her cardiologist and doctors are aware of this, her blood pressure is not the problem her eating and drinking and not getting her medicine is the problem. Please make sure she gets this\"  I assured him that we will take great care of her and make sure she is well cared for. He is encouraged to call back at anytime for updates. We will continue to monitor.        Kathryn Merritt RN  09/27/21 3746

## 2021-09-27 NOTE — ED NOTES
Patient continues to guard her left arm against her chest.  She is hesitant to move it away from her body. I have assisted her with moving it away from her chest and a pillow is placed under her arm and her hand is extended on the pillow. Pt has her hand clenched in a fist.  I opened her hand and placed a clean wash rag in her hand to hold it open. A warm blanket is applied. Will continue to monitor.       Collin Charles RN  09/27/21 1955

## 2021-09-27 NOTE — ED NOTES
Updated patient's family  About her condition. Answered questions. They are aware we are awaiting discharges for a bed for her.      Emily Paul RN  09/27/21 3006

## 2021-09-27 NOTE — H&P
Hospital Medicine History & Physical      PCP: Salima Cummings MD    Date of Admission: 9/27/2021    Date of Service: Pt seen/examined on 9/27/21 and Admitted to Inpatient with expected LOS greater than two midnights due to medical therapy. Chief Complaint: Fatigue, poor appetite, elevated white count      History Of Present Illness:     64 y.o. female with dementia, PAF, hypertension was sent to the emergency room from her skilled nursing facility with generalized weakness, poor appetite and elevated white blood cell count. .. Patient is a poor historian given her dementia. History is obtained from ED staff and review of records . Yeny Marie There is no reported fever , shortness of breath, cough, abdominal pain, nausea, vomiting, diarrhea, headaches, dysuria, frequency or any other complaints except for fatigue. .  In the emergency room, BP was 171/115, pulse 114, respirations 16, temperature 98. 3. . WBC was 15. . Chest x-ray showed small left lower lobe infiltrate. . Procalcitonin was elevated at 0.5. Yeny Marie Serum lactate was normal... UA showed no pyuria. . There was no clinical evidence of skin or soft tissue infection or serious infection. No other localizing infections identified. Yeny Marie She was noted to have a sodium of 152 and acutely elevated creatinine 1.5  Patient was given IV meropenem and vancomycin empirically and IV fluid bolus.     Past Medical History:          Diagnosis Date    Abnormal LFT's     Arthritis     Atrial fibrillation ([address]     Follicular lymphoma (HCC)     Dr Hannah Fry GERD (gastroesophageal reflux disease)     Glaucoma     Gout 11/2/2010    Hypertension     Liver disease     steatohepatitis    Migraine     Non-ulcer dyspepsia     egd neg 11/2011    Peripheral vascular disease ([address]     Thyroid disease        Past Surgical History:          Procedure Laterality Date    APPENDECTOMY      CATARACT REMOVAL      2007    CHOLECYSTECTOMY      2005    COLONOSCOPY  2/27/2015    polyp    ENDOSCOPY, COLON, DIAGNOSTIC  11/14/2011    bx stomach polyp    HIP SURGERY Right 06/30/2020    RIGHT PERCUTANEOUS HIP PINNING (Right Hip) w./ Dr Jeff Brown 6/30/20    HIP SURGERY Right 6/30/2020    RIGHT PERCUTANEOUS HIP PINNING performed by Anuja Hernandez DO at [facility]      THYROIDECTOMY      76    TONSILLECTOMY      UPPER GASTROINTESTINAL ENDOSCOPY      UPPER GASTROINTESTINAL ENDOSCOPY  2/27/2015    gastric polyp       Medications Prior to Admission:      Prior to Admission medications    Medication Sig Start Date End Date Taking? Authorizing Provider   losartan (COZAAR) 100 MG tablet Take 100 mg by mouth daily   Yes Historical Provider, MD   metoprolol succinate (TOPROL XL) 25 MG extended release tablet Take 25 mg by mouth daily   Yes Historical Provider, MD   minoxidil (LONITEN) 2.5 MG tablet Take 7.5 mg by mouth daily   Yes Historical Provider, MD   melatonin 5 MG TBDP disintegrating tablet Take 1 tablet by mouth nightly as needed (insomnia) 9/22/21  Yes TEA Hancock - CNP   brimonidine (ALPHAGAN P) 0.15 % ophthalmic solution Place 1 drop into both eyes 2 times daily Med listed as \"[place]rimon 0.15%\" on Pt's home meds list. Pt's son believes that is the correct eye drop.    Yes Historical Provider, MD   flecainide (TAMBOCOR) 50 MG tablet Take 50 mg by mouth 2 times daily   Yes Historical Provider, MD   ondansetron (ZOFRAN ODT) 4 MG disintegrating tablet Take 1 tablet by mouth every 8 hours as needed for Nausea or Vomiting 1/30/20  Yes Katheryn Lucero DO   omeprazole (PRILOSEC) 20 MG capsule Take 20 mg by mouth daily    Yes Historical Provider, MD   timolol (TIMOPTIC-XR) 0.5 % ophthalmic gel-forming Place 1 drop into both eyes nightly    Yes Historical Provider, MD       Allergies:  Latex, Ferric carboxymaltose, Aldactone [spironolactone], Baclofen, Cardizem [diltiazem hcl], Dilaudid [hydromorphone hcl], Droperidol, Guanfacine hcl, Hydrochlorothiazide, Ibuprofen, Lasix [furosemide], Lorazepam, Nexium [esomeprazole magnesium trihydrate], Nortriptyline, Other, Phenergan [promethazine hcl], Phenergan [promethazine hcl], Phenothiazines, Pilocarpine, Prochlorperazine edisylate, Reglan [metoclopramide hcl], Rofecoxib, Tramadol, Triavil [perphenazine-amitriptyline], Ultracet [tramadol-acetaminophen], Valium, Vioxx, Zofran odt [ondansetron], Amlodipine, Amoxicillin, Augmentin [amoxicillin-pot clavulanate], Avelox [moxifloxacin], Bactrim [sulfamethoxazole-trimethoprim], Biaxin [clarithromycin], Cephalexin, Clonidine derivatives, Codeine, Coreg [carvedilol], Demerol, Hydralazine, Levaquin [levofloxacin in d5w], Maxalt [rizatriptan benzoate], Morphine and related, Scopolamine, and Zonisamide    Social History:      The patient currently lives     TOBACCO:   reports that she has never smoked. She has never used smokeless tobacco.  ETOH:   reports no history of alcohol use. Family History:       Reviewed in detail and negative for DM, CAD, Cancer, CVA. Positive as follows:        Problem Relation Age of Onset    Heart Disease Mother     Cancer Sister     Cancer Brother        REVIEW OF SYSTEMS:   Pertinent positives as noted in the HPI. All other systems reviewed and negative. PHYSICAL EXAM:    BP (!) 166/98   Pulse 82   Temp 98.7 °F (37.1 °C) (Rectal)   Resp 15   Ht 5' 3\" (1.6 m)   Wt 101 lb (45.8 kg)   SpO2 100%   BMI 17.89 kg/m²     General appearance:  No apparent distress, appears stated age and cooperative. HEENT:  Normal cephalic, atraumatic without obvious deformity. Pupils equal, round, and reactive to light. Extra ocular muscles intact. Conjunctivae/corneas clear. Neck: Supple, with full range of motion. No jugular venous distention. Trachea midline. Respiratory:  Normal respiratory effort. Clear to auscultation, bilaterally without Rales/Wheezes/Rhonchi.   Cardiovascular:  Regular rate and rhythm with normal S1/S2 without murmurs, rubs or gallops. Abdomen: Soft, non-tender, non-distended with normal bowel sounds. Musculoskeletal:  No clubbing, cyanosis or edema bilaterally. Full range of motion without deformity. Skin: Skin color, texture, turgor normal.  No rashes or lesions. Neurologic:  Neurovascularly intact without any focal sensory/motor deficits. Cranial nerves: II-XII intact, grossly non-focal.  Psychiatric:  Alert and oriented to self  Capillary Refill: Brisk,< 3 seconds   Peripheral Pulses: +2 palpable, equal bilaterally       CXR:  I have reviewed the CXR with the following interpretation: Left basilar infiltrate  EKG:  I have reviewed the EKG with the following interpretation:     Labs:     Recent Labs     09/27/21  0534   WBC 15.0*   HGB 13.7   HCT 40.5        Recent Labs     09/27/21  0534   *   K 3.3*      CO2 25   BUN 51*   CREATININE 1.5*   CALCIUM 9.4     Recent Labs     09/27/21  0534   AST 56*   ALT 21   BILITOT 1.2*   ALKPHOS 201*     No results for input(s): INR in the last 72 hours. Recent Labs     09/27/21  0534 09/27/21  0900   TROPONINI 0.01 0.01       Urinalysis:      Lab Results   Component Value Date    NITRU Negative 09/27/2021    WBCUA 3-5 09/27/2021    BACTERIA Rare 09/27/2021    RBCUA 3-4 09/27/2021    BLOODU Negative 09/27/2021    SPECGRAV 1.015 09/27/2021    GLUCOSEU Negative 09/27/2021    GLUCOSEU NEGATIVE 02/03/2012         ASSESSMENT:      -Hypernatremia with dehydration-sodium 151--received IV normal saline bolus in the ED, will start hypotonic fluids with D5 infusion closely monitor sodium  -Acute kidney injuryprerenal in the setting of dehydration and ARB use. . Creatinine is elevated 1.5, baseline is about 0.9. Hold losartan and continue with fluids. Avoid nephrotoxic medications  -HypokalemiamildK3. 3--replete potassium  -Sepsis likely due to pneumonia--patient with sinus tachycardia, leukocytosis. -Lactate is normal.-Received IV fluids 30 cc/kg in the ED.   Continue respiratory empiric antibiotics  -Suspected left basilar bacterial pneumonia/HCAP. Alexus Bloodgood Chest x-ray shows left basilar infiltrate. . Procalcitonin is elevated 0.5 with SIRS criteria. .. Continue IV meropenem and vancomycin. . Patient has multiple drug allergies across multiple classes. . Rapid Covid test is pending. . Follow-up on culture data  -Essential pretensionuncontrolled-continue metoprolol and minoxidil. Hold losartan due to MANDY  -Dementia-continue supportive care  -Paroxysmal A. Fib-sinus tach on presentation-not anticoagulated given history of GI bleed--continue flecainide and metoprolol  -Follicular lymphoma--follows with oncologyunknown treatment history              DVT Prophylaxis: Lovenox  Diet: ADULT DIET; Regular; Low Sodium (2 gm)  Code Status: Full Code           Stephen Monique MD    Thank you Kathy Bella MD for the opportunity to be involved in this patient's care. If you have any questions or concerns please feel free to contact me at [phone].

## 2021-09-28 LAB
ANION GAP SERPL CALCULATED.3IONS-SCNC: 10 MMOL/L (ref 3–16)
BUN BLDV-MCNC: 39 MG/DL (ref 7–20)
CALCIUM SERPL-MCNC: 9 MG/DL (ref 8.3–10.6)
CHLORIDE BLD-SCNC: 114 MMOL/L (ref 99–110)
CO2: 25 MMOL/L (ref 21–32)
CREAT SERPL-MCNC: 0.9 MG/DL (ref 0.6–1.2)
GFR AFRICAN AMERICAN: >60
GFR NON-AFRICAN AMERICAN: >60
GLUCOSE BLD-MCNC: 147 MG/DL (ref 70–99)
HCT VFR BLD CALC: 36.2 % (ref 36–48)
HEMOGLOBIN: 12.2 G/DL (ref 12–16)
MCH RBC QN AUTO: 30.9 PG (ref 26–34)
MCHC RBC AUTO-ENTMCNC: 33.7 G/DL (ref 31–36)
MCV RBC AUTO: 91.5 FL (ref 80–100)
MRSA SCREEN RT-PCR: NORMAL
PDW BLD-RTO: 14.4 % (ref 12.4–15.4)
PLATELET # BLD: 192 K/UL (ref 135–450)
PMV BLD AUTO: 9.3 FL (ref 5–10.5)
POTASSIUM REFLEX MAGNESIUM: 3.7 MMOL/L (ref 3.5–5.1)
RBC # BLD: 3.95 M/UL (ref 4–5.2)
SODIUM BLD-SCNC: 149 MMOL/L (ref 136–145)
URINE CULTURE, ROUTINE: NORMAL
WBC # BLD: 8.6 K/UL (ref 4–11)

## 2021-09-28 PROCEDURE — 85027 COMPLETE CBC AUTOMATED: CPT

## 2021-09-28 PROCEDURE — 6370000000 HC RX 637 (ALT 250 FOR IP): Performed by: HOSPITALIST

## 2021-09-28 PROCEDURE — 6360000002 HC RX W HCPCS: Performed by: HOSPITALIST

## 2021-09-28 PROCEDURE — 97530 THERAPEUTIC ACTIVITIES: CPT

## 2021-09-28 PROCEDURE — 80048 BASIC METABOLIC PNL TOTAL CA: CPT

## 2021-09-28 PROCEDURE — 2580000003 HC RX 258: Performed by: HOSPITALIST

## 2021-09-28 PROCEDURE — 2500000003 HC RX 250 WO HCPCS: Performed by: HOSPITALIST

## 2021-09-28 PROCEDURE — 1200000000 HC SEMI PRIVATE

## 2021-09-28 PROCEDURE — 2500000003 HC RX 250 WO HCPCS: Performed by: NURSE PRACTITIONER

## 2021-09-28 PROCEDURE — 97166 OT EVAL MOD COMPLEX 45 MIN: CPT

## 2021-09-28 PROCEDURE — 97162 PT EVAL MOD COMPLEX 30 MIN: CPT

## 2021-09-28 RX ORDER — DEXTROSE MONOHYDRATE 50 MG/ML
INJECTION, SOLUTION INTRAVENOUS CONTINUOUS
Status: DISCONTINUED | OUTPATIENT
Start: 2021-09-28 | End: 2021-09-30

## 2021-09-28 RX ORDER — MINOXIDIL 2.5 MG/1
5 TABLET ORAL 2 TIMES DAILY
Status: DISCONTINUED | OUTPATIENT
Start: 2021-09-28 | End: 2021-10-02

## 2021-09-28 RX ORDER — LABETALOL HYDROCHLORIDE 5 MG/ML
10 INJECTION, SOLUTION INTRAVENOUS EVERY 4 HOURS PRN
Status: DISCONTINUED | OUTPATIENT
Start: 2021-09-28 | End: 2021-09-29

## 2021-09-28 RX ORDER — LABETALOL HYDROCHLORIDE 5 MG/ML
10 INJECTION, SOLUTION INTRAVENOUS ONCE
Status: COMPLETED | OUTPATIENT
Start: 2021-09-28 | End: 2021-09-28

## 2021-09-28 RX ADMIN — BRIMONIDINE TARTRATE 1 DROP: 2 SOLUTION OPHTHALMIC at 09:44

## 2021-09-28 RX ADMIN — Medication 10 ML: at 09:43

## 2021-09-28 RX ADMIN — METOPROLOL TARTRATE 25 MG: 25 TABLET, FILM COATED ORAL at 21:10

## 2021-09-28 RX ADMIN — PANTOPRAZOLE SODIUM 40 MG: 40 TABLET, DELAYED RELEASE ORAL at 09:43

## 2021-09-28 RX ADMIN — FLECAINIDE ACETATE 50 MG: 100 TABLET ORAL at 10:50

## 2021-09-28 RX ADMIN — BRIMONIDINE TARTRATE 1 DROP: 2 SOLUTION OPHTHALMIC at 21:08

## 2021-09-28 RX ADMIN — VANCOMYCIN HYDROCHLORIDE 750 MG: 750 INJECTION, POWDER, LYOPHILIZED, FOR SOLUTION INTRAVENOUS at 14:12

## 2021-09-28 RX ADMIN — ENOXAPARIN SODIUM 30 MG: 30 INJECTION SUBCUTANEOUS at 09:43

## 2021-09-28 RX ADMIN — TIMOLOL MALEATE 1 DROP: 5 SOLUTION OPHTHALMIC at 09:43

## 2021-09-28 RX ADMIN — MEROPENEM 500 MG: 500 INJECTION, POWDER, FOR SOLUTION INTRAVENOUS at 00:42

## 2021-09-28 RX ADMIN — LABETALOL HYDROCHLORIDE 10 MG: 5 INJECTION, SOLUTION INTRAVENOUS at 05:08

## 2021-09-28 RX ADMIN — DEXTROSE MONOHYDRATE: 50 INJECTION, SOLUTION INTRAVENOUS at 09:46

## 2021-09-28 RX ADMIN — LABETALOL HYDROCHLORIDE 5 MG: 5 INJECTION INTRAVENOUS at 00:33

## 2021-09-28 RX ADMIN — MINOXIDIL 2.5 MG: 2.5 TABLET ORAL at 10:39

## 2021-09-28 RX ADMIN — MEROPENEM 500 MG: 500 INJECTION, POWDER, FOR SOLUTION INTRAVENOUS at 13:30

## 2021-09-28 RX ADMIN — TIMOLOL MALEATE 1 DROP: 5 SOLUTION OPHTHALMIC at 21:08

## 2021-09-28 RX ADMIN — METOPROLOL TARTRATE 25 MG: 25 TABLET, FILM COATED ORAL at 09:43

## 2021-09-28 RX ADMIN — LABETALOL HYDROCHLORIDE 10 MG: 5 INJECTION INTRAVENOUS at 21:23

## 2021-09-28 ASSESSMENT — PAIN SCALES - PAIN ASSESSMENT IN ADVANCED DEMENTIA (PAINAD)
CONSOLABILITY: 0
TOTALSCORE: 0
CONSOLABILITY: 0
NEGVOCALIZATION: 0
BODYLANGUAGE: 0
BODYLANGUAGE: 0
TOTALSCORE: 0
NEGVOCALIZATION: 0
BODYLANGUAGE: 0
FACIALEXPRESSION: 0
NEGVOCALIZATION: 0
FACIALEXPRESSION: 0
TOTALSCORE: 0
CONSOLABILITY: 0
BREATHING: 0
FACIALEXPRESSION: 0
FACIALEXPRESSION: 0
CONSOLABILITY: 0
BREATHING: 0
TOTALSCORE: 0
NEGVOCALIZATION: 0
TOTALSCORE: 0
BREATHING: 0
NEGVOCALIZATION: 0
CONSOLABILITY: 0
FACIALEXPRESSION: 0
BREATHING: 0
BREATHING: 0

## 2021-09-28 ASSESSMENT — PAIN SCALES - GENERAL: PAINLEVEL_OUTOF10: 0

## 2021-09-28 NOTE — PROGRESS NOTES
restrictions: Telemetry, up with assist, L chest port    Subjective   General  Chart Reviewed: Yes  Patient assessed for rehabilitation services?: Yes  Family / Caregiver Present: No  Referring Practitioner: Dr. Merlyn Narayanan  Diagnosis: hypernatremia  General Comment  Comments: RN cleared pt for OT eval; pt somnolent in semi-sahu's; increased level of alertness sitting EOB  Patient Currently in Pain: Yes (unable to rate or localize)  Pain Assessment  Pain Assessment: Advanced Dementia    Social/Functional History  Social/Functional History  Lives With: Family (spouse (blind) & granddaughter(works))  Type of Home: House  Home Layout: One level  Home Access: Ramped entrance  Bathroom Shower/Tub: Tub/Shower unit  Bathroom Toilet: Standard  Home Equipment: 4 wheeled walker, Quad cane, [address] Help From: Family  ADL Assistance: Needs assistance  Bath: Maximum assistance  Dressing: Moderate assistance  Toileting: Needs assistance  Ambulation Assistance: Needs assistance  Additional Comments: pt readmitted from [facility] for SNF, info from Social Work notes at [facility] 2021       Objective   Vision: Impaired  Hearing: Exceptions to [facility]  Hearing Exceptions: Hard of hearing/hearing concerns    Orientation  Overall Orientation Status: Impaired  Orientation Level: Disoriented X4 (unable to provide her name, partially recall birthdate;)     Balance  Sitting Balance: Maximum assistance  Standing Balance: Unable to assess(comment) (due to poor sitting balance, decreased cognition)  ADL  Feeding: Maximum assistance;Bringing food to mouth assist;Beverage management  Grooming: Maximum assistance (to wash face)  Coordination  Movements Are Fluid And Coordinated: No  Coordination and Movement description: Fine motor impairments;Gross motor impairments; Left UE;Right UE     Bed mobility  Supine to Sit: 2 Person assistance (max assist of 2)  Sit to Supine: 2 Person assistance (max assist of 2)  Scootin Person assistance        Cognition  Overall Cognitive Status: Exceptions (cooperative, not resistive to mobility/repositioning or sitting EOB)  Arousal/Alertness: Inconsistent responses to stimuli  Following Commands: Inconsistently follows commands  Attention Span: Difficulty attending to directions  Memory: Decreased long term memory;Decreased short term memory;Decreased recall of biographical Information  Safety Judgement: Decreased awareness of need for safety;Decreased awareness of need for assistance  Insights: Not aware of deficits  Initiation: Requires cues for all  Sequencing: Requires cues for all              Plan   Plan  Times per week: 3-5x/ week  Current Treatment Recommendations: Balance Training, Functional Mobility Training, Safety Education & Training, Positioning, Self-Care / ADL      [facility] Score        [facility] Inpatient Daily Activity Raw Score: 8 (09/28/21 1549)  -PAC Inpatient ADL T-Scale Score : 22.86 (09/28/21 1549)  ADL Inpatient CMS 0-100% Score: 85.69 (09/28/21 1549)  ADL Inpatient CMS G-Code Modifier : CM (09/28/21 1549)    Goals  Short term goals  Time Frame for Short term goals: 1 week trial(10-05-21)  Short term goal 1: mod assist of 2 with functional/toilet transfers with MOI BARRAGAN by 10-03-21  Short term goal 2: min assist with self feeding while up in chair by 10-05-21  Patient Goals   Patient goals : unable to verbalize       Therapy Time   Individual Concurrent Group Co-treatment   Time In 1450         Time Out 1525         Minutes [address] [place] [address]

## 2021-09-28 NOTE — PROGRESS NOTES
4 Eyes Skin Assessment     The patient is being assess for   Admission    I agree that 2 RN's have performed a thorough Head to Toe Skin Assessment on the patient. ALL assessment sites listed below have been assessed. Areas assessed for pressure by both nurses:   [x]   Head, Face, and Ears   [x]   Shoulders, Back, and Chest, Abdomen  [x]   Arms, Elbows, and Hands   [x]   Coccyx, Sacrum, and Ischium  [x]   Legs, Feet, and Heels               **SHARE this note so that the co-signing nurse is able to place an eSignature**    Co-signer eSignature: {Esignature:[id number]}    Does the Patient have Skin Breakdown related to pressure?   No            Joel Prevention initiated:  Yes   Wound Care Orders initiated:  NA      WOC nurse consulted for Pressure Injury (Stage 3,4, Unstageable, DTI, NWPT, Complex wounds)and New or Established Ostomies:  NA      Primary Nurse eSignature: Electronically signed by Hector Bajwa RN on 9/27/21 at 10:33 PM EDT

## 2021-09-28 NOTE — CARE COORDINATION
CASE MANAGEMENT INITIAL ASSESSMENT    Reviewed chart and completed assessment via telephone with: pt's  Candelaria Jones  Explained Case Management role/services. Health Care Decision Maker :   Primary Decision Maker: Mike Solor - [phone]    Secondary Decision Maker: Mishel Butterfield - Child - [phone]        Admit date/status: 9/27 Inpatient  Diagnosis: Hypernatremia   Is this a Readmission?:  Yes, pt was discharged from [facility] on 9/23 and went to [facility] for skilled rehab    Insurance: [facility] required for SNF: No, waived during pandemic.       3 night stay required: No    Living arrangements, Adls, care needs, prior to admission: Pt typically lives with  but has been at [facility] for last few days.  is blind and is able to assist with her ADLs except says he cannot determine if she swallows her meds or not. Transportation: Ambulance     PT/OT recs: Not ordered yet    Hospital Exemption Notification (HEN): Not needed for return to [facility]    Barriers to discharge: None noted from CM standpoint, pt's  is concerned about her intake/refusing meds    Plan/comments: Pt's  said pt is not ready yet to discuss discharge, but he would be agreeable to her returning to [facility] if SNF appropriate. Writer spoke with Shaunna/marlys at [facility], they will follow pt via [facility] and can likely accept back. CM will continue to follow pt's progress and coordinate discharge arrangements as appropriate. ECOC on chart for MD signature.   RANDY العلي-RN

## 2021-09-28 NOTE — PROGRESS NOTES
Hospitalist Progress Note      PCP: Peace Crowell MD    Date of Admission: 9/27/2021        Hospital Course:     64 y.o. female with dementia, PAF, hypertension was sent to the emergency room from her skilled nursing facility with generalized weakness, poor appetite and elevated white blood cell count. .. Patient is a poor historian given her dementia. History is obtained from ED staff and review of records . [facility] is no reported fever , shortness of breath, cough, abdominal pain, nausea, vomiting, diarrhea, headaches, dysuria, frequency or any other complaints except for fatigue. .  In the emergency room, BP was 171/115, pulse 114, respirations 16, temperature 98. 3. . WBC was 15. . Chest x-ray showed small left lower lobe infiltrate. . Procalcitonin was elevated at 0.5. [facility] Serum lactate was normal... UA showed no pyuria. . There was no clinical evidence of skin or soft tissue infection or serious infection. No other localizing infections identified. [facility] She was noted to have a sodium of 152 and acutely elevated creatinine 1.5  Patient was given IV meropenem and vancomycin empirically and IV fluid bolus. Subjective:     Patient refusing her medications last time. She is not eating much. Blood pressure has been elevated this morning. No fevers or chills.       Medications:  Reviewed    Infusion Medications    dextrose 100 mL/hr at 09/28/21 0946    sodium chloride       Scheduled Medications    minoxidil  5 mg Oral BID    flecainide  50 mg Oral BID    metoprolol tartrate  25 mg Oral BID    timolol  1 drop Both Eyes BID    brimonidine  1 drop Both Eyes BID    sodium chloride flush  5-40 mL IntraVENous 2 times per day    enoxaparin  30 mg SubCUTAneous Daily    pantoprazole  40 mg Oral QAM AC    meropenem  500 mg IntraVENous Q12H    vancomycin  750 mg IntraVENous Q24H     PRN Meds: sodium chloride flush, sodium chloride, polyethylene glycol, ondansetron      Intake/Output Summary (Last 24 hours) at 9/28/2021 1209  Last data filed at 9/27/2021 2012  Gross per 24 hour   Intake    Output 200 ml   Net -200 ml       Physical Exam Performed:    BP (!) 190/81   Pulse 86   Temp 93.5 °F (34.2 °C) (Oral)   Resp 17   Ht 5' 3\" (1.6 m)   Wt 101 lb (45.8 kg)   SpO2 94%   BMI 17.89 kg/m²     General appearance: No apparent distress, appears stated age and cooperative. HEENT: Pupils equal, round, and reactive to light. Conjunctivae/corneas clear. Neck: Supple, with full range of motion. No jugular venous distention. Trachea midline. Respiratory:  Normal respiratory effort. Clear to auscultation, bilaterally without Rales/Wheezes/Rhonchi. Cardiovascular: Regular rate and rhythm with normal S1/S2 without murmurs, rubs or gallops. Abdomen: Soft, non-tender, non-distended with normal bowel sounds. Musculoskeletal: No clubbing, cyanosis or edema bilaterally. Full range of motion without deformity. Skin: Skin color, texture, turgor normal.  No rashes or lesions. Neurologic:  Neurovascularly intact without any focal sensory/motor deficits. Cranial nerves: II-XII intact, grossly non-focal.  Psychiatric: Alert and oriented to self  Capillary Refill: Brisk,3 seconds, normal   Peripheral Pulses: +2 palpable, equal bilaterally       Labs:   Recent Labs     09/27/21  0534 09/28/21  0530   WBC 15.0* 8.6   HGB 13.7 12.2   HCT 40.5 36.2    192     Recent Labs     09/27/21  0534 09/27/21  1450 09/28/21  0530   * 144 149*   K 3.3* 3.0* 3.7    109 114*   CO2 25 24 25   BUN 51* 47* 39*   CREATININE 1.5* 1.2 0.9   CALCIUM 9.4 8.5 9.0     Recent Labs     09/27/21  0534   AST 56*   ALT 21   BILITOT 1.2*   ALKPHOS 201*     No results for input(s): INR in the last 72 hours.   Recent Labs     09/27/21  0534 09/27/21  0900   TROPONINI 0.01 0.01       Urinalysis:      Lab Results   Component Value Date    NITRU Negative 09/27/2021    WBCUA 3-5 09/27/2021    BACTERIA Rare 09/27/2021    RBCUA 3-4 09/27/2021    BLOODU Negative 09/27/2021    SPECGRAV 1.015 09/27/2021    GLUCOSEU Negative 09/27/2021    GLUCOSEU NEGATIVE 02/03/2012       Radiology:  XR ELBOW LEFT (2 VIEWS)   Final Result   No acute fracture, dislocation, or sign of joint effusion. XR WRIST LEFT (2 VIEWS)   Final Result   No acute fracture or dislocation at the left wrist.         XR CHEST PORTABLE   Final Result   Strandy opacity at the left base there are present subsegmental atelectasis   rather than infiltrate. The remaining lungs are stable. Assessment/Plan:      -Hypernatremia with dehydration-sodium 151 on presentation. Improving. Resume hypotonic fluids-D5 at 50 cc/h-- endorses poor oral intake over the past 2 weeks  -Acute kidney injuryprerenal in the setting of dehydration and ARB use. . Creatinine was elevated 1.5, baseline is about 0.9. Held losartan --resolved with IV fluids. Continue to monitor  -Hypokalemiarepleted  -Sepsis likely due to pneumonia--patient with sinus tachycardia, leukocytosis. -Lactate is normal.-Received IV fluids 30 cc/kg in the ED-resolved. Continue antibiotics and follow-up on culture data.  -Suspected left basilar bacterial pneumonia/HCAP. Ángela Rastatum Chest x-ray shows left basilar infiltrate. . Procalcitonin was elevated 0.5 with  leukocytosis and tachycardia. .. Continue IV meropenem and vancomycin. . Patient has multiple drug allergies across multiple classes. .  Covid test still pending. . Follow-up on culture data and de-escalate antibiotics based on culture data  -Essential pretensionuncontrolled-continue metoprolol and minoxidil-increase dose to 5 mg twice daily. Held losartan due to MANDY. Ángela Edwards   reports longstanding history of labile, uncontrolled hypertension managed by cardiology  -Advanced dementia-continue supportive care  -Paroxysmal A. Fib-sinus tach on presentation-not anticoagulated given history of GI bleed--continue flecainide and metoprolol  -Follicular lymphoma--follows with oncologynot yet required treatment           DVT Prophylaxis: Lovenox  Diet: ADULT DIET; Regular; Low Sodium (2 gm)  Code Status: Full Code    Disposition. ..  Back to SNF within the next 1 to 2 days    Trevin Valera MD

## 2021-09-28 NOTE — PROGRESS NOTES
Physical Therapy    Facility/Department: [facility] C5 - MED SURG/ORTHO  Initial Assessment/Treatment    NAME: Christopher Proctor  : 1945  MRN: [id number]    Date of Service: 2021    Discharge Recommendations:  Subacute/Skilled Nursing Facility   PT Equipment Recommendations  Equipment Needed: No  Other: defer to next level of care    Assessment   Body structures, Functions, Activity limitations: Decreased functional mobility ; Decreased balance;Decreased posture;Decreased strength;Decreased ROM; Decreased safe awareness;Decreased cognition;Decreased endurance  Assessment: Pt is a 64 y.o. female admitted to [facility] secondary to spesis and PNA. Pt is unable to provide hx, per chart she lives with her  in one level home with ramped entrance. Pt typically requires assistance for mobility, (unsure BRIAN or mobility at baseline). Pt is noted to have B PF contractures (L>R), anticipate pt not likely ambulatory PTA. Pt is currently functioning below her expected baseline requiring TD x 2 for bed mobility and partial STS from EOB to promote WB through BLE. Pt is limited by cognition, decreased command following and fatigue. Pt will benefit from continued skilled PT in acute care setting to address above deficits. Recommend SNF at d/c. Treatment Diagnosis: Impaired functional mobility and balance  Specific instructions for Next Treatment: Progress mobility as tolerated  Prognosis: Guarded  Decision Making: Medium Complexity  PT Education: Goals; General Safety;PT Role;Plan of Care; Functional Mobility Training  Patient Education: role of PT and benefit of OOB mobility, pt demonstrates no evidence of learning  Barriers to Learning: Cognition, poor command following  REQUIRES PT FOLLOW UP: Yes  Activity Tolerance  Activity Tolerance: Patient limited by fatigue;Patient limited by endurance; Patient limited by cognitive status  Activity Tolerance: /88, HR 81, SpO2 92% on RA       Patient Diagnosis(es): There were no encounter diagnoses. has a past medical history of Abnormal LFT's, Arthritis, Atrial fibrillation ([facility] [address] Follicular lymphoma ([address] GERD (gastroesophageal reflux disease), Glaucoma, Gout, Hypertension, Liver disease, Migraine, Non-ulcer dyspepsia, Peripheral vascular disease ([address] and Thyroid disease. has a past surgical history that includes Thyroidectomy; Hysterectomy; Appendectomy; Tonsillectomy; Cholecystectomy; Cataract removal; Upper gastrointestinal endoscopy; lymph node biopsy; Endoscopy, colon, diagnostic (11/14/2011); Colonoscopy (2/27/2015); Upper gastrointestinal endoscopy (2/27/2015); hip surgery (Right, 06/30/2020); and hip surgery (Right, 6/30/2020).     Restrictions  Restrictions/Precautions  Restrictions/Precautions: Fall Risk, Isolation  Position Activity Restriction  Other position/activity restrictions: Telemetry, up with assist, L chest port  Vision/Hearing  Vision: Impaired  Hearing: Exceptions to [facility]  Hearing Exceptions: Hard of hearing/hearing concerns     Subjective  General  Chart Reviewed: Yes  Patient assessed for rehabilitation services?: Yes  Additional Pertinent Hx: Hx: dementia, PAF, hypertension  Response To Previous Treatment: Not applicable  Family / Caregiver Present: No  Referring Practitioner: Sally Prater MD  Referral Date : 09/28/21  Diagnosis: Sepsis, PNA, hypernatremia  Follows Commands: Impaired  General Comment  Comments: RN cleared pt for session  Subjective  Subjective: Pt supine in bed on approach, minimal vocalizations but not resistant to PT evaluation  Pain Screening  Patient Currently in Pain: Yes  Pain Assessment  Pain Assessment: Advanced Dementia  Vital Signs  Patient Currently in Pain: Yes       Orientation  Orientation  Overall Orientation Status: Impaired  Orientation Level: Disoriented X4  Social/Functional History  Social/Functional History  Lives With: Family (spouse (blind) & granddaughter(works))  Type of Home: House  Home Layout: One level  Home Access: Ramped entrance  Bathroom Shower/Tub: Tub/Shower unit  Bathroom Toilet: Standard  Home Equipment: 4 wheeled walker, Quad cane, [place] [employer] Help From: Family  ADL Assistance: Needs assistance  Bath: Maximum assistance  Dressing: Moderate assistance  Toileting: Needs assistance  Ambulation Assistance: Needs assistance  Additional Comments: pt readmitted from [facility] for SNF, info from Social Work notes at [facility] 9/2021, pt unable to provide hx      Objective     Observation/Palpation  Posture: Poor  Observation: Pt hold BUE close to chest, guards LUE with mobility    PROM RLE (degrees)  RLE General PROM: Decreased hip and knee ROM ~50%, PF contracture lacking ~5 degress from neutral  PROM LLE (degrees)  LLE General PROM: Decreased hip and knee ROM ~50%, PF contracture lacking ~10 degress from neutral     Strength RLE  Comment: EMMA d/t cognition and poor command following, appears grossly <2/5 with functional mobility  Strength LLE  Comment: EMMA d/t cognition and poor command following, appears grossly <2/5 with functional mobility     Sensation  Overall Sensation Status:  (EMMA)     Bed mobility  Supine to Sit: Dependent/Total  Sit to Supine: Dependent/Total  Comment: Supine to/from sit TD x 2, cues for sequence and technique but pt demonstrates minimal activation/participation     Transfers  Sit to Stand: Dependent/Total  Stand to sit: Dependent/Total  Comment: Pt completes x 3 bout partial STS from EOB but demosntrates minimal WB to BLE and minimal activiation/participation in attepts at partial t/f     Ambulation  Ambulation?: No (unsafe to attempt)        Balance  Posture: Poor  Sitting - Static: Poor  Sitting - Dynamic: Poor  Comments: Pt sitting EOB x 15 minutes variable Kirill to moda/maxA from PT with OT assisting pt with ADL (washing face, attempting feeding) PT providing cues for anterior pelvic tilit and forward WS.         Plan   Plan  Times per week: 2-3x/wk  Times per day: Daily  Specific instructions for Next Treatment: Progress mobility as tolerated  Current Treatment Recommendations: Strengthening, Neuromuscular Re-education, Safety Education & Training, Balance Training, Endurance Training, Patient/Caregiver Education & Training, Functional Mobility Training, Manual Therapy - Soft Tissue Mobilization, Transfer Training, Positioning  Safety Devices  Type of devices: All fall risk precautions in place, Bed alarm in place, Call light within reach, Telesitter in use, Nurse notified, Gait belt, Patient at risk for falls, Left in bed    AM-PAC Score     AM-PAC Inpatient Mobility without Stair Climbing Raw Score : 7 (09/28/21 1631)  AM-PAC Inpatient without Stair Climbing T-Scale Score : 28.66 (09/28/21 1631)  Mobility Inpatient CMS 0-100% Score: 86.29 (09/28/21 1631)  Mobility Inpatient without Stair CMS G-Code Modifier : CM (09/28/21 1631)       Goals  Short term goals  Time Frame for Short term goals: 1 week 10/05/21 (unless otherwise specified)  Short term goal 1: Pt completes supine to/from sit with modA x 2  Short term goal 2: 10/03: Pt will sit EOB >15 minutes with SBA without LOB to improve activity tolerance  Short term goal 3: As appropriate/safe pt will participate in t/f bed <> chair with maxA x 2 and appropriate lift equipment  Patient Goals   Patient goals : pt is unable to state       Therapy Time   Individual Concurrent Group Co-treatment   Time In 1450         Time Out 1525         Minutes 35         Timed Code Treatment Minutes: 25 Minutes (10 minutes for eval)       If pt is unable to be seen after this session, please let this note serve as discharge summary. Please see case management note for discharge disposition. Thank you.     Lauren Lin, PT, DPT

## 2021-09-28 NOTE — PLAN OF CARE
Problem: Falls - Risk of:  Goal: Will remain free from falls  Description: Will remain free from falls  Outcome: Ongoing  Goal: Absence of physical injury  Description: Absence of physical injury  Outcome: Ongoing     Problem: Pain:  Goal: Pain level will decrease  Description: Pain level will decrease  Outcome: Ongoing  Goal: Control of acute pain  Description: Control of acute pain  Outcome: Ongoing  Goal: Control of chronic pain  Description: Control of chronic pain  Outcome: Ongoing     Problem: Skin Integrity:  Goal: Will show no infection signs and symptoms  Description: Will show no infection signs and symptoms  Outcome: Ongoing  Goal: Absence of new skin breakdown  Description: Absence of new skin breakdown  Outcome: Ongoing     Problem: Pain:  Goal: Pain level will decrease  Description: Pain level will decrease  Outcome: Ongoing     Problem: Skin Integrity:  Goal: Will show no infection signs and symptoms  Description: Will show no infection signs and symptoms  Outcome: Ongoing

## 2021-09-28 NOTE — ACP (ADVANCE CARE PLANNING)
ADVANCED CARE PLANNING    Name:Anahi Miramontes       :  1945              MRN:  [id number]      Purpose of Encounter: Advanced care planning in light of Advanced dementia  Parties in attendance: , Onel Barry MD, Family members:Govind Miramontes-  Decisional Capacity:No    Diagnosis: Active Problems:    Hypernatremia  Resolved Problems:    * No resolved hospital problems. *    Patients Medical Story: Patient with history of dementia, A. fib admitted nursing home with hypernatremia, MANDY, dehydration and sepsis  Goals of Care Determinations: Patient wishes to focus on getting better and back home  Plan: Will notify Jacobo Blancas MD of change in care plan. Will look at further interventions as needed. Code Status: At this time patient wishes to be Full Code  Time Spent with Patient: 20 minutes      Electronically signed by Onel Barry MD on 2021 at 12:25 PM  Thank you Jacobo Blancas MD for the opportunity to be involved in this patient's care.

## 2021-09-29 LAB
ANION GAP SERPL CALCULATED.3IONS-SCNC: 10 MMOL/L (ref 3–16)
BASOPHILS ABSOLUTE: 0 K/UL (ref 0–0.2)
BASOPHILS RELATIVE PERCENT: 0.5 %
BUN BLDV-MCNC: 22 MG/DL (ref 7–20)
CALCIUM SERPL-MCNC: 8.7 MG/DL (ref 8.3–10.6)
CHLORIDE BLD-SCNC: 111 MMOL/L (ref 99–110)
CO2: 27 MMOL/L (ref 21–32)
CREAT SERPL-MCNC: 0.9 MG/DL (ref 0.6–1.2)
EOSINOPHILS ABSOLUTE: 0.1 K/UL (ref 0–0.6)
EOSINOPHILS RELATIVE PERCENT: 1.7 %
GFR AFRICAN AMERICAN: >60
GFR NON-AFRICAN AMERICAN: >60
GLUCOSE BLD-MCNC: 137 MG/DL (ref 70–99)
HCT VFR BLD CALC: 34.9 % (ref 36–48)
HEMOGLOBIN: 11.8 G/DL (ref 12–16)
LYMPHOCYTES ABSOLUTE: 0.7 K/UL (ref 1–5.1)
LYMPHOCYTES RELATIVE PERCENT: 12.4 %
MCH RBC QN AUTO: 30.2 PG (ref 26–34)
MCHC RBC AUTO-ENTMCNC: 33.7 G/DL (ref 31–36)
MCV RBC AUTO: 89.5 FL (ref 80–100)
MONOCYTES ABSOLUTE: 0.8 K/UL (ref 0–1.3)
MONOCYTES RELATIVE PERCENT: 14.4 %
NEUTROPHILS ABSOLUTE: 3.8 K/UL (ref 1.7–7.7)
NEUTROPHILS RELATIVE PERCENT: 71 %
PDW BLD-RTO: 14.2 % (ref 12.4–15.4)
PLATELET # BLD: 175 K/UL (ref 135–450)
PMV BLD AUTO: 9.9 FL (ref 5–10.5)
POTASSIUM SERPL-SCNC: 3.4 MMOL/L (ref 3.5–5.1)
PROCALCITONIN: 0.2 NG/ML (ref 0–0.15)
RBC # BLD: 3.9 M/UL (ref 4–5.2)
SARS-COV-2: DETECTED
SARS-COV-2: NORMAL
SODIUM BLD-SCNC: 148 MMOL/L (ref 136–145)
VANCOMYCIN TROUGH: 12.4 UG/ML (ref 10–20)
WBC # BLD: 5.4 K/UL (ref 4–11)

## 2021-09-29 PROCEDURE — U0003 INFECTIOUS AGENT DETECTION BY NUCLEIC ACID (DNA OR RNA); SEVERE ACUTE RESPIRATORY SYNDROME CORONAVIRUS 2 (SARS-COV-2) (CORONAVIRUS DISEASE [COVID-19]), AMPLIFIED PROBE TECHNIQUE, MAKING USE OF HIGH THROUGHPUT TECHNOLOGIES AS DESCRIBED BY CMS-2020-01-R: HCPCS

## 2021-09-29 PROCEDURE — 84145 PROCALCITONIN (PCT): CPT

## 2021-09-29 PROCEDURE — 85025 COMPLETE CBC W/AUTO DIFF WBC: CPT

## 2021-09-29 PROCEDURE — 2580000003 HC RX 258: Performed by: HOSPITALIST

## 2021-09-29 PROCEDURE — 1200000000 HC SEMI PRIVATE

## 2021-09-29 PROCEDURE — 80202 ASSAY OF VANCOMYCIN: CPT

## 2021-09-29 PROCEDURE — U0005 INFEC AGEN DETEC AMPLI PROBE: HCPCS

## 2021-09-29 PROCEDURE — 6360000002 HC RX W HCPCS: Performed by: HOSPITALIST

## 2021-09-29 PROCEDURE — 6370000000 HC RX 637 (ALT 250 FOR IP): Performed by: HOSPITALIST

## 2021-09-29 PROCEDURE — 99223 1ST HOSP IP/OBS HIGH 75: CPT | Performed by: HOSPITALIST

## 2021-09-29 PROCEDURE — 80048 BASIC METABOLIC PNL TOTAL CA: CPT

## 2021-09-29 RX ORDER — AMLODIPINE BESYLATE 5 MG/1
10 TABLET ORAL DAILY
Status: DISCONTINUED | OUTPATIENT
Start: 2021-09-29 | End: 2021-10-07 | Stop reason: HOSPADM

## 2021-09-29 RX ORDER — METOPROLOL TARTRATE 50 MG/1
100 TABLET, FILM COATED ORAL 2 TIMES DAILY
Status: DISCONTINUED | OUTPATIENT
Start: 2021-09-29 | End: 2021-10-02

## 2021-09-29 RX ORDER — LABETALOL HYDROCHLORIDE 5 MG/ML
10 INJECTION, SOLUTION INTRAVENOUS EVERY 4 HOURS PRN
Status: DISCONTINUED | OUTPATIENT
Start: 2021-09-29 | End: 2021-10-05

## 2021-09-29 RX ORDER — METOPROLOL TARTRATE 50 MG/1
50 TABLET, FILM COATED ORAL 2 TIMES DAILY
Status: DISCONTINUED | OUTPATIENT
Start: 2021-09-29 | End: 2021-09-29

## 2021-09-29 RX ORDER — POTASSIUM CHLORIDE 20MEQ/15ML
40 LIQUID (ML) ORAL ONCE
Status: COMPLETED | OUTPATIENT
Start: 2021-09-29 | End: 2021-09-29

## 2021-09-29 RX ADMIN — BRIMONIDINE TARTRATE 1 DROP: 2 SOLUTION OPHTHALMIC at 21:03

## 2021-09-29 RX ADMIN — FLECAINIDE ACETATE 50 MG: 100 TABLET ORAL at 20:56

## 2021-09-29 RX ADMIN — LABETALOL HYDROCHLORIDE 10 MG: 5 INJECTION, SOLUTION INTRAVENOUS at 12:24

## 2021-09-29 RX ADMIN — AMLODIPINE BESYLATE 10 MG: 5 TABLET ORAL at 15:15

## 2021-09-29 RX ADMIN — FLECAINIDE ACETATE 50 MG: 100 TABLET ORAL at 10:13

## 2021-09-29 RX ADMIN — TIMOLOL MALEATE 1 DROP: 5 SOLUTION OPHTHALMIC at 21:03

## 2021-09-29 RX ADMIN — METOPROLOL TARTRATE 100 MG: 50 TABLET, FILM COATED ORAL at 20:57

## 2021-09-29 RX ADMIN — PANTOPRAZOLE SODIUM 40 MG: 40 TABLET, DELAYED RELEASE ORAL at 07:02

## 2021-09-29 RX ADMIN — MEROPENEM 500 MG: 500 INJECTION, POWDER, FOR SOLUTION INTRAVENOUS at 12:22

## 2021-09-29 RX ADMIN — METOPROLOL TARTRATE 50 MG: 50 TABLET, FILM COATED ORAL at 10:13

## 2021-09-29 RX ADMIN — ENOXAPARIN SODIUM 30 MG: 30 INJECTION SUBCUTANEOUS at 10:20

## 2021-09-29 RX ADMIN — BRIMONIDINE TARTRATE 1 DROP: 2 SOLUTION OPHTHALMIC at 10:19

## 2021-09-29 RX ADMIN — MINOXIDIL 5 MG: 2.5 TABLET ORAL at 10:13

## 2021-09-29 RX ADMIN — TIMOLOL MALEATE 1 DROP: 5 SOLUTION OPHTHALMIC at 10:23

## 2021-09-29 RX ADMIN — DEXTROSE MONOHYDRATE: 50 INJECTION, SOLUTION INTRAVENOUS at 21:07

## 2021-09-29 RX ADMIN — MEROPENEM 500 MG: 500 INJECTION, POWDER, FOR SOLUTION INTRAVENOUS at 00:28

## 2021-09-29 RX ADMIN — Medication 40 MEQ: at 10:15

## 2021-09-29 RX ADMIN — MINOXIDIL 5 MG: 2.5 TABLET ORAL at 20:56

## 2021-09-29 RX ADMIN — DEXTROSE MONOHYDRATE: 50 INJECTION, SOLUTION INTRAVENOUS at 10:05

## 2021-09-29 ASSESSMENT — PAIN SCALES - PAIN ASSESSMENT IN ADVANCED DEMENTIA (PAINAD)
BREATHING: 0
BREATHING: 0
FACIALEXPRESSION: 0
BODYLANGUAGE: 0
TOTALSCORE: 0
CONSOLABILITY: 0
BREATHING: 0
NEGVOCALIZATION: 0
TOTALSCORE: 0
NEGVOCALIZATION: 0
BODYLANGUAGE: 0
TOTALSCORE: 0
TOTALSCORE: 0
NEGVOCALIZATION: 0
FACIALEXPRESSION: 0
CONSOLABILITY: 0
FACIALEXPRESSION: 0
FACIALEXPRESSION: 0
TOTALSCORE: 0
BODYLANGUAGE: 0
FACIALEXPRESSION: 0
BODYLANGUAGE: 0
BREATHING: 0
CONSOLABILITY: 0
NEGVOCALIZATION: 0
BODYLANGUAGE: 0
BREATHING: 0
NEGVOCALIZATION: 0

## 2021-09-29 ASSESSMENT — PAIN SCALES - GENERAL: PAINLEVEL_OUTOF10: 0

## 2021-09-29 NOTE — PROGRESS NOTES
Hospitalist Progress Note      PCP: Emil Upton MD    Date of Admission: 9/27/2021        Hospital Course:     64 y.o. female with dementia, PAF, hypertension was sent to the emergency room from her skilled nursing facility with generalized weakness, poor appetite and elevated white blood cell count. .. Patient is a poor historian given her dementia. History is obtained from ED staff and review of records . [place] There is no reported fever , shortness of breath, cough, abdominal pain, nausea, vomiting, diarrhea, headaches, dysuria, frequency or any other complaints except for fatigue. .  In the emergency room, BP was 171/115, pulse 114, respirations 16, temperature 98. 3. . WBC was 15. . Chest x-ray showed small left lower lobe infiltrate. . Procalcitonin was elevated at 0.5. [place] Crape Serum lactate was normal... UA showed no pyuria. . There was no clinical evidence of skin or soft tissue infection or serious infection. No other localizing infections identified. Harriet Crape She was noted to have a sodium of 152 and acutely elevated creatinine 1.5  Patient was given IV meropenem and vancomycin empirically and IV fluid bolus. Subjective:     Patient oral intake remains poor. .. Says she feels well. . Blood pressures remain elevated    Medications:  Reviewed    Infusion Medications    dextrose 50 mL/hr at 09/28/21 1258    sodium chloride       Scheduled Medications    metoprolol tartrate  50 mg Oral BID    potassium bicarb-citric acid  40 mEq Oral Once    minoxidil  5 mg Oral BID    flecainide  50 mg Oral BID    timolol  1 drop Both Eyes BID    brimonidine  1 drop Both Eyes BID    sodium chloride flush  5-40 mL IntraVENous 2 times per day    enoxaparin  30 mg SubCUTAneous Daily    pantoprazole  40 mg Oral QAM AC    meropenem  500 mg IntraVENous Q12H     PRN Meds: labetalol, sodium chloride flush, sodium chloride, polyethylene glycol, ondansetron      Intake/Output Summary (Last 24 hours) at 9/29/2021 5409  Last data filed at 9/28/2021 1203  Gross per 24 hour   Intake 120 ml   Output    Net 120 ml       Physical Exam Performed:    BP (!) 178/80   Pulse 95   Temp 98.9 °F (37.2 °C) (Axillary)   Resp 18   Ht 5' 3\" (1.6 m)   Wt 101 lb (45.8 kg)   SpO2 96%   BMI 17.89 kg/m²     General appearance: No apparent distress, appears stated age and cooperative. HEENT: Pupils equal, round, and reactive to light. Conjunctivae/corneas clear. Neck: Supple, with full range of motion. No jugular venous distention. Trachea midline. Respiratory:  Normal respiratory effort. Clear to auscultation, bilaterally without Rales/Wheezes/Rhonchi. Cardiovascular: Regular rate and rhythm with normal S1/S2 without murmurs, rubs or gallops. Abdomen: Soft, non-tender, non-distended with normal bowel sounds. Musculoskeletal: No clubbing, cyanosis or edema bilaterally. Full range of motion without deformity. Skin: Skin color, texture, turgor normal.  No rashes or lesions. Neurologic:  Neurovascularly intact without any focal sensory/motor deficits. Cranial nerves: II-XII intact, grossly non-focal.  Psychiatric: Alert and oriented to self  Capillary Refill: Brisk,3 seconds, normal   Peripheral Pulses: +2 palpable, equal bilaterally       Labs:   Recent Labs     09/27/21  0534 09/28/21  0530 09/29/21  0610   WBC 15.0* 8.6 5.4   HGB 13.7 12.2 11.8*   HCT 40.5 36.2 34.9*    192 175     Recent Labs     09/27/21  1450 09/28/21  0530 09/29/21  0610    149* 148*   K 3.0* 3.7 3.4*    114* 111*   CO2 24 25 27   BUN 47* 39* 22*   CREATININE 1.2 0.9 0.9   CALCIUM 8.5 9.0 8.7     Recent Labs     09/27/21  0534   AST 56*   ALT 21   BILITOT 1.2*   ALKPHOS 201*     No results for input(s): INR in the last 72 hours.   Recent Labs     09/27/21  0534 09/27/21  0900   TROPONINI 0.01 0.01       Urinalysis:      Lab Results   Component Value Date    NITRU Negative 09/27/2021    WBCUA 3-5 09/27/2021    BACTERIA Rare 09/27/2021    RBCUA 3-4 09/27/2021 BLOODU Negative 09/27/2021    SPECGRAV 1.015 09/27/2021    GLUCOSEU Negative 09/27/2021    GLUCOSEU NEGATIVE 02/03/2012       Radiology:  XR ELBOW LEFT (2 VIEWS)   Final Result   No acute fracture, dislocation, or sign of joint effusion. XR WRIST LEFT (2 VIEWS)   Final Result   No acute fracture or dislocation at the left wrist.         XR CHEST PORTABLE   Final Result   Strandy opacity at the left base there are present subsegmental atelectasis   rather than infiltrate. The remaining lungs are stable. Assessment/Plan:      -Hypernatremia with dehydration-sodium 151 on presentation. Improving. C/w D5 at 50 cc/h,encouraged free water PO intake-- endorses poor oral intake over the past 2 weeks prior to admission  -Acute kidney injuryprerenal in the setting of dehydration and ARB use. . Creatinine was elevated 1.5, baseline is about 0.9. n --resolved with IV fluids. Continue to monitor  -Hypokalemiarepleted  -Sepsis likely due to pneumonia--patient with sinus tachycardia, leukocytosis. -Lactate is normal.-Received IV fluids 30 cc/kg in the ED-resolved. Continue antibiotics and follow-up on culture data.  -Suspected left basilar bacterial pneumonia/HCAP. [facility] Cox Chest x-ray shows left basilar infiltrate. . Procalcitonin was elevated 0.5 with  leukocytosis and tachycardia. .. Continue IV meropenem . discontinue vancomycin. . Patient has multiple drug allergies across multiple classes. .  Covid PCR was indeterminate, repeat testing requested. .   -Essential pretensionuncontrolled-continue metoprolol and minoxidil-increase dose of metoprolol, resume losartan.   .  reports longstanding history of labile, uncontrolled hypertension managed by cardiology-and apppears had w/u for secondary HTN in the past. Medications limited by multiple drug allergies  -Advanced dementia-continue supportive care  -Paroxysmal A. Fib-sinus tach on presentation-not anticoagulated given history of GI bleed--continue

## 2021-09-29 NOTE — CARE COORDINATION
Chart reviewed. Met with MD this am. Pt with advanced dementia, from [facility], usually lives home with . Admitted due to not eating, ? FTT. PC consult ordered and pending. Per MD,  wants Pt to remain full code and poss. Peg tube discussion. Will follow.

## 2021-09-29 NOTE — PROGRESS NOTES
Message via [employer] to Dr. Kalpana Hough    Please call patients son/POA Tone Lemus @ [phone]) regarding plan of care/treatment at your convenience. Thanks.

## 2021-09-29 NOTE — CONSULTS
Palliative Care Initial Note  Palliative Care Admit date:  9/28/21  Reason for c/s:  GOC, Code status    Advance Directives:  Reviewed pts HCPOA and Living Will in this EMR and she designated her son, Antonella Smith, as her healthcare proxy. That said, the pt did specify in the narrative portion of document that she wants her \"sons to include my  and each other in my medical decisions. \"    Pt currently has a full code status. Plan of care/goals:  Late Entry: awaited word from SNF to get a better idea of how pt was participating w/ them prior to returning to the hospital.  Unfortunately, they report pt was still \"so sick\" in the few days they had her that therapy hadn't started.             Reason for consult:    ___ Advance Care Planning  ___ Transition of Care Planning  ___ Psychosocial/Spiritual Support  ___ Symptom Management                                                                                                                                          Silvia Diaz RN

## 2021-09-29 NOTE — CONSULTS
Nephrology Consult Note                                                                                                                                                                                                                                                                                                                                                               Office : [phone]     Fax :[phone]              Patient's Name: Rosaura Valle  3:19 PM  9/29/2021    Reason for Consult:  HTN uncontrolled, Hypernatremia  Requesting Physician:  Ladonna Reid MD      Chief Complaint:  Weakness     History of Present Ilness:    Rosaura Valle is a 64 y.o. female with PMH of dementia, HTN     Presented with c/o weakness  Generalized weakness  Poor appetite    In the emergency room, BP was 171/115, pulse 114, respirations 16, temperature 98. 3. . WBC was 15. Bretta Mais x-ray showed small left lower lobe infiltrate. . Procalcitonin was elevated at 0.5.. Serum lactate was normal... UA showed no pyuria. Lacinda Pitch was no clinical evidence of skin or soft tissue infection or serious infection.  No other localizing infections identified. Kathleen Givens was noted to have a sodium of 152 and acutely elevated creatinine 1.5    Nephrology consult for management of hypernatremia and uncontrolled HTN     reports longstanding history of labile, uncontrolled hypertension managed by cardiology-and apppears had w/u for secondary HTN in the past. Medications limited by multiple drug allergies  Past Medical History:   Diagnosis Date    Abnormal LFT's     Arthritis     Atrial fibrillation ([address]     Follicular lymphoma ([address]     Dr Shay Sorenson GERD (gastroesophageal reflux disease)     Glaucoma     Gout 11/2/2010    Hypertension     Liver disease     steatohepatitis    Migraine     Non-ulcer dyspepsia     egd neg 11/2011    Peripheral vascular disease ([address]     Thyroid disease        Past Surgical History:   Procedure Laterality Date    APPENDECTOMY      CATARACT REMOVAL      2007    CHOLECYSTECTOMY      2005    COLONOSCOPY  2/27/2015    polyp    ENDOSCOPY, COLON, DIAGNOSTIC  11/14/2011    bx stomach polyp    HIP SURGERY Right 06/30/2020    RIGHT PERCUTANEOUS HIP PINNING (Right Hip) w./ Dr Lilliam Pantoja 6/30/20    HIP SURGERY Right 6/30/2020    RIGHT PERCUTANEOUS HIP PINNING performed by Chikis Landa DO at [facility]      THYROIDECTOMY      76    TONSILLECTOMY      UPPER GASTROINTESTINAL ENDOSCOPY      UPPER GASTROINTESTINAL ENDOSCOPY  2/27/2015    gastric polyp       Family History   Problem Relation Age of Onset    Heart Disease Mother     Cancer Sister     Cancer Brother         reports that she has never smoked. She has never used smokeless tobacco. She reports that she does not drink alcohol and does not use drugs.     Allergies:  Latex, Ferric carboxymaltose, Aldactone [spironolactone], Baclofen, Cardizem [diltiazem hcl], Dilaudid [hydromorphone hcl], Droperidol, Guanfacine hcl, Hydrochlorothiazide, Ibuprofen, Lasix [furosemide], Lorazepam, Nexium [esomeprazole magnesium trihydrate], Nortriptyline, Other, Phenergan [promethazine hcl], Phenergan [promethazine hcl], Phenothiazines, Pilocarpine, Prochlorperazine edisylate, Reglan [metoclopramide hcl], Rofecoxib, Tramadol, Triavil [perphenazine-amitriptyline], Ultracet [tramadol-acetaminophen], Valium, Vioxx, Zofran odt [ondansetron], Amlodipine, Amoxicillin, Augmentin [amoxicillin-pot clavulanate], Avelox [moxifloxacin], Bactrim [sulfamethoxazole-trimethoprim], Biaxin [clarithromycin], Cephalexin, Clonidine derivatives, Codeine, Coreg [carvedilol], Demerol, Hydralazine, Levaquin [levofloxacin in d5w], Maxalt [rizatriptan benzoate], Morphine and related, Scopolamine, and Zonisamide    Current Medications:    labetalol (NORMODYNE;TRANDATE) injection 10 mg, Q4H PRN  metoprolol tartrate (LOPRESSOR) tablet 100 mg, BID  amLODIPine (NORVASC) tablet 10 mg, Daily  dextrose 5 % solution, Continuous  minoxidil (LONITEN) tablet 5 mg, BID  flecainide (TAMBOCOR) tablet 50 mg, BID  timolol (TIMOPTIC) 0.5 % ophthalmic solution 1 drop, BID  brimonidine (ALPHAGAN) 0.2 % ophthalmic solution 1 drop, BID  sodium chloride flush 0.9 % injection 5-40 mL, 2 times per day  sodium chloride flush 0.9 % injection 5-40 mL, PRN  0.9 % sodium chloride infusion, PRN  enoxaparin (LOVENOX) injection 30 mg, Daily  polyethylene glycol (GLYCOLAX) packet 17 g, Daily PRN  pantoprazole (PROTONIX) tablet 40 mg, QAM AC  ondansetron (ZOFRAN-ODT) disintegrating tablet 4 mg, Q8H PRN  meropenem (MERREM) 500 mg IVPB (mini-bag), Q12H        Review of Systems:   14 point ROS obtained but were negative except mentioned in HPI      Physical exam:     Vitals:  BP (!) 145/86   Pulse 90   Temp 98.8 °F (37.1 °C) (Axillary)   Resp 18   Ht 5' 3\" (1.6 m)   Wt 101 lb (45.8 kg)   SpO2 96%   BMI 17.89 kg/m²   Constitutional:  OAA X2 NAD  Skin: no rash, turgor wnl  Heent:  eomi, mmm  Neck: no bruits or jvd noted  Cardiovascular:  S1, S2 without m/r/g  Respiratory: CTA B without w/r/r  Abdomen:  +bs, soft, nt, nd  Ext: no  lower extremity edema  Psychiatric: deferred   Musculoskeletal:  Rom, muscular strength intact    Data:   Labs:  CBC:   Recent Labs     09/27/21  0534 09/28/21  0530 09/29/21  0610   WBC 15.0* 8.6 5.4   HGB 13.7 12.2 11.8*    192 175     BMP:    Recent Labs     09/27/21  1450 09/28/21  0530 09/29/21  0610    149* 148*   K 3.0* 3.7 3.4*    114* 111*   CO2 24 25 27   BUN 47* 39* 22*   CREATININE 1.2 0.9 0.9   GLUCOSE 362* 147* 137*     Ca/Mg/Phos:   Recent Labs     09/27/21  1450 09/28/21  0530 09/29/21  0610   CALCIUM 8.5 9.0 8.7     Hepatic:   Recent Labs     09/27/21  0534   AST 56*   ALT 21   BILITOT 1.2*   ALKPHOS 201*     Troponin:   Recent Labs     09/27/21  0534 09/27/21  0900   TROPONINI 0.01 0.01     BNP: No results for input(s): BNP in the last 72 hours. Lipids: No results for input(s): CHOL, TRIG, HDL, LDLCALC, LABVLDL in the last 72 hours. ABGs: No results for input(s): PHART, PO2ART, OVQ7IMR in the last 72 hours. INR: No results for input(s): INR in the last 72 hours. UA:  Recent Labs     09/27/21  0534   COLORU Yellow   CLARITYU Clear   GLUCOSEU Negative   BILIRUBINUR Negative   KETUA Negative   SPECGRAV 1.015   BLOODU Negative   PHUR 5.5   PROTEINU Negative   UROBILINOGEN 1.0   NITRU Negative   LEUKOCYTESUR TRACE*   LABMICR YES   URINETYPE NotGiven      Urine Microscopic:   Recent Labs     09/27/21  0534   BACTERIA Rare*   WBCUA 3-5   RBCUA 3-4   EPIU 6-10*     Urine Culture:   Recent Labs     09/27/21  0534   LABURIN No growth at 18 to 36 hours     Urine Chemistry: No results for input(s): CLUR, LABCREA, PROTEINUR, NAUR in the last 72 hours. IMAGING:  XR ELBOW LEFT (2 VIEWS)   Final Result   No acute fracture, dislocation, or sign of joint effusion. XR WRIST LEFT (2 VIEWS)   Final Result   No acute fracture or dislocation at the left wrist.         XR CHEST PORTABLE   Final Result   Strandy opacity at the left base there are present subsegmental atelectasis   rather than infiltrate. The remaining lungs are stable. Assessment/Plan     1. Hypernatremia    Etiology : intravascular free water depletion     Serum Na  151 ( 9/27 ) -----> 149   -----> 148     Getting D5 water @ 50 ml/hr    Plan    Give D5 water @ 100 ml/hr    Moniter serum Na    2. Uncontrolled HTN    Increase Metoprolol to 100 mg BID    Continue Minoxidil 5 mg BID    Will initiate Amlodipine 10 mg/day  ( she has allergy registered as Vomiting , rash   ,, no anaphylaxis type allergy documented)    Will closely moniter     3.  MANDY     Improved    Pre renal MANDY       4 PNA    AB and management per primary team      5 Afib              Thank you for allowing us to participate in care of Pooja MD Mike  Feel free to contact me [facility] [address]  Office : [phone]  Fax :[phone]\

## 2021-09-29 NOTE — PROGRESS NOTES
Perfect Serve to Dr. Ayala Certain    9/29/21 6:14 PM   The son just called and said his dad (patients ) tested positive for COVID today. She was last with him Sunday.

## 2021-09-29 NOTE — PROGRESS NOTES
[address] [facility] lab due to Covid result coming back as indeterminate on a PCR - per lab they ran it twice but it must have had too much sputum on it to be ran.   Message sent to Dr Sebastien Gavin - ordered to please reswab patient

## 2021-09-29 NOTE — CONSULTS
Comprehensive Nutrition Assessment    Type and Reason for Visit:  Initial, Consult    Nutrition Recommendations/Plan:   1. Recommend General diet order, free of therapeutic restrictions, to promote adequate nutrient intake  2. RD to add ensure and magic cup BID  3. Due to inadequate intake, discussion of palliative care vs nutrition support should be considered. Consult dietitian if tube feeding is desired and consistent with patient's plan of care. 4. Monitor nutrition adequacy, pertinent labs, bowel habits, wt changes, and clinical progress    Nutrition Assessment:  Consult for poor PO intake: Pt admitted with COVID-19, fatigue, poor appetite and hypernatremia. In droplet precautions. Attempted to call today with no answer. Pt with dementia. Currently on low sodium diet, recommend liberalizing diet. 1 PO intake recorded of 1-25%. Pt is nutritionally at risk AEB BMI, wt loss and poor PO intake PTA. Pt has had some weight loss per EMR, pt was 110 lb on 10/2/2020 and currently 101 lb (-9% weight change x 1 year). RD to add ensure and magic cup for extra calories and protein. Peg tube discussion with family, per case management note. Pallative care following. Will continue to monitor. Malnutrition Assessment:  Malnutrition Status: At risk for malnutrition (Comment)    Context:  Acute Illness     Findings of the 6 clinical characteristics of malnutrition:  Energy Intake:  Mild decrease in energy intake (Comment)  Weight Loss:  No significant weight loss (-9% weight change x 1 year)     Fluid Accumulation:  1 - Mild Extremities    Estimated Daily Nutrient Needs:  Energy (kcal):  9068-1831 kcals/day; Weight Used for Energy Requirements:  Ideal (52 kg)     Protein (g):  52-62 g/day; Weight Used for Protein Requirements:  Ideal (1-1.2 g/kg)        Method Used for Fluid Requirements:  1 ml/kcal      Nutrition Related Findings:  Na high 148, K+ low 3.4. No BM recorded. IVF at 50 ml/hr.       Wounds:  None Current Nutrition Therapies:    Adult Oral Nutrition Supplement; Standard High Calorie/High Protein Oral Supplement  Adult Oral Nutrition Supplement; Frozen Oral Supplement  ADULT DIET;  Regular    Anthropometric Measures:  · Height: 5' 3\" (160 cm)  · Current Body Weight: 101 lb (45.8 kg)   · Ideal Body Weight: 115 lbs; % Ideal Body Weight 87.8 %   · BMI: 17.9  · BMI Categories: Underweight (BMI less than 22) age over 72       Nutrition Diagnosis:   · Inadequate oral intake related to early satiety as evidenced by poor intake prior to admission, weight loss, BMI, intake 0-25%    Nutrition Interventions:   Food and/or Nutrient Delivery:  Modify Current Diet, Start Oral Nutrition Supplement  Nutrition Education/Counseling:  Education not indicated   Coordination of Nutrition Care:  Continue to monitor while inpatient    Goals:  Consume 50% or greater of 3 meals per day and ONS       Nutrition Monitoring and Evaluation:   Behavioral-Environmental Outcomes:  None Identified   Food/Nutrient Intake Outcomes:  Food and Nutrient Intake, Supplement Intake  Physical Signs/Symptoms Outcomes:  Biochemical Data, Constipation     Discharge Planning:    Continue Oral Nutrition Supplement     Electronically signed by Johnnie Dumont MS, RD, LD on 9/29/21 at 11:26 AM EDT    Contact: Office: [phone]; [address]: [phone]

## 2021-09-30 LAB
BASOPHILS ABSOLUTE: 0 K/UL (ref 0–0.2)
BASOPHILS RELATIVE PERCENT: 0.6 %
EOSINOPHILS ABSOLUTE: 0 K/UL (ref 0–0.6)
EOSINOPHILS RELATIVE PERCENT: 1 %
HCT VFR BLD CALC: 34.9 % (ref 36–48)
HEMOGLOBIN: 11.9 G/DL (ref 12–16)
LYMPHOCYTES ABSOLUTE: 1.1 K/UL (ref 1–5.1)
LYMPHOCYTES RELATIVE PERCENT: 23.8 %
MCH RBC QN AUTO: 30.7 PG (ref 26–34)
MCHC RBC AUTO-ENTMCNC: 34.2 G/DL (ref 31–36)
MCV RBC AUTO: 89.9 FL (ref 80–100)
MONOCYTES ABSOLUTE: 0.7 K/UL (ref 0–1.3)
MONOCYTES RELATIVE PERCENT: 14.2 %
NEUTROPHILS ABSOLUTE: 2.8 K/UL (ref 1.7–7.7)
NEUTROPHILS RELATIVE PERCENT: 60.4 %
PDW BLD-RTO: 14 % (ref 12.4–15.4)
PLATELET # BLD: 180 K/UL (ref 135–450)
PMV BLD AUTO: 9.7 FL (ref 5–10.5)
RBC # BLD: 3.88 M/UL (ref 4–5.2)
WBC # BLD: 4.7 K/UL (ref 4–11)

## 2021-09-30 PROCEDURE — 92526 ORAL FUNCTION THERAPY: CPT

## 2021-09-30 PROCEDURE — 6370000000 HC RX 637 (ALT 250 FOR IP): Performed by: HOSPITALIST

## 2021-09-30 PROCEDURE — 1200000000 HC SEMI PRIVATE

## 2021-09-30 PROCEDURE — 92610 EVALUATE SWALLOWING FUNCTION: CPT

## 2021-09-30 PROCEDURE — 99232 SBSQ HOSP IP/OBS MODERATE 35: CPT | Performed by: HOSPITALIST

## 2021-09-30 PROCEDURE — 85025 COMPLETE CBC W/AUTO DIFF WBC: CPT

## 2021-09-30 PROCEDURE — 6360000002 HC RX W HCPCS: Performed by: HOSPITALIST

## 2021-09-30 PROCEDURE — 2580000003 HC RX 258: Performed by: HOSPITALIST

## 2021-09-30 RX ADMIN — ENOXAPARIN SODIUM 30 MG: 30 INJECTION SUBCUTANEOUS at 09:14

## 2021-09-30 RX ADMIN — TIMOLOL MALEATE 1 DROP: 5 SOLUTION OPHTHALMIC at 09:15

## 2021-09-30 RX ADMIN — FLECAINIDE ACETATE 50 MG: 100 TABLET ORAL at 20:05

## 2021-09-30 RX ADMIN — MEROPENEM 500 MG: 500 INJECTION, POWDER, FOR SOLUTION INTRAVENOUS at 23:49

## 2021-09-30 RX ADMIN — SODIUM CHLORIDE 25 ML: 9 INJECTION, SOLUTION INTRAVENOUS at 23:49

## 2021-09-30 RX ADMIN — BRIMONIDINE TARTRATE 1 DROP: 2 SOLUTION OPHTHALMIC at 20:05

## 2021-09-30 RX ADMIN — MEROPENEM 500 MG: 500 INJECTION, POWDER, FOR SOLUTION INTRAVENOUS at 12:11

## 2021-09-30 RX ADMIN — FLECAINIDE ACETATE 50 MG: 100 TABLET ORAL at 09:14

## 2021-09-30 RX ADMIN — MINOXIDIL 5 MG: 2.5 TABLET ORAL at 09:14

## 2021-09-30 RX ADMIN — AMLODIPINE BESYLATE 10 MG: 5 TABLET ORAL at 09:14

## 2021-09-30 RX ADMIN — PANTOPRAZOLE SODIUM 40 MG: 40 TABLET, DELAYED RELEASE ORAL at 05:51

## 2021-09-30 RX ADMIN — BRIMONIDINE TARTRATE 1 DROP: 2 SOLUTION OPHTHALMIC at 09:15

## 2021-09-30 RX ADMIN — MEROPENEM 500 MG: 500 INJECTION, POWDER, FOR SOLUTION INTRAVENOUS at 00:03

## 2021-09-30 RX ADMIN — TIMOLOL MALEATE 1 DROP: 5 SOLUTION OPHTHALMIC at 20:05

## 2021-09-30 RX ADMIN — METOPROLOL TARTRATE 100 MG: 50 TABLET, FILM COATED ORAL at 09:14

## 2021-09-30 ASSESSMENT — PAIN SCALES - GENERAL: PAINLEVEL_OUTOF10: 0

## 2021-09-30 NOTE — PROGRESS NOTES
Hospitalist Progress Note      PCP: Salima Cummings MD    Date of Admission: 9/27/2021        Hospital Course:     64 y.o. female with dementia, PAF, hypertension was sent to the emergency room from her skilled nursing facility with generalized weakness, poor appetite and elevated white blood cell count. .. Patient is a poor historian given her dementia. History is obtained from ED staff and review of records . Yeny Marie There is no reported fever , shortness of breath, cough, abdominal pain, nausea, vomiting, diarrhea, headaches, dysuria, frequency or any other complaints except for fatigue. .  In the emergency room, BP was 171/115, pulse 114, respirations 16, temperature 98. 3. . WBC was 15. . Chest x-ray showed small left lower lobe infiltrate. . Procalcitonin was elevated at 0.5. Yeny Marie Serum lactate was normal... UA showed no pyuria. . There was no clinical evidence of skin or soft tissue infection or serious infection. No other localizing infections identified. Yeny Marie She was noted to have a sodium of 152 and acutely elevated creatinine 1.5  Patient was given IV meropenem and vancomycin empirically and IV fluid bolus. Subjective:     No acute events overnight. She is not eating much.   No fevers or chills    Medications:  Reviewed    Infusion Medications    sodium chloride       Scheduled Medications    metoprolol tartrate  100 mg Oral BID    amLODIPine  10 mg Oral Daily    minoxidil  5 mg Oral BID    flecainide  50 mg Oral BID    timolol  1 drop Both Eyes BID    brimonidine  1 drop Both Eyes BID    sodium chloride flush  5-40 mL IntraVENous 2 times per day    enoxaparin  30 mg SubCUTAneous Daily    pantoprazole  40 mg Oral QAM AC    meropenem  500 mg IntraVENous Q12H     PRN Meds: labetalol, sodium chloride flush, sodium chloride, polyethylene glycol, ondansetron      Intake/Output Summary (Last 24 hours) at 9/30/2021 1120  Last data filed at 9/30/2021 0946  Gross per 24 hour   Intake 120 ml   Output 1450 ml Net -1330 ml       Physical Exam Performed:    /74   Pulse 80   Temp 98.9 °F (37.2 °C) (Axillary)   Resp 16   Ht 5' 3\" (1.6 m)   Wt 104 lb 11.5 oz (47.5 kg)   SpO2 95%   BMI 18.55 kg/m²     General appearance: No apparent distress, appears stated age and cooperative. HEENT: Pupils equal, round, and reactive to light. Conjunctivae/corneas clear. Neck: Supple, with full range of motion. No jugular venous distention. Trachea midline. Respiratory:  Normal respiratory effort. Clear to auscultation, bilaterally without Rales/Wheezes/Rhonchi. Cardiovascular: Regular rate and rhythm with normal S1/S2 without murmurs, rubs or gallops. Abdomen: Soft, non-tender, non-distended with normal bowel sounds. Musculoskeletal: No clubbing, cyanosis or edema bilaterally. Full range of motion without deformity. Skin: Skin color, texture, turgor normal.  No rashes or lesions. Neurologic:  Neurovascularly intact without any focal sensory/motor deficits. Cranial nerves: II-XII intact, grossly non-focal.  Psychiatric: Alert and oriented to self  Capillary Refill: Brisk,3 seconds, normal   Peripheral Pulses: +2 palpable, equal bilaterally       Labs:   Recent Labs     09/28/21  0530 09/29/21  0610 09/30/21  0550   WBC 8.6 5.4 4.7   HGB 12.2 11.8* 11.9*   HCT 36.2 34.9* 34.9*    175 180     Recent Labs     09/27/21  1450 09/28/21  0530 09/29/21  0610    149* 148*   K 3.0* 3.7 3.4*    114* 111*   CO2 24 25 27   BUN 47* 39* 22*   CREATININE 1.2 0.9 0.9   CALCIUM 8.5 9.0 8.7     No results for input(s): AST, ALT, BILIDIR, BILITOT, ALKPHOS in the last 72 hours. No results for input(s): INR in the last 72 hours. No results for input(s): Reba Horn in the last 72 hours.     Urinalysis:      Lab Results   Component Value Date    NITRU Negative 09/27/2021    WBCUA 3-5 09/27/2021    BACTERIA Rare 09/27/2021    RBCUA 3-4 09/27/2021    BLOODU Negative 09/27/2021    SPECGRAV 1.015 09/27/2021    GLUCOSEU Negative 09/27/2021    GLUCOSEU NEGATIVE 02/03/2012       Radiology:  XR ELBOW LEFT (2 VIEWS)   Final Result   No acute fracture, dislocation, or sign of joint effusion. XR WRIST LEFT (2 VIEWS)   Final Result   No acute fracture or dislocation at the left wrist.         XR CHEST PORTABLE   Final Result   Strandy opacity at the left base there are present subsegmental atelectasis   rather than infiltrate. The remaining lungs are stable. Assessment/Plan:      -Hypernatremia with dehydration-sodium 151 on presentation. Improving. Improved with D5--patient continues to have poor oral intake--monitor BMP  -Acute kidney injuryprerenal in the setting of dehydration and ARB use. . Creatinine was elevated 1.5, baseline is about 0.9. --resolved with IV fluids. Continue to monitor  -Hypokalemiarepleted  -Sepsis likely due to pneumonia--patient with sinus tachycardia, leukocytosis. -Lactate is normal.-Received IV fluids 30 cc/kg in the ED-resolved. Continue antibiotics and follow-up on culture data.  -Suspected left basilar bacterial pneumonia/HCAP. Cloteal Means Chest x-ray showed left basilar infiltrate. . Procalcitonin was elevated 0.5 with  leukocytosis and tachycardia. .. Continue IV meropenem . discontinue vancomycin. . Patient has multiple drug allergies across multiple classes. .    -COVID-19 infectionpresent on admission--- not requiring oxygen. Continue supportive care.   -Essential pretensionuncontrolled-continue metoprolol and minoxidil-doses adjusted, added metoprolol. Nephrology consult appreciated.  Medications limited by multiple drug allergies  -Advanced dementia-continue supportive care  -Paroxysmal A. Fib-sinus tach on presentation-not anticoagulated given history of GI bleed--continue flecainide and metoprolol  -Follicular lymphoma--follows with oncologynot yet required treatment   -Anorexia/poor oral intake related to advanced dementia, acute infection--encourage oral intake, oral supplements ordered by nutritionist... Nicole Chow not inclined to have a PEG at this time        DVT Prophylaxis: Lovenox  Diet: Adult Oral Nutrition Supplement; Standard High Calorie/High Protein Oral Supplement  Adult Oral Nutrition Supplement; Frozen Oral Supplement  ADULT DIET; Regular  Code Status: Full Code    Disposition. .. To SNF once better.   Palliative care to discuss with family about goals of care/CODE STATUSpatient is not eating--will discuss with case management    Demond Irby MD

## 2021-09-30 NOTE — PLAN OF CARE
Problem: Falls - Risk of:  Goal: Will remain free from falls  Description: Will remain free from falls  9/29/2021 2203 by Rowan Delgadillo RN  Outcome: Ongoing  9/29/2021 1034 by Lynne Guallpa RN  Outcome: Ongoing     Problem: Falls - Risk of:  Goal: Absence of physical injury  Description: Absence of physical injury  9/29/2021 2203 by Rowan Delgadillo RN  Outcome: Ongoing  9/29/2021 1034 by Lynne Guallpa RN  Outcome: Ongoing     Problem: Skin Integrity:  Goal: Will show no infection signs and symptoms  Description: Will show no infection signs and symptoms  9/29/2021 2203 by Rowan Delgadillo RN  Outcome: Ongoing  9/29/2021 1034 by Lynne Guallpa RN  Outcome: Ongoing     Problem: Skin Integrity:  Goal: Absence of new skin breakdown  Description: Absence of new skin breakdown  9/29/2021 2203 by Rowan Delgadillo RN  Outcome: Ongoing  9/29/2021 1034 by Lynne Guallpa RN  Outcome: Ongoing

## 2021-09-30 NOTE — PLAN OF CARE
Problem: Falls - Risk of:  Goal: Will remain free from falls  Description: Will remain free from falls  Outcome: Ongoing  Goal: Absence of physical injury  Description: Absence of physical injury  Outcome: Ongoing     Problem: Pain:  Goal: Pain level will decrease  Description: Pain level will decrease  Outcome: Ongoing  Goal: Control of acute pain  Description: Control of acute pain  Outcome: Ongoing     Problem: Pain:  Goal: Pain level will decrease  Description: Pain level will decrease  Outcome: Ongoing     Problem: Pain:  Goal: Pain level will decrease  Description: Pain level will decrease  Outcome: Ongoing

## 2021-09-30 NOTE — PROGRESS NOTES
Speech Language Pathology  Facility/Department: [place] [address] - MED SURG/ORTHO   CLINICAL BEDSIDE SWALLOW EVALUATION    NAME: Magdalena Leon  : 1945  MRN: [id number]    ADMISSION DATE: 2021  ADMITTING DIAGNOSIS: has Hypertension, uncontrolled; LFTs abnormal; Thyroid cyst; Migraine; Arrhythmia; Gout; Diarrhea; Ileus ([address] PAF (paroxysmal atrial fibrillation) ([address] N&V (nausea and vomiting); Anemia; Hypokalemia; Vomiting and diarrhea; Partial small bowel obstruction (HCC); GERD (gastroesophageal reflux disease); Thyroid disease; Small bowel obstruction ([address] Chest pain; HTN (hypertension), malignant; Hypertensive left ventricular hypertrophy, without heart failure; Diastolic dysfunction; Closed right hip fracture, initial encounter ([address] Dementia without behavioral disturbance ([address] MANDY (acute kidney injury) ([address] UTI (urinary tract infection); Altered mental status, unspecified; Late onset Alzheimer's dementia with behavioral disturbance ([address] and Hypernatremia on their problem list.  ONSET DATE: admit date 21    Recent Chest Xray/CT of Chest: (Date 21 )  Aurora Hector opacity at the left base there are present subsegmental atelectasis   rather than infiltrate.  The remaining lungs are stable. Date of Eval: 2021  Evaluating Therapist: BEATA Le     Vitals/labs:   Temp: n/a  SpO2: 94% on RA  RR: 16/min  BP: 98/52  HR: 64    Current Diet level:  Current Diet : Regular  Current Liquid Diet : Thin      Primary Complaint  Patient Complaint: \"I don't want to eat. \"    Pain:  Pain Assessment  Pain Assessment: Advanced Dementia  Pain Level: 0  RASS Score: Light Sedation - Patient awakens with eye opening and eye contact, but not sustained  POSS Score (Patient Ctrl Analgesia):  S  PAINAD (Pain Assessment in Advance Dementia)  Breathing: normal  Negative Vocalization: none  Facial Expression: smiling or inexpressive  Body Language: relaxed  Consolability: no need to console  PAINAD Score: 0    Reason for Referral  Francisco Ritter was referred for a bedside swallow evaluation to assess the efficiency of her swallow function, identify signs and symptoms of aspiration and make recommendations regarding safe dietary consistencies, effective compensatory strategies, and safe eating environment. Impression  Dysphagia Diagnosis: Swallow function appears grossly intact;Mild oral stage dysphagia  Dysphagia Outcome Severity Scale: Level 5: Mild dysphagia- Distant supervision. May need one diet consistency restricted     Treatment Plan  Requires SLP Intervention: Yes  Duration/Frequency of Treatment: 2-3x/wk for 1 week until 10/06/21          Recommended Diet and Intervention  Diet Solids Recommendation: Dysphagia Soft and Bite-Sized (Dysphagia III)  Liquid Consistency Recommendation: Thin  Recommended Form of Meds: Meds in puree          Compensatory Swallowing Strategies  Compensatory Swallowing Strategies: Upright as possible for all oral intake;Eat/Feed slowly; Remain upright for 30-45 minutes after meals;Small bites/sips    Treatment/Goals  Short-term Goals  Timeframe for Short-term Goals: 1 week, 2-3x/wk until 10/06/21  Long-term Goals  Timeframe for Long-term Goals: 1-2 weeks  Goal 1: Pt will tolerate recommended diet without observed s/s of aspiration. Dysphagia Goals: The patient will tolerate recommended diet without observed clinical signs of aspiration    General  Chart Reviewed: Yes  Behavior/Cognition: Alert;Confused; Distractible;Requires cueing;Doesn't follow directions  O2 Device: Nasal cannula  Follows Directions: Simple  Dentition: Adequate  Patient Positioning: Upright in bed  Baseline Vocal Quality: Normal  Volitional Cough: Strong  Prior Dysphagia History: Pt admitted with covid 19 from SNF. Per spouse report in chart notes, pt's po intake has been very poor. Consistencies Administered: Dysphagia Soft and Bite-Sized (Dysphagia III); Dysphagia Pureed (Dysphagia I); Thin - cup; Thin - straw;Thin - teaspoon; Ice Chips           Vision/Hearing  Vision  Vision: Impaired  Hearing  Hearing: Exceptions to [facility]  Hearing Exceptions: Hard of hearing/hearing concerns    Oral Motor Deficits  Oral/Motor  Oral Motor: Exceptions to [facility]  Labial Strength: Reduced  Lingual Strength: Reduced    Oral Phase Dysfunction  Oral Phase  Oral Phase: Exceptions  Oral Phase Dysfunction  Impaired Mastication: Soft Solid  Oral Phase  Oral Phase - Comment: Prolonged mastication of soft food trials. This may be attributed, in part, to pt's very limited interest in eating. No oral residuals post swallow. No anterior loss of any po trials this date. Indicators of Pharyngeal Phase Dysfunction   Pharyngeal Phase  Pharyngeal Phase: WNL  Pharyngeal Phase   Pharyngeal: RR 16/min prior to po trials with O2 sat of 94% on RA. No janette s/s of aspiration noted with any po trials this date with the excption of congested coughing noted post swallow with thin liquids via straw. No overt s/s of aspiration noted with cup sip trials of thin liquids. RR stable at 16/min following po trials with O2 sat of 93%. Prognosis  Prognosis  Prognosis for safe diet advancement: fair  Barriers to reach goals: cognitive deficits  Individuals consulted  Consulted and agree with results and recommendations: Patient;RN    Education  Patient Education: Dysphagia Tx: Education with pt and RN regarding results of BSE, aspiration precautions, and SLP plan of care  Patient Education Response: Verbalizes understanding; No evidence of learning  Safety Devices in place: Yes  Type of devices: All fall risk precautions in place; Bed alarm in place;Call light within reach       Therapy Time  SLP Individual Minutes  Time In: 1344  Time Out: [address]  Minutes: [address] [facility].  [address]  Speech-Language Pathologist      9/30/2021 3:01 PM

## 2021-09-30 NOTE — PROGRESS NOTES
Nephrology Progress  Note                                                                                                                                                                                                                                                                                                                                                               Office : [phone]     Fax :[phone]              Patient's Name: Raffaele Tucker  12:09 PM  9/30/2021    Reason for Consult:  HTN uncontrolled, Hypernatremia  Requesting Physician:  Duke Jack MD      Chief Complaint:  Weakness       Interval History :    BP better controlled    BMP lab not available        History of Present Ilness:    Raffaele Tucker is a 64 y.o. female with PMH of dementia, HTN     Presented with c/o weakness  Generalized weakness  Poor appetite    In the emergency room, BP was 171/115, pulse 114, respirations 16, temperature 98. 3. . WBC was 15. Berton Baller x-ray showed small left lower lobe infiltrate. . Procalcitonin was elevated at 0.5.. Serum lactate was normal... UA showed no pyuria. Tony Ship was no clinical evidence of skin or soft tissue infection or serious infection.  No other localizing infections identified. Linh Monahan was noted to have a sodium of 152 and acutely elevated creatinine 1.5    Nephrology consult for management of hypernatremia and uncontrolled HTN     reports longstanding history of labile, uncontrolled hypertension managed by cardiology-and apppears had w/u for secondary HTN in the past. Medications limited by multiple drug allergies  Past Medical History:   Diagnosis Date    Abnormal LFT's     Arthritis     Atrial [facility] ([facility]     COVID-19 87/62/9576    Follicular lymphoma ([facility]     Dr Boris Miranda GERD (gastroesophageal reflux disease)     Glaucoma     Gout 11/02/2010    Hypertension     Liver disease     steatohepatitis    Migraine     Non-ulcer dyspepsia     egd neg 11/2011 (NORMODYNE;TRANDATE) injection 10 mg, Q4H PRN  metoprolol tartrate (LOPRESSOR) tablet 100 mg, BID  amLODIPine (NORVASC) tablet 10 mg, Daily  minoxidil (LONITEN) tablet 5 mg, BID  flecainide (TAMBOCOR) tablet 50 mg, BID  timolol (TIMOPTIC) 0.5 % ophthalmic solution 1 drop, BID  brimonidine (ALPHAGAN) 0.2 % ophthalmic solution 1 drop, BID  sodium chloride flush 0.9 % injection 5-40 mL, 2 times per day  sodium chloride flush 0.9 % injection 5-40 mL, PRN  0.9 % sodium chloride infusion, PRN  enoxaparin (LOVENOX) injection 30 mg, Daily  polyethylene glycol (GLYCOLAX) packet 17 g, Daily PRN  pantoprazole (PROTONIX) tablet 40 mg, QAM AC  ondansetron (ZOFRAN-ODT) disintegrating tablet 4 mg, Q8H PRN  meropenem (MERREM) 500 mg IVPB (mini-bag), Q12H        Review of Systems:   14 point ROS obtained but were negative except mentioned in HPI      Physical exam:     Vitals:  /74   Pulse 80   Temp 98.9 °F (37.2 °C) (Axillary)   Resp 16   Ht 5' 3\" (1.6 m)   Wt 104 lb 11.5 oz (47.5 kg)   SpO2 95%   BMI 18.55 kg/m²   Constitutional:  OAA X2 NAD  Skin: no rash, turgor wnl  Heent:  eomi, mmm  Neck: no bruits or jvd noted  Cardiovascular:  S1, S2 without m/r/g  Respiratory: CTA B without w/r/r  Abdomen:  +bs, soft, nt, nd  Ext: no  lower extremity edema  Psychiatric: deferred   Musculoskeletal:  Rom, muscular strength intact    Data:   Labs:  CBC:   Recent Labs     09/28/21  0530 09/29/21  0610 09/30/21  0550   WBC 8.6 5.4 4.7   HGB 12.2 11.8* 11.9*    175 180     BMP:    Recent Labs     09/27/21  1450 09/28/21  0530 09/29/21  0610    149* 148*   K 3.0* 3.7 3.4*    114* 111*   CO2 24 25 27   BUN 47* 39* 22*   CREATININE 1.2 0.9 0.9   GLUCOSE 362* 147* 137*     Ca/Mg/Phos:   Recent Labs     09/27/21  1450 09/28/21  0530 09/29/21  0610   CALCIUM 8.5 9.0 8.7     Hepatic:   No results for input(s): AST, ALT, ALB, BILITOT, ALKPHOS in the last 72 hours.   Troponin:   No results for input(s): TROPONINI in the last 72 hours. BNP: No results for input(s): BNP in the last 72 hours. Lipids: No results for input(s): CHOL, TRIG, HDL, LDLCALC, LABVLDL in the last 72 hours. ABGs: No results for input(s): PHART, PO2ART, OCN6RPL in the last 72 hours. INR: No results for input(s): INR in the last 72 hours. UA:  No results for input(s): Shane Jews, GLUCOSEU, BILIRUBINUR, KETUA, SPECGRAV, BLOODU, PHUR, PROTEINU, UROBILINOGEN, NITRU, LEUKOCYTESUR, Rody Soda in the last 72 hours. Urine Microscopic:   No results for input(s): LABCAST, BACTERIA, COMU, HYALCAST, WBCUA, RBCUA, EPIU in the last 72 hours. Urine Culture:   No results for input(s): LABURIN in the last 72 hours. Urine Chemistry: No results for input(s): Junious Puls, PROTEINUR, NAUR in the last 72 hours. IMAGING:  XR ELBOW LEFT (2 VIEWS)   Final Result   No acute fracture, dislocation, or sign of joint effusion. XR WRIST LEFT (2 VIEWS)   Final Result   No acute fracture or dislocation at the left wrist.         XR CHEST PORTABLE   Final Result   Strandy opacity at the left base there are present subsegmental atelectasis   rather than infiltrate. The remaining lungs are stable. Assessment/Plan     1. Hypernatremia    Etiology : intravascular free water depletion     Serum Na  151 ( 9/27 ) -----> 149   -----> 148     Getting D5 water @ 50 ml/hr    Plan    Give D5 water @ 100 ml/hr    Moniter serum Na    2. Uncontrolled HTN    Better controlled     Continue  Metoprolol to 100 mg BID    Continue Minoxidil 5 mg BID    Continue  Amlodipine 10 mg/day  ( she has allergy registered as Vomiting , rash   ,, no anaphylaxis type allergy documented)    Will closely moniter     3.  MANDY     Improved    Pre renal MANDY       4 PNA    AB and management per primary team      5 Afib              Thank you for allowing us to participate in care of Nieves Rutherford MD  Feel free to contact me   [facility] [address]  Office : [phone]  Fax :[phone]\

## 2021-10-01 LAB
ANION GAP SERPL CALCULATED.3IONS-SCNC: 8 MMOL/L (ref 3–16)
BASOPHILS ABSOLUTE: 0 K/UL (ref 0–0.2)
BASOPHILS RELATIVE PERCENT: 0.5 %
BLOOD CULTURE, ROUTINE: NORMAL
BUN BLDV-MCNC: 21 MG/DL (ref 7–20)
CALCIUM SERPL-MCNC: 8.2 MG/DL (ref 8.3–10.6)
CHLORIDE BLD-SCNC: 102 MMOL/L (ref 99–110)
CO2: 27 MMOL/L (ref 21–32)
CREAT SERPL-MCNC: 0.8 MG/DL (ref 0.6–1.2)
CULTURE, BLOOD 2: NORMAL
EOSINOPHILS ABSOLUTE: 0 K/UL (ref 0–0.6)
EOSINOPHILS RELATIVE PERCENT: 0.7 %
GFR AFRICAN AMERICAN: >60
GFR NON-AFRICAN AMERICAN: >60
GLUCOSE BLD-MCNC: 134 MG/DL (ref 70–99)
HCT VFR BLD CALC: 37.5 % (ref 36–48)
HEMOGLOBIN: 12.9 G/DL (ref 12–16)
LYMPHOCYTES ABSOLUTE: 1.3 K/UL (ref 1–5.1)
LYMPHOCYTES RELATIVE PERCENT: 23.2 %
MCH RBC QN AUTO: 30.8 PG (ref 26–34)
MCHC RBC AUTO-ENTMCNC: 34.3 G/DL (ref 31–36)
MCV RBC AUTO: 89.8 FL (ref 80–100)
MONOCYTES ABSOLUTE: 0.6 K/UL (ref 0–1.3)
MONOCYTES RELATIVE PERCENT: 10.3 %
NEUTROPHILS ABSOLUTE: 3.5 K/UL (ref 1.7–7.7)
NEUTROPHILS RELATIVE PERCENT: 65.3 %
PDW BLD-RTO: 13.8 % (ref 12.4–15.4)
PLATELET # BLD: 212 K/UL (ref 135–450)
PMV BLD AUTO: 9.8 FL (ref 5–10.5)
POTASSIUM SERPL-SCNC: 3.9 MMOL/L (ref 3.5–5.1)
RBC # BLD: 4.18 M/UL (ref 4–5.2)
SODIUM BLD-SCNC: 137 MMOL/L (ref 136–145)
WBC # BLD: 5.4 K/UL (ref 4–11)

## 2021-10-01 PROCEDURE — 85025 COMPLETE CBC W/AUTO DIFF WBC: CPT

## 2021-10-01 PROCEDURE — 92526 ORAL FUNCTION THERAPY: CPT

## 2021-10-01 PROCEDURE — 6360000002 HC RX W HCPCS: Performed by: HOSPITALIST

## 2021-10-01 PROCEDURE — 1200000000 HC SEMI PRIVATE

## 2021-10-01 PROCEDURE — 2580000003 HC RX 258: Performed by: HOSPITALIST

## 2021-10-01 PROCEDURE — 97530 THERAPEUTIC ACTIVITIES: CPT

## 2021-10-01 PROCEDURE — 6370000000 HC RX 637 (ALT 250 FOR IP): Performed by: HOSPITALIST

## 2021-10-01 PROCEDURE — 99233 SBSQ HOSP IP/OBS HIGH 50: CPT | Performed by: HOSPITALIST

## 2021-10-01 PROCEDURE — 80048 BASIC METABOLIC PNL TOTAL CA: CPT

## 2021-10-01 RX ADMIN — ENOXAPARIN SODIUM 30 MG: 30 INJECTION SUBCUTANEOUS at 10:18

## 2021-10-01 RX ADMIN — Medication 10 ML: at 10:18

## 2021-10-01 RX ADMIN — BRIMONIDINE TARTRATE 1 DROP: 2 SOLUTION OPHTHALMIC at 22:11

## 2021-10-01 RX ADMIN — PANTOPRAZOLE SODIUM 40 MG: 40 TABLET, DELAYED RELEASE ORAL at 05:56

## 2021-10-01 RX ADMIN — BRIMONIDINE TARTRATE 1 DROP: 2 SOLUTION OPHTHALMIC at 10:16

## 2021-10-01 RX ADMIN — METOPROLOL TARTRATE 100 MG: 50 TABLET, FILM COATED ORAL at 10:17

## 2021-10-01 RX ADMIN — FLECAINIDE ACETATE 50 MG: 100 TABLET ORAL at 10:16

## 2021-10-01 RX ADMIN — MINOXIDIL 5 MG: 2.5 TABLET ORAL at 10:17

## 2021-10-01 RX ADMIN — TIMOLOL MALEATE 1 DROP: 5 SOLUTION OPHTHALMIC at 10:16

## 2021-10-01 RX ADMIN — Medication 10 ML: at 22:16

## 2021-10-01 RX ADMIN — TIMOLOL MALEATE 1 DROP: 5 SOLUTION OPHTHALMIC at 22:13

## 2021-10-01 RX ADMIN — AMLODIPINE BESYLATE 10 MG: 5 TABLET ORAL at 10:17

## 2021-10-01 RX ADMIN — MEROPENEM 500 MG: 500 INJECTION, POWDER, FOR SOLUTION INTRAVENOUS at 12:46

## 2021-10-01 ASSESSMENT — PAIN SCALES - GENERAL: PAINLEVEL_OUTOF10: 0

## 2021-10-01 NOTE — PROGRESS NOTES
Speech Language Pathology  Facility/Department: [facility] C5 - MED SURG/ORTHO  Dysphagia Daily Treatment Note      Recommendations:  Solid Consistency: Downgrade to Dysphagia II (minced and moist) diet   Liquid Consistency: Thin liquids  Medication: Meds whole with puree or TL  *Pt will require assistance with meals  *Encourage PO intake as able      NAME: Azalia Tinoco  : 1945  MRN: [id number]    Patient Diagnosis(es):   Patient Active Problem List    Diagnosis Date Noted    Hypernatremia 2021    UTI (urinary tract infection) 2021    Altered mental status, unspecified 2021    Late onset Alzheimer's dementia with behavioral disturbance ([facility] 2021    Dementia without behavioral disturbance ([facility]     MANDY (acute kidney injury) ([facility]     Closed right hip fracture, initial encounter ([facility] 2020    HTN (hypertension), malignant     Hypertensive left ventricular hypertrophy, without heart failure     Diastolic dysfunction     Chest pain 2019    Small bowel obstruction ([facility] 2015    GERD (gastroesophageal reflux disease)     Thyroid disease     Partial small bowel obstruction (HCC) 2012    Hypokalemia 2012    Vomiting and diarrhea 2012    PAF (paroxysmal atrial fibrillation) (HCC) 2011    N&V (nausea and vomiting) 2011    Anemia 2011    Diarrhea 2011    Ileus ([facility] 2011    Migraine 2010    Arrhythmia 2010    Gout 2010    Hypertension, uncontrolled 2010    LFTs abnormal 2010    Thyroid cyst 2010     Allergies:    Allergies   Allergen Reactions    Latex Hives    Ferric Carboxymaltose Anaphylaxis    Aldactone [Spironolactone]     Baclofen Other (See Comments)     Dizziness, hallucinations    Cardizem [Diltiazem Hcl]      Increases liver enzymes    Dilaudid [Hydromorphone Hcl] Other (See Comments)     Vomitting, and dizziness    Droperidol     Guanfacine Hcl Itching     Night terrors,insomnia,extreme dizziness    Hydrochlorothiazide      Causes gout    Ibuprofen Hives    Lasix [Furosemide]     Lorazepam     Nexium [Esomeprazole Magnesium Trihydrate] Other (See Comments)     Oral sores    Nortriptyline Other (See Comments)     Severe night terrors    Other      Flu shot    Phenergan [Promethazine Hcl]     Phenergan [Promethazine Hcl]      Tardive dyskinesia    Phenothiazines      Causes parkinson sx    Pilocarpine     Prochlorperazine Edisylate     Reglan [Metoclopramide Hcl]     Rofecoxib     Tramadol     Triavil [Perphenazine-Amitriptyline]      Parkinson sxs    Ultracet [Tramadol-Acetaminophen] Other (See Comments)     dizziness    Valium     Vioxx Other (See Comments)     Facial and lip swelling    Zofran Odt [Ondansetron]      Zofran PO is ok    Amlodipine Itching, Nausea And Vomiting and Swelling    Amoxicillin Rash    Augmentin [Amoxicillin-Pot Clavulanate] Itching and Rash    Avelox [Moxifloxacin] Rash    Bactrim [Sulfamethoxazole-Trimethoprim] Nausea And Vomiting     And thrush/yeast unfection    Biaxin [Clarithromycin] Itching and Rash    Cephalexin Rash    Clonidine Derivatives Anxiety    Codeine Nausea And Vomiting and Rash    Coreg [Carvedilol] Rash    Demerol Nausea And Vomiting    Hydralazine Itching, Swelling and Rash    Levaquin [Levofloxacin In D5w] Nausea And Vomiting    Maxalt [Rizatriptan Benzoate] Palpitations    Morphine And Related Rash    Scopolamine Nausea And Vomiting     severe    Zonisamide Anxiety     Subjective: Pt seen upright in bed, quite fatigued. RN OK'd SLP entry and therapy and at bedside initially to administer PO meds. Pain: no c/o pain    Current Diet: Adult Oral Nutrition Supplement; Standard High Calorie/High Protein Oral Supplement  Adult Oral Nutrition Supplement; Frozen Oral Supplement  ADULT DIET;  Dysphagia - Minced and Moist    Diet Tolerance:  Patient tolerating current diet level without signs/symptoms of penetration / aspiration. Continued poor PO intake noted per chart, dietician consulted. P.O. Trials: Thin   x >8 oz via straw (water, orange juice, Ensure)     Nectar / Mildly Thick       Honey / Moderately Thick       Pudding / Extremely Thick       Puree   x 1/4 container of applesauce (with and without PO meds), via <1/2 tsp     Solid   x x3 <1/2 tsp jello, x2 small bites Macedonian toast, x2 small bites of strawberry       Dysphagia Treatment and Impressions:  Pt is on RA. O2 sats 93-94% and RR 16/min. Pt required max cues for adequate BRIAN for PO trials, improved BRIAN as session progressed (although had eyes closed intermittently throughout session). She required max cues to follow commands when attempting administration of PO meds, RN present. She required max cues to form adequate labial seal on tsp / straw, open oral cavity. No clinical s/s of aspiration/penetration noted throughout PO trials. Pt accepts <1/2 tsp amounts of solid PO. Noted pt independently using lingual sweep at times. Pt with reduced A-P bolus transit and prolonged mastication with soft solid trials (Macedonian toast) with min-mod oral / lingual residue post-swallow. Pt required multiple cued dry swallow and use of liquid wash to successfully clear residue. Recommend downgrade to Dysphagia II (minced and moist) diet this date, continue thin liquids, meds whole with TL or puree as able. ST to continue to follow. Dysphagia Goals:  Timeframe for Long-term Goals: 1-2 weeks  Goal 1: Pt will tolerate recommended diet without observed s/s of aspiration. 10/01: ongoing, see above. Short-term Goals  Timeframe for Short-term Goals: 1 week, 2-3x/wk until 10/06/21  Dysphagia Goals: The patient will tolerate recommended diet without observed clinical signs of aspiration.  10/01: ongoing, see above    Speech/Language/Cog Goals: n/a    Recommendations:  Solid Consistency: Downgrade to Dysphagia II (minced and moist) diet   Liquid Consistency: Thin liquids  Medication: Meds whole with puree or TL  *Pt will require assistance with meals  *Encourage PO intake as able    Patient/Family/Caregiver Education:  SLP re: role of ST, rationale for PO intake. Pt unable to demonstrate evidence of learning, pt has dementia per chart. RN aware of recs. Compensatory Strategies:   HOB 90* and 30\" after meals; small bites/sips; alternate solids/liquids every 3-5 bites; oral care after every meal; assist feed    Plan:    Continued Dysphagia treatment with goals per plan of care. Discharge Recommendations: SNF    If pt discharges from hospital prior to Speech/Swallowing discharge, this note serves as tx and discharge summary.      Total Treatment Time / Charges     Time in Time out Total Time / units   Cognitive Tx         Speech Tx      Dysphagia Tx 1005 1045 40 min / 1 unit     Signature:  Hayley Lewis M.S. [employer]  Speech-language pathologist  [id number]  Speech Desk Phone: [phone]

## 2021-10-01 NOTE — PLAN OF CARE
Problem: Falls - Risk of:  Goal: Will remain free from falls  Description: Will remain free from falls  9/30/2021 2113 by Jean Pierre Pablo RN  Outcome: Ongoing  9/30/2021 1833 by Mona Cassidy RN  Outcome: Ongoing     Problem: Falls - Risk of:  Goal: Absence of physical injury  Description: Absence of physical injury  9/30/2021 2113 by Jean Pierre Pablo RN  Outcome: Ongoing  9/30/2021 1833 by Mona Cassidy RN  Outcome: Ongoing     Problem: Skin Integrity:  Goal: Will show no infection signs and symptoms  Description: Will show no infection signs and symptoms  Outcome: Ongoing     Problem: Skin Integrity:  Goal: Absence of new skin breakdown  Description: Absence of new skin breakdown  Outcome: Ongoing

## 2021-10-01 NOTE — PROGRESS NOTES
Occupational Therapy  Facility/Department: [facility] C5 - MED SURG/ORTHO  Daily Treatment Note  NAME: Ann Marie Sheppard  : 1945  MRN: [id number]    Date of Service: 10/1/2021    Discharge Recommendations:  Continue to assess pending progress, Subacute/Skilled Nursing Facility (Family is considering options, including SNF)     Assessment   Assessment: Pt demonstrated poor ability to participate in therapy or respond to any verbal or tactile stimuli. Eyes remained closed with movement of bed and extremities. She is total assist level for all ADLs and repositioning in bed. OT will decrease frequency to 2-3x/week. Question pt's ability to actively participate in OT tx. Cont OT in acute care to assist with d/c recommendations. Prognosis: Poor  OT Education: OT Role--no signs of learning  Barriers to Learning: cognition and poor arousal  REQUIRES OT FOLLOW UP: Yes  Activity Tolerance  Activity Tolerance: Treatment limited secondary to decreased cognition  Safety Devices  Safety Devices in place: Yes  Type of devices: Bed alarm in place;Call light within reach; Left in bed;Nurse notified       Patient Diagnosis(es): The primary encounter diagnosis was Septicemia ([address] Diagnoses of Hypernatremia and MANDY (acute kidney injury) ([address] were also pertinent to this visit. has a past medical history of Abnormal LFT's, Arthritis, Atrial fibrillation ([address] [id number], Follicular lymphoma ([address] GERD (gastroesophageal reflux disease), Glaucoma, Gout, Hypertension, Liver disease, Migraine, Non-ulcer dyspepsia, Peripheral vascular disease ([address] and Thyroid disease. has a past surgical history that includes Thyroidectomy; Hysterectomy; Appendectomy; Tonsillectomy; Cholecystectomy; Cataract removal; Upper gastrointestinal endoscopy; lymph node biopsy; Endoscopy, colon, diagnostic (2011); Colonoscopy (2015);  Upper gastrointestinal endoscopy (2015); hip surgery (Right, 2020); and hip surgery (Right, 6/30/2020). Restrictions  Restrictions/Precautions  Restrictions/Precautions: Fall Risk, Isolation  Position Activity Restriction  Other position/activity restrictions: Telemetry, up with assist, L chest port  Subjective   General  Chart Reviewed: Yes  Patient assessed for rehabilitation services?: Yes  Family / Caregiver Present: No  Referring Practitioner: Dr. Miranda Reyes  Diagnosis: hypernatremia, COVID+  Subjective  Subjective: Pt resting in bed, did not open eyes to stimulation  General Comment  Comments: RN approved therapy  Vital Signs  Patient Currently in Pain:  (EMMA)   Orientation  Orientation  Overall Orientation Status: Impaired  Orientation Level: Unable to assess  Objective    ADL  Grooming: Dependent/Total  UE Dressing: Dependent/Total  Toileting: Dependent/Total        Standing Balance  Activity: Due to poor arousal, pt unable to participate in mobility this session. Cognition  Arousal/Alertness: Unresponsive to stimuli  Following Commands: Does not follow commands  Attention Span: Unable to maintain attention  Cognition Comment: Pt did not respond to stimuli (removal of covers, raising HOB, lifting extremities, etc). Eyes remained closed. Type of ROM/Therapeutic Exercise  Comment: Performed PROM to UE for 3-5 reps. No active motion detected or resistance to movement. Pt did not stir to UE movement.       Plan   Plan  Times per week: 2-3x/week  Current Treatment Recommendations: Balance Training, Functional Mobility Training, Safety Education & Training, Positioning, Self-Care / ADL  AM-PAC Score      AM-[facility] Inpatient Daily Activity Raw Score: 6 (10/01/21 1402)  AM-PAC Inpatient ADL T-Scale Score : 17.07 (10/01/21 1402)  ADL Inpatient CMS 0-100% Score: 100 (10/01/21 1402)  ADL Inpatient CMS G-Code Modifier : CN (10/01/21 1402)    Goals  Short term goals  Time Frame for Short term goals: 1 week trial(10-05-21)  Short term goal 1: mod assist of 2 with functional/toilet transfers with MOI LAUREL by 10-03-21--EMMA d/t poor cognition 10/1  Short term goal 2: min assist with self feeding while up in chair by 10-05-21-EMMA, pt not responsive to participate in feeding  Patient Goals   Patient goals : unable to verbalize     Therapy Time   Individual Concurrent Group Co-treatment   Time In       1325   Time Out       1340   Minutes       15   Timed Code Treatment Minutes: 15 Minutes    If pt is discharged prior to next OT session, this note will serve as the discharge summary.   Amilcar Pack OT

## 2021-10-01 NOTE — PROGRESS NOTES
Comprehensive Nutrition Assessment    Type and Reason for Visit:  Reassess    Nutrition Recommendations/Plan:   1. Continue general, soft and bite sized diet   2. Continue Ensure and Magic Cups   3. Due to inadequate intake, discussion of palliative care vs nutrition support should be considered. Consult dietitian if tube feeding is desired and consistent with patient's plan of care. 4. Monitor nutrition adequacy, pertinent labs, bowel habits, wt changes, and clinical progress    Nutrition Assessment:  Follow up: Pt nutritionally declining AEB diet downgraded to soft and bite sized per SLP recommendations. Poor PO intakes continue this admission, 0-25% and refusing meals per EMR. Consuming 51-75% of Ensure. Palliative care consulted. Will continue to monitor. Malnutrition Assessment:  Malnutrition Status: At risk for malnutrition (Comment)    Context:  Acute Illness       Estimated Daily Nutrient Needs:  Energy (kcal):  2014-8427 kcal; Weight Used for Energy Requirements:  Ideal (52 kg)     Protein (g):  52-62 g/day; Weight Used for Protein Requirements:  Ideal (1-1.2 g/kg)        Fluid (ml/day):   ; Method Used for Fluid Requirements:  1 ml/kcal      Nutrition Related Findings:  Generalized trace edema. Hypoactive BS. Labs reviewed. Wounds:  None       Current Nutrition Therapies:    Adult Oral Nutrition Supplement; Standard High Calorie/High Protein Oral Supplement  Adult Oral Nutrition Supplement; Frozen Oral Supplement  ADULT DIET; Dysphagia - Soft and Bite Sized;  No Drinking Straws    Anthropometric Measures:  · Height: 5' 3\" (160 cm)  · Current Body Weight: 104 lb (47.2 kg)   · Ideal Body Weight: 115 lbs; % Ideal Body Weight 87.8 %   · BMI: 18.4  · BMI Categories: Underweight (BMI less than 22) age over 72       Nutrition Diagnosis:   · Inadequate oral intake related to early satiety as evidenced by poor intake prior to admission, weight loss, BMI, intake 0-25%      Nutrition Interventions: Food and/or Nutrient Delivery:  Continue Current Diet, Continue Oral Nutrition Supplement  Nutrition Education/Counseling:  Education not indicated   Coordination of Nutrition Care:  Continue to monitor while inpatient    Goals:  Consume 50% or greater of 3 meals per day and ONS       Nutrition Monitoring and Evaluation:   Behavioral-Environmental Outcomes:  None Identified   Food/Nutrient Intake Outcomes:  Food and Nutrient Intake, Supplement Intake  Physical Signs/Symptoms Outcomes:  Biochemical Data, Constipation     Discharge Planning:    Continue current diet, Continue Oral Nutrition Supplement     Electronically signed by Suraj Linares MS, RD, LD on 10/1/21 at 9:51 AM EDT    Contact: [phone]

## 2021-10-01 NOTE — CARE COORDINATION
CM called and spoke to [facility] at [facility] and pt cannot return now with being positive for COVID-19. CM following-Mary Figueroa RN       ADDENDUM 1524: LISA notes [facility]/RN spoke to pt's son antoni MAGALLON today and he is wanting to see how mom does the next couple of days. Writer LVM with [facility] as well with contact information and if mom needs SNF placement at dc only a few in the [facility] area accept COVID positive pts.  CM following-Mary Figueroa RN

## 2021-10-01 NOTE — PROGRESS NOTES
Hospitalist Progress Note      PCP: Mine Velazquez MD    Date of Admission: 9/27/2021        Hospital Course:     64 y.o. female with dementia, PAF, hypertension was sent to the emergency room from her skilled nursing facility with generalized weakness, poor appetite and elevated white blood cell count. .. Patient is a poor historian given her dementia. History is obtained from ED staff and review of records . Blayne Means There is no reported fever , shortness of breath, cough, abdominal pain, nausea, vomiting, diarrhea, headaches, dysuria, frequency or any other complaints except for fatigue. .  In the emergency room, BP was 171/115, pulse 114, respirations 16, temperature 98. 3. . WBC was 15. . Chest x-ray showed small left lower lobe infiltrate. . Procalcitonin was elevated at 0.5. Blayne Means Serum lactate was normal... UA showed no pyuria. . There was no clinical evidence of skin or soft tissue infection or serious infection. No other localizing infections identified. Blayne Means She was noted to have a sodium of 152 and acutely elevated creatinine 1.5  Patient was given IV meropenem and vancomycin empirically and IV fluid bolus. Subjective:     Appetite remains poor, not eating. .    Medications:  Reviewed    Infusion Medications    sodium chloride Stopped (10/01/21 0026)     Scheduled Medications    metoprolol tartrate  100 mg Oral BID    amLODIPine  10 mg Oral Daily    minoxidil  5 mg Oral BID    flecainide  50 mg Oral BID    timolol  1 drop Both Eyes BID    brimonidine  1 drop Both Eyes BID    sodium chloride flush  5-40 mL IntraVENous 2 times per day    enoxaparin  30 mg SubCUTAneous Daily    pantoprazole  40 mg Oral QAM AC    meropenem  500 mg IntraVENous Q12H     PRN Meds: labetalol, sodium chloride flush, sodium chloride, polyethylene glycol, ondansetron      Intake/Output Summary (Last 24 hours) at 10/1/2021 1230  Last data filed at 10/1/2021 0850  Gross per 24 hour   Intake 3881.6 ml   Output 1075 ml   Net 2806.6 ml Physical Exam Performed:    /70   Pulse 79   Temp 98.3 °F (36.8 °C) (Oral)   Resp 16   Ht 5' 3\" (1.6 m)   Wt 104 lb 11.5 oz (47.5 kg)   SpO2 95%   BMI 18.55 kg/m²     General appearance: No apparent distress, appears stated age and cooperative. HEENT: Pupils equal, round, and reactive to light. Conjunctivae/corneas clear. Neck: Supple, with full range of motion. No jugular venous distention. Trachea midline. Respiratory:  Normal respiratory effort. Clear to auscultation, bilaterally without Rales/Wheezes/Rhonchi. Cardiovascular: Regular rate and rhythm with normal S1/S2 without murmurs, rubs or gallops. Abdomen: Soft, non-tender, non-distended with normal bowel sounds. Musculoskeletal: No clubbing, cyanosis or edema bilaterally. Full range of motion without deformity. Skin: Skin color, texture, turgor normal.  No rashes or lesions. Neurologic:  Neurovascularly intact without any focal sensory/motor deficits. Cranial nerves: II-XII intact, grossly non-focal.  Psychiatric: Alert and oriented to self  Capillary Refill: Brisk,3 seconds, normal   Peripheral Pulses: +2 palpable, equal bilaterally       Labs:   Recent Labs     09/29/21  0610 09/30/21  0550 10/01/21  0610   WBC 5.4 4.7 5.4   HGB 11.8* 11.9* 12.9   HCT 34.9* 34.9* 37.5    180 212     Recent Labs     09/29/21  0610 10/01/21  0610   * 137   K 3.4* 3.9   * 102   CO2 27 27   BUN 22* 21*   CREATININE 0.9 0.8   CALCIUM 8.7 8.2*     No results for input(s): AST, ALT, BILIDIR, BILITOT, ALKPHOS in the last 72 hours. No results for input(s): INR in the last 72 hours. No results for input(s): Gabriela Lanie in the last 72 hours.     Urinalysis:      Lab Results   Component Value Date    NITRU Negative 09/27/2021    WBCUA 3-5 09/27/2021    BACTERIA Rare 09/27/2021    RBCUA 3-4 09/27/2021    BLOODU Negative 09/27/2021    SPECGRAV 1.015 09/27/2021    GLUCOSEU Negative 09/27/2021    GLUCOSEU NEGATIVE 02/03/2012 Radiology:  XR ELBOW LEFT (2 VIEWS)   Final Result   No acute fracture, dislocation, or sign of joint effusion. XR WRIST LEFT (2 VIEWS)   Final Result   No acute fracture or dislocation at the left wrist.         XR CHEST PORTABLE   Final Result   Strandy opacity at the left base there are present subsegmental atelectasis   rather than infiltrate. The remaining lungs are stable. Assessment/Plan:      -Hypernatremia with dehydration-sodium 151 on presentation. Improving. Improved with D5--patient continues to have poor oral intake--monitor BMP  -Acute kidney injuryprerenal in the setting of dehydration and ARB use. . Creatinine was elevated 1.5, baseline is about 0.9. --resolved with IV fluids. Continue to monitor  -Hypokalemiarepleted  -Sepsis likely due to pneumonia--patient with sinus tachycardia, leukocytosis. -Lactate is normal.-Received IV fluids 30 cc/kg in the ED-resolved with treatment  -Suspected left basilar bacterial pneumonia/HCAP. Piyush Randall Chest x-ray showed left basilar infiltrate. . Procalcitonin was elevated 0.5 with  leukocytosis and tachycardia. .. complete IV meropenem day 5/5 .discontinue vancomycin 9/29. Piyush Randall Patient has multiple drug allergies across multiple classes. .    -COVID-19 infectionpresent on admission--- not requiring oxygen. Continue supportive care.   -Essential pretensionuncontrolled-better controlled on minoxidil, metoprolol continue amlodipine following adjustment of doses. Nephrology consult appreciated.  Medications limited by multiple drug allergies  -Advanced dementia-continue supportive care  -Paroxysmal A. Fib-sinus tach on presentation-not anticoagulated given history of GI bleed--continue flecainide and metoprolol  -Follicular lymphoma--follows with oncologynot yet required treatment   -Anorexia/poor oral intake related to advanced dementia, acute infection--encourage oral intake, oral supplements ordered by nutritionist... poa not inclined to have

## 2021-10-01 NOTE — PROGRESS NOTES
Nephrology Progress  Note                                                                                                                                                                                                                                                                                                                                                               Office : [phone]     Fax :[phone]              Patient's Name: Naz Hernandez  12:52 PM  10/1/2021    Reason for Consult:  HTN uncontrolled, Hypernatremia  Requesting Physician:  Kiya Rudolph MD      Chief Complaint:  Weakness       Interval History :    Hypernatremia : better    BP better controlled            History of Present Ilness:    Naz Hernandez is a 64 y.o. female with PMH of dementia, HTN     Presented with c/o weakness  Generalized weakness  Poor appetite    In the emergency room, BP was 171/115, pulse 114, respirations 16, temperature 98. 3. . WBC was 15. Jullie Likens x-ray showed small left lower lobe infiltrate. . Procalcitonin was elevated at 0.5.. Serum lactate was normal... UA showed no pyuria. Jeneal Genre was no clinical evidence of skin or soft tissue infection or serious infection.  No other localizing infections identified. Juan Luis Cason was noted to have a sodium of 152 and acutely elevated creatinine 1.5    Nephrology consult for management of hypernatremia and uncontrolled HTN     reports longstanding history of labile, uncontrolled hypertension managed by cardiology-and apppears had w/u for secondary HTN in the past. Medications limited by multiple drug allergies  Past Medical History:   Diagnosis Date    Abnormal LFT's     Arthritis     Atrial fibrillation [facility] [address] [facility]     COVID-19 61/90/3731    Follicular lymphoma ([facility] [address]     Dr Liza Goldman GERD (gastroesophageal reflux disease)     Glaucoma     Gout 11/02/2010    Hypertension     Liver disease     steatohepatitis    Migraine     Non-ulcer dyspepsia     egd neg 11/2011    Peripheral vascular disease ([facility] [address]     Thyroid disease        Past Surgical History:   Procedure Laterality Date    APPENDECTOMY      CATARACT REMOVAL      2007    CHOLECYSTECTOMY      2005    COLONOSCOPY  2/27/2015    polyp    ENDOSCOPY, COLON, DIAGNOSTIC  11/14/2011    bx stomach polyp    HIP SURGERY Right 06/30/2020    RIGHT PERCUTANEOUS HIP PINNING (Right Hip) w./ Dr Elmo Mcgee 6/30/20    HIP SURGERY Right 6/30/2020    RIGHT PERCUTANEOUS HIP PINNING performed by [facility] at [facility]      THYROIDECTOMY      76    TONSILLECTOMY      UPPER GASTROINTESTINAL ENDOSCOPY      UPPER GASTROINTESTINAL ENDOSCOPY  2/27/2015    gastric polyp       Family History   Problem Relation Age of Onset    Heart Disease Mother     Cancer Sister     Cancer Brother         reports that she has never smoked. She has never used smokeless tobacco. She reports that she does not drink alcohol and does not use drugs.     Allergies:  Latex, Ferric carboxymaltose, Aldactone [spironolactone], Baclofen, Cardizem [diltiazem hcl], Dilaudid [hydromorphone hcl], Droperidol, Guanfacine hcl, Hydrochlorothiazide, Ibuprofen, Lasix [furosemide], Lorazepam, Nexium [esomeprazole magnesium trihydrate], Nortriptyline, Other, Phenergan [promethazine hcl], Phenergan [promethazine hcl], Phenothiazines, Pilocarpine, Prochlorperazine edisylate, Reglan [metoclopramide hcl], Rofecoxib, Tramadol, Triavil [perphenazine-amitriptyline], Ultracet [tramadol-acetaminophen], Valium, Vioxx, Zofran odt [ondansetron], Amlodipine, Amoxicillin, Augmentin [amoxicillin-pot clavulanate], Avelox [moxifloxacin], Bactrim [sulfamethoxazole-trimethoprim], Biaxin [clarithromycin], Cephalexin, Clonidine derivatives, Codeine, Coreg [carvedilol], Demerol, Hydralazine, Levaquin [levofloxacin in d5w], Maxalt [rizatriptan benzoate], Morphine and related, Scopolamine, and Zonisamide    Current Medications:    labetalol (NORMODYNE;TRANDATE) injection 10 mg, Q4H PRN  metoprolol tartrate (LOPRESSOR) tablet 100 mg, BID  amLODIPine (NORVASC) tablet 10 mg, Daily  minoxidil (LONITEN) tablet 5 mg, BID  flecainide (TAMBOCOR) tablet 50 mg, BID  timolol (TIMOPTIC) 0.5 % ophthalmic solution 1 drop, BID  brimonidine (ALPHAGAN) 0.2 % ophthalmic solution 1 drop, BID  sodium chloride flush 0.9 % injection 5-40 mL, 2 times per day  sodium chloride flush 0.9 % injection 5-40 mL, PRN  0.9 % sodium chloride infusion, PRN  enoxaparin (LOVENOX) injection 30 mg, Daily  polyethylene glycol (GLYCOLAX) packet 17 g, Daily PRN  pantoprazole (PROTONIX) tablet 40 mg, QAM AC  ondansetron (ZOFRAN-ODT) disintegrating tablet 4 mg, Q8H PRN  meropenem (MERREM) 500 mg IVPB (mini-bag), Q12H        Review of Systems:   14 point ROS obtained but were negative except mentioned in HPI      Physical exam:     Vitals:  /70   Pulse 79   Temp 98.3 °F (36.8 °C) (Oral)   Resp 16   Ht 5' 3\" (1.6 m)   Wt 104 lb 11.5 oz (47.5 kg)   SpO2 95%   BMI 18.55 kg/m²   Constitutional:  OAA X2 NAD  Skin: no rash, turgor wnl  Heent:  eomi, mmm  Neck: no bruits or jvd noted  Cardiovascular:  S1, S2 without m/r/g  Respiratory: CTA B without w/r/r  Abdomen:  +bs, soft, nt, nd  Ext: no  lower extremity edema  Psychiatric: deferred   Musculoskeletal:  Rom, muscular strength intact    Data:   Labs:  CBC:   Recent Labs     09/29/21  0610 09/30/21  0550 10/01/21  0610   WBC 5.4 4.7 5.4   HGB 11.8* 11.9* 12.9    180 212     BMP:    Recent Labs     09/29/21  0610 10/01/21  0610   * 137   K 3.4* 3.9   * 102   CO2 27 27   BUN 22* 21*   CREATININE 0.9 0.8   GLUCOSE 137* 134*     Ca/Mg/Phos:   Recent Labs     09/29/21  0610 10/01/21  0610   CALCIUM 8.7 8.2*     Hepatic:   No results for input(s): AST, ALT, ALB, BILITOT, ALKPHOS in the last 72 hours. Troponin:   No results for input(s): TROPONINI in the last 72 hours.   BNP: No results for input(s): BNP in the last 72

## 2021-10-01 NOTE — PLAN OF CARE
Palliative Care Progress Note  Palliative Care Admit date:  9/29/21    Advance Directives:  Full code in place. Son, King Araiza, designated as pts healthcare proxy. Plan of care/goals:  Call placed to King Araiza for ACP discussion but n/a. Left message requesting return call today.     Social/Spiritual:    Plan:  Await return call from son        Reason for consult:    ___ Advance Care Planning  ___ Transition of Care Planning  ___ Psychosocial/Spiritual Support  ___ Symptom Management                                                                                                                                                                        Mary Beth Herrrea RN

## 2021-10-01 NOTE — PLAN OF CARE
Palliative Care Progress Note  Palliative Care Admit date:  9/29/21    Advance Directives:  Discussed the components of resuscitation w/ Lefty Fragoso and he understands the likely lack of meaningful benefit and potential fore causing pt more pain. He wants to discuss pts status w/ his dad before making a decision to amend code status but will call floor if they do wish to amend. Plan of care/goals:  Exhaustive d/w son/HCPOA, Lefty Fragoso. We reviewed pts current status and poor intake. Also made him aware that pt cannot return to [address] as per CM note. We reviewed the SLP note and the effort of needing to cue pt in taking po for their assessment. Lefty Fragoso shared that pt, in recent times, had expressed feeling tired and he feels she may be ready to pass on. That said, Lefty Fragoso wishes to speak w/ family about the option for comfort care. He doesn't intend to leave decision making open ended though he would like to see if pt can begin to show some improved intake over the weekend. If family wishes to involve hospice, they will contact the floor RN to apprise. If they don't make that decision over the weekend, he is agreeable to writer f/u on Monday. With little to no intake, Lefty Fragoso understands pt would have a limited life expectancy and, if she qualified, he would be agreeable to the [employer] (only ipu to take covid pts). Social/Spiritual:  Pt originally came from home where she lives w/ her spouse of 58 years. Pt spouse is blind according to Lefty Fragoso and he cannot resume care of pt at this time. They anticipate a need for long term placement. Plan: Lefty Fragoso states he will advance d/w family around the [address] for pt and whether or not to amend code status.          Reason for consult:    _X_ Advance Care Planning  _X_ Transition of Care Planning  _X_ Psychosocial/Spiritual Support  ___ Symptom Management                                                                                                                                                          Kelvin Rubi RN

## 2021-10-01 NOTE — PROGRESS NOTES
Physical Therapy/ Ocupational Therapy  Pt lethargic and demos not to be aroused. Pt RN made aware. Will continue to follow.   Jimmy Jefferson PTA

## 2021-10-01 NOTE — PLAN OF CARE
Problem: Nutrition  Goal: Optimal nutrition therapy  Outcome: Ongoing  Note: Nutrition Problem #1: Inadequate oral intake  Intervention: Food and/or Nutrient Delivery: Continue Current Diet, Continue Oral Nutrition Supplement  Nutritional Goals: Consume 50% or greater of 3 meals per day and ONS

## 2021-10-02 LAB
BASOPHILS ABSOLUTE: 0.1 K/UL (ref 0–0.2)
BASOPHILS RELATIVE PERCENT: 2.4 %
EOSINOPHILS ABSOLUTE: 0 K/UL (ref 0–0.6)
EOSINOPHILS RELATIVE PERCENT: 0.6 %
HCT VFR BLD CALC: 34.4 % (ref 36–48)
HEMOGLOBIN: 12 G/DL (ref 12–16)
LYMPHOCYTES ABSOLUTE: 1.6 K/UL (ref 1–5.1)
LYMPHOCYTES RELATIVE PERCENT: 27.9 %
MCH RBC QN AUTO: 30.5 PG (ref 26–34)
MCHC RBC AUTO-ENTMCNC: 34.7 G/DL (ref 31–36)
MCV RBC AUTO: 87.8 FL (ref 80–100)
MONOCYTES ABSOLUTE: 0.5 K/UL (ref 0–1.3)
MONOCYTES RELATIVE PERCENT: 8.4 %
NEUTROPHILS ABSOLUTE: 3.5 K/UL (ref 1.7–7.7)
NEUTROPHILS RELATIVE PERCENT: 60.7 %
PDW BLD-RTO: 13.9 % (ref 12.4–15.4)
PLATELET # BLD: 205 K/UL (ref 135–450)
PMV BLD AUTO: 9.8 FL (ref 5–10.5)
RBC # BLD: 3.92 M/UL (ref 4–5.2)
WBC # BLD: 5.8 K/UL (ref 4–11)

## 2021-10-02 PROCEDURE — 51798 US URINE CAPACITY MEASURE: CPT

## 2021-10-02 PROCEDURE — 85025 COMPLETE CBC W/AUTO DIFF WBC: CPT

## 2021-10-02 PROCEDURE — 6370000000 HC RX 637 (ALT 250 FOR IP): Performed by: HOSPITALIST

## 2021-10-02 PROCEDURE — 6360000002 HC RX W HCPCS: Performed by: HOSPITALIST

## 2021-10-02 PROCEDURE — 1200000000 HC SEMI PRIVATE

## 2021-10-02 PROCEDURE — 36591 DRAW BLOOD OFF VENOUS DEVICE: CPT

## 2021-10-02 PROCEDURE — 2580000003 HC RX 258: Performed by: HOSPITALIST

## 2021-10-02 PROCEDURE — 99232 SBSQ HOSP IP/OBS MODERATE 35: CPT | Performed by: HOSPITALIST

## 2021-10-02 RX ORDER — DEXTROSE AND SODIUM CHLORIDE 5; .45 G/100ML; G/100ML
INJECTION, SOLUTION INTRAVENOUS CONTINUOUS
Status: DISCONTINUED | OUTPATIENT
Start: 2021-10-02 | End: 2021-10-05

## 2021-10-02 RX ORDER — METOPROLOL SUCCINATE 25 MG/1
25 TABLET, EXTENDED RELEASE ORAL DAILY
Status: DISCONTINUED | OUTPATIENT
Start: 2021-10-03 | End: 2021-10-07 | Stop reason: HOSPADM

## 2021-10-02 RX ORDER — MINOXIDIL 2.5 MG/1
5 TABLET ORAL DAILY
Status: DISCONTINUED | OUTPATIENT
Start: 2021-10-03 | End: 2021-10-07 | Stop reason: HOSPADM

## 2021-10-02 RX ADMIN — Medication 10 ML: at 21:53

## 2021-10-02 RX ADMIN — MEROPENEM 500 MG: 500 INJECTION, POWDER, FOR SOLUTION INTRAVENOUS at 13:44

## 2021-10-02 RX ADMIN — BRIMONIDINE TARTRATE 1 DROP: 2 SOLUTION OPHTHALMIC at 21:52

## 2021-10-02 RX ADMIN — Medication 10 ML: at 10:22

## 2021-10-02 RX ADMIN — TIMOLOL MALEATE 1 DROP: 5 SOLUTION OPHTHALMIC at 21:52

## 2021-10-02 RX ADMIN — BRIMONIDINE TARTRATE 1 DROP: 2 SOLUTION OPHTHALMIC at 10:23

## 2021-10-02 RX ADMIN — MEROPENEM 500 MG: 500 INJECTION, POWDER, FOR SOLUTION INTRAVENOUS at 00:12

## 2021-10-02 RX ADMIN — TIMOLOL MALEATE 1 DROP: 5 SOLUTION OPHTHALMIC at 10:23

## 2021-10-02 RX ADMIN — SODIUM CHLORIDE 25 ML: 9 INJECTION, SOLUTION INTRAVENOUS at 13:41

## 2021-10-02 RX ADMIN — MINOXIDIL 5 MG: 2.5 TABLET ORAL at 10:24

## 2021-10-02 RX ADMIN — FLECAINIDE ACETATE 50 MG: 100 TABLET ORAL at 21:43

## 2021-10-02 RX ADMIN — DEXTROSE AND SODIUM CHLORIDE: 5; 450 INJECTION, SOLUTION INTRAVENOUS at 21:42

## 2021-10-02 RX ADMIN — DEXTROSE AND SODIUM CHLORIDE: 5; 450 INJECTION, SOLUTION INTRAVENOUS at 17:46

## 2021-10-02 RX ADMIN — METOPROLOL TARTRATE 100 MG: 50 TABLET, FILM COATED ORAL at 10:24

## 2021-10-02 RX ADMIN — FLECAINIDE ACETATE 50 MG: 100 TABLET ORAL at 10:24

## 2021-10-02 RX ADMIN — MEROPENEM 500 MG: 500 INJECTION, POWDER, FOR SOLUTION INTRAVENOUS at 23:58

## 2021-10-02 RX ADMIN — ENOXAPARIN SODIUM 30 MG: 30 INJECTION SUBCUTANEOUS at 10:22

## 2021-10-02 RX ADMIN — AMLODIPINE BESYLATE 10 MG: 5 TABLET ORAL at 10:24

## 2021-10-02 NOTE — PROGRESS NOTES
Office: [phone]       Fax: [phone]      Nephrology Daily Note        Patient's Name: Simon Robison  Date of Visit: 10/2/2021    Reason for Consult:  hypernatremia  Subjective:   Admit Date: 9/27/2021        INTERVAL HISTORY    Feels same  Shortness of breath: No   UOP: Fair  Creat: stable         DIET Adult Oral Nutrition Supplement; Standard High Calorie/High Protein Oral Supplement  Adult Oral Nutrition Supplement; Frozen Oral Supplement  ADULT DIET; Dysphagia - Minced and Moist  Medications:   Scheduled Meds:   metoprolol tartrate  100 mg Oral BID    amLODIPine  10 mg Oral Daily    minoxidil  5 mg Oral BID    flecainide  50 mg Oral BID    timolol  1 drop Both Eyes BID    brimonidine  1 drop Both Eyes BID    sodium chloride flush  5-40 mL IntraVENous 2 times per day    enoxaparin  30 mg SubCUTAneous Daily    pantoprazole  40 mg Oral QAM AC    meropenem  500 mg IntraVENous Q12H     Continuous Infusions:   dextrose 5 % and 0.45 % NaCl      sodium chloride 25 mL (10/02/21 1341)       Labs:  CBC:   Recent Labs     09/30/21  0550 10/01/21  0610 10/02/21  0630   WBC 4.7 5.4 5.8   HGB 11.9* 12.9 12.0    212 205     Ca/Mg/Phos:   Recent Labs     10/01/21  0610   CALCIUM 8.2*     UA:No results for input(s): Brain Douse, GLUCOSEU, BILIRUBINUR, [employer], BLOODU, PHUR, PROTEINU, UROBILINOGEN, NITRU, LEUKOCYTESUR, Sadia Keyanna in the last 72 hours. Urine Microscopic: No results for input(s): LABCAST, BACTERIA, COMU, HYALCAST, WBCUA, RBCUA, EPIU in the last 72 hours. Urine Chemistry: No results for input(s): Taj Gobble, PROTEINUR, NAUR in the last 72 hours.     Objective:   Vitals: BP (!) 93/56   Pulse 58   Temp 97.6 °F (36.4 °C) (Axillary)   Resp 18   Ht 5' 3\" (1.6 m)   Wt 104 lb 11.5 oz (47.5 kg)   SpO2 95%   BMI 18.55 kg/m²    Wt Readings from Last 3 Encounters:   09/30/21 104 lb 11.5 oz (47.5 kg)   09/21/21 101 lb 1.6 oz (45.9 kg)   10/02/20 110 lb (49.9 kg)      24HR INTAKE/OUTPUT:      Intake/Output Summary (Last 24 hours) at 10/2/2021 1606  Last data filed at 10/2/2021 1022  Gross per 24 hour   Intake 1 ml   Output 200 ml   Net -199 ml     Constitutional:   no apparent distress  NECK: supple, no JVD  Cardiovascular:  S1, S2 reg  Respiratory:  CTA bilaterally   Abdomen: +bs, soft, NT  Ext: no LE edema  CNS: no agitation    IMAGING:  XR ELBOW LEFT (2 VIEWS)   Final Result   No acute fracture, dislocation, or sign of joint effusion. XR WRIST LEFT (2 VIEWS)   Final Result   No acute fracture or dislocation at the left wrist.         XR CHEST PORTABLE   Final Result   Strandy opacity at the left base there are present subsegmental atelectasis   rather than infiltrate. The remaining lungs are stable. Assessment :     1. Hypernatremia    Baseline creat:<1      Recent Labs     10/01/21  0610   BUN 21*   CREATININE 0.8     Recent Labs     10/01/21  0610      K 3.9   CO2 27         2. HTN  -low    BP: (!) 93/56    3. pneumonia     4. MANDY resolved    Plan:     - Na better  - dec minoxidil  - dec metoprolol  - monitor BMP     -Monitor I/O, UOP  -Maintain MAP>65  -Avoid nephrotoxin, if able. -Dose meds to current eGFR    Thank you for allowing us to participate in care of Chapito Faust . We will continue to follow. Feel free to contact me with any questions.       Jayde Gallagher MD  10/2/2021    [facility] [address] [place] [address] S  Office : [phone]  Fax :[phone]

## 2021-10-02 NOTE — FLOWSHEET NOTE
Perfect [employer] message sent to hospitalist pager:     \"Pt having low urine output, maybe 50mls since 0600; 200ml last night. Doesn't seem to be retaining per bladder scan this a.m. Pt not taking good PO; only has intermittent IV abx with no cont. IVF. Palliative care following; family still deciding on code status. \"

## 2021-10-02 NOTE — PROGRESS NOTES
Output 200 ml   Net -199 ml       Physical Exam Performed:    /66   Pulse 77   Temp 98.3 °F (36.8 °C) (Axillary)   Resp 18   Ht 5' 3\" (1.6 m)   Wt 104 lb 11.5 oz (47.5 kg)   SpO2 94%   BMI 18.55 kg/m²     General appearance: No apparent distress, appears stated age and cooperative. HEENT: Pupils equal, round, and reactive to light. Conjunctivae/corneas clear. Neck: Supple, with full range of motion. No jugular venous distention. Trachea midline. Respiratory:  Normal respiratory effort. Clear to auscultation, bilaterally without Rales/Wheezes/Rhonchi. Cardiovascular: Regular rate and rhythm with normal S1/S2 without murmurs, rubs or gallops. Abdomen: Soft, non-tender, non-distended with normal bowel sounds. Musculoskeletal: No clubbing, cyanosis or edema bilaterally. Full range of motion without deformity. Skin: Skin color, texture, turgor normal.  No rashes or lesions. Neurologic:  Neurovascularly intact without any focal sensory/motor deficits. Cranial nerves: II-XII intact, grossly non-focal.  Psychiatric: Alert and oriented to self  Capillary Refill: Brisk,3 seconds, normal   Peripheral Pulses: +2 palpable, equal bilaterally       Labs:   Recent Labs     09/30/21  0550 10/01/21  0610 10/02/21  0630   WBC 4.7 5.4 5.8   HGB 11.9* 12.9 12.0   HCT 34.9* 37.5 34.4*    212 205     Recent Labs     10/01/21  0610      K 3.9      CO2 27   BUN 21*   CREATININE 0.8   CALCIUM 8.2*     No results for input(s): AST, ALT, BILIDIR, BILITOT, ALKPHOS in the last 72 hours. No results for input(s): INR in the last 72 hours. No results for input(s): Wayna Khat in the last 72 hours.     Urinalysis:      Lab Results   Component Value Date    NITRU Negative 09/27/2021    WBCUA 3-5 09/27/2021    BACTERIA Rare 09/27/2021    RBCUA 3-4 09/27/2021    BLOODU Negative 09/27/2021    SPECGRAV 1.015 09/27/2021    GLUCOSEU Negative 09/27/2021    GLUCOSEU NEGATIVE 02/03/2012       Radiology:  XR ELBOW LEFT (2 VIEWS)   Final Result   No acute fracture, dislocation, or sign of joint effusion. XR WRIST LEFT (2 VIEWS)   Final Result   No acute fracture or dislocation at the left wrist.         XR CHEST PORTABLE   Final Result   Strandy opacity at the left base there are present subsegmental atelectasis   rather than infiltrate. The remaining lungs are stable. Assessment/Plan:      -Hypernatremia with dehydration-sodium 151 on presentation. Improving. Improved with D5--patient continues to have poor oral intake--monitor BMP  -Acute kidney injuryprerenal in the setting of dehydration and ARB use. . Creatinine was elevated 1.5, baseline is about 0.9. --resolved with IV fluids. Continue to monitor  -Hypokalemiarepleted  -Sepsis likely due to pneumonia--patient with sinus tachycardia, leukocytosis. -Lactate is normal.-Received IV fluids 30 cc/kg in the ED-resolved with treatment  -Suspected left basilar bacterial pneumonia/HCAP. Lucy Farr Chest x-ray showed left basilar infiltrate. . Procalcitonin was elevated 0.5 with  leukocytosis and tachycardia. .. complete IV meropenem day 5/5 .discontinue vancomycin 9/29. Algis Broadcheryle Patient has multiple drug allergies across multiple classes. .    -COVID-19 infectionpresent on admission--- not requiring oxygen. Continue supportive care.   -Essential pretensionuncontrolled-better controlled on minoxidil, metoprolol continue amlodipine following adjustment of doses. Nephrology consult appreciated.  Medications limited by multiple drug allergies  -Advanced dementia-continue supportive care  -Paroxysmal A. Fib-sinus tach on presentation-not anticoagulated given history of GI bleed--continue flecainide and metoprolol  -Follicular lymphoma--follows with oncologynot yet required treatment   -Anorexia/poor oral intake related to advanced dementia, acute infection--continue to encourage oral intake-dietitian following, on supplements, ... poa not inclined to have a PEG at this time    -Hypokalemiarepleted    DVT Prophylaxis: Lovenox  Diet: Adult Oral Nutrition Supplement; Standard High Calorie/High Protein Oral Supplement  Adult Oral Nutrition Supplement; Frozen Oral Supplement  ADULT DIET; Dysphagia - Minced and Moist  Code Status: Full Code    Disposition. .. oral intake remains poor-family yet to decide on  nutrition support via NG/PEG vs comfort care/hospice-Palliative care following. .. Patient will be a high risk for readmission. ..  Discharge to SNF after nutrition issues addressed--Case management following    Rigo Armas MD

## 2021-10-02 NOTE — PROGRESS NOTES
Pt rubbing over lower abd area when asked if it hurt states Yes. After repositioned asked if she was ok . pt stated \"yes Honey I am fine\" took sips of water.

## 2021-10-03 PROBLEM — U07.1 COVID: Status: ACTIVE | Noted: 2021-10-03

## 2021-10-03 LAB
ANION GAP SERPL CALCULATED.3IONS-SCNC: 9 MMOL/L (ref 3–16)
BASOPHILS ABSOLUTE: 0 K/UL (ref 0–0.2)
BASOPHILS RELATIVE PERCENT: 0.9 %
BUN BLDV-MCNC: 18 MG/DL (ref 7–20)
C-REACTIVE PROTEIN: 12.5 MG/L (ref 0–5.1)
CALCIUM SERPL-MCNC: 8.1 MG/DL (ref 8.3–10.6)
CHLORIDE BLD-SCNC: 102 MMOL/L (ref 99–110)
CO2: 27 MMOL/L (ref 21–32)
CREAT SERPL-MCNC: 0.9 MG/DL (ref 0.6–1.2)
EOSINOPHILS ABSOLUTE: 0 K/UL (ref 0–0.6)
EOSINOPHILS RELATIVE PERCENT: 0.6 %
GFR AFRICAN AMERICAN: >60
GFR NON-AFRICAN AMERICAN: >60
GLUCOSE BLD-MCNC: 124 MG/DL (ref 70–99)
HCT VFR BLD CALC: 34.8 % (ref 36–48)
HEMOGLOBIN: 11.8 G/DL (ref 12–16)
LYMPHOCYTES ABSOLUTE: 1.1 K/UL (ref 1–5.1)
LYMPHOCYTES RELATIVE PERCENT: 21.2 %
MCH RBC QN AUTO: 30.5 PG (ref 26–34)
MCHC RBC AUTO-ENTMCNC: 34 G/DL (ref 31–36)
MCV RBC AUTO: 89.7 FL (ref 80–100)
MONOCYTES ABSOLUTE: 0.4 K/UL (ref 0–1.3)
MONOCYTES RELATIVE PERCENT: 7.5 %
NEUTROPHILS ABSOLUTE: 3.6 K/UL (ref 1.7–7.7)
NEUTROPHILS RELATIVE PERCENT: 69.8 %
PDW BLD-RTO: 13.5 % (ref 12.4–15.4)
PLATELET # BLD: 197 K/UL (ref 135–450)
PMV BLD AUTO: 9.7 FL (ref 5–10.5)
POTASSIUM SERPL-SCNC: 3.3 MMOL/L (ref 3.5–5.1)
RBC # BLD: 3.88 M/UL (ref 4–5.2)
SODIUM BLD-SCNC: 138 MMOL/L (ref 136–145)
WBC # BLD: 5.2 K/UL (ref 4–11)

## 2021-10-03 PROCEDURE — 99232 SBSQ HOSP IP/OBS MODERATE 35: CPT | Performed by: HOSPITALIST

## 2021-10-03 PROCEDURE — 86140 C-REACTIVE PROTEIN: CPT

## 2021-10-03 PROCEDURE — 85025 COMPLETE CBC W/AUTO DIFF WBC: CPT

## 2021-10-03 PROCEDURE — 6370000000 HC RX 637 (ALT 250 FOR IP): Performed by: HOSPITALIST

## 2021-10-03 PROCEDURE — 80048 BASIC METABOLIC PNL TOTAL CA: CPT

## 2021-10-03 PROCEDURE — 6360000002 HC RX W HCPCS: Performed by: HOSPITALIST

## 2021-10-03 PROCEDURE — 6370000000 HC RX 637 (ALT 250 FOR IP): Performed by: NURSE PRACTITIONER

## 2021-10-03 PROCEDURE — 1200000000 HC SEMI PRIVATE

## 2021-10-03 PROCEDURE — 36591 DRAW BLOOD OFF VENOUS DEVICE: CPT

## 2021-10-03 PROCEDURE — 2580000003 HC RX 258: Performed by: HOSPITALIST

## 2021-10-03 RX ORDER — POTASSIUM CHLORIDE 20MEQ/15ML
20 LIQUID (ML) ORAL DAILY
Status: DISCONTINUED | OUTPATIENT
Start: 2021-10-03 | End: 2021-10-05

## 2021-10-03 RX ADMIN — BRIMONIDINE TARTRATE 1 DROP: 2 SOLUTION OPHTHALMIC at 09:55

## 2021-10-03 RX ADMIN — BRIMONIDINE TARTRATE 1 DROP: 2 SOLUTION OPHTHALMIC at 22:26

## 2021-10-03 RX ADMIN — TIMOLOL MALEATE 1 DROP: 5 SOLUTION OPHTHALMIC at 22:26

## 2021-10-03 RX ADMIN — AMLODIPINE BESYLATE 10 MG: 5 TABLET ORAL at 09:54

## 2021-10-03 RX ADMIN — TIMOLOL MALEATE 1 DROP: 5 SOLUTION OPHTHALMIC at 09:55

## 2021-10-03 RX ADMIN — MINOXIDIL 5 MG: 2.5 TABLET ORAL at 09:54

## 2021-10-03 RX ADMIN — DEXTROSE AND SODIUM CHLORIDE: 5; 450 INJECTION, SOLUTION INTRAVENOUS at 10:02

## 2021-10-03 RX ADMIN — MEROPENEM 500 MG: 500 INJECTION, POWDER, FOR SOLUTION INTRAVENOUS at 12:31

## 2021-10-03 RX ADMIN — METOPROLOL SUCCINATE 25 MG: 25 TABLET, EXTENDED RELEASE ORAL at 09:54

## 2021-10-03 RX ADMIN — FLECAINIDE ACETATE 50 MG: 100 TABLET ORAL at 09:54

## 2021-10-03 RX ADMIN — MEROPENEM 500 MG: 500 INJECTION, POWDER, FOR SOLUTION INTRAVENOUS at 22:26

## 2021-10-03 RX ADMIN — FLECAINIDE ACETATE 50 MG: 100 TABLET ORAL at 22:26

## 2021-10-03 RX ADMIN — ENOXAPARIN SODIUM 30 MG: 30 INJECTION SUBCUTANEOUS at 09:54

## 2021-10-03 RX ADMIN — PANTOPRAZOLE SODIUM 40 MG: 40 TABLET, DELAYED RELEASE ORAL at 05:46

## 2021-10-03 RX ADMIN — Medication 20 MEQ: at 10:15

## 2021-10-03 ASSESSMENT — PAIN SCALES - GENERAL: PAINLEVEL_OUTOF10: 0

## 2021-10-03 NOTE — PROGRESS NOTES
Hospitalist Progress Note      PCP: Kiya Rudolph MD    Date of Admission: 9/27/2021    Chief Complaint: generalized weakness, poor appetite and elevated white blood cell count. .. Hospital Course: \" 64 y. o. female with dementia, PAF, hypertension was sent to the emergency room from her skilled nursing facility with generalized weakness, poor appetite and elevated white blood cell count. ..    Patient is a poor historian given her dementia. Philly Vera is obtained from ED staff and review of records . Jeneal Genre is no reported fever , shortness of breath, cough, abdominal pain, nausea, vomiting, diarrhea, headaches, dysuria, frequency or any other complaints except for fatigue. .  In the emergency room, BP was 171/115, pulse 114, respirations 16, temperature 98. 3. . WBC was 15. Jes Valverde x-ray showed small left lower lobe infiltrate. . Procalcitonin was elevated at 0.5.. Serum lactate was normal... UA showed no pyuria. Jeneal Genre was no clinical evidence of skin or soft tissue infection or serious infection.  No other localizing infections identified. Juan Luis Cason was noted to have a sodium of 152 and acutely elevated creatinine 1.5  Patient was given IV meropenem and vancomycin empirically and IV fluid bolus\"     Admitted by hospital ist     Found to be COVID +  9/29     Subjective: EMR and notes reviewed. Pt was awake and alert.  No complaints       Medications:  Reviewed    Infusion Medications    dextrose 5 % and 0.45 % NaCl 75 mL/hr at 10/02/21 2142    sodium chloride 25 mL (10/02/21 1341)     Scheduled Medications    minoxidil  5 mg Oral Daily    metoprolol succinate  25 mg Oral Daily    amLODIPine  10 mg Oral Daily    flecainide  50 mg Oral BID    timolol  1 drop Both Eyes BID    brimonidine  1 drop Both Eyes BID    sodium chloride flush  5-40 mL IntraVENous 2 times per day    enoxaparin  30 mg SubCUTAneous Daily    pantoprazole  40 mg Oral QAM AC    meropenem  500 mg IntraVENous Q12H     PRN Meds: labetalol, sodium chloride flush, sodium chloride, polyethylene glycol, ondansetron      Intake/Output Summary (Last 24 hours) at 10/3/2021 0850  Last data filed at 10/3/2021 0616  Gross per 24 hour   Intake 1105.48 ml   Output 750 ml   Net 355.48 ml       Physical Exam Performed:    /75   Pulse 71   Temp 98.2 °F (36.8 °C) (Oral)   Resp 20   Ht 5' 3\" (1.6 m)   Wt 104 lb 11.5 oz (47.5 kg)   SpO2 100%   BMI 18.55 kg/m²     General appearance: No apparent distress, appears stated age and cooperative. HEENT: Pupils equal, round, and reactive to light. Conjunctivae/corneas clear. Neck: Supple, with full range of motion. No jugular venous distention. Trachea midline. Respiratory:  Normal respiratory effort. Clear to auscultation, bilaterally without Rales/Wheezes/Rhonchi. Cardiovascular: Regular rate and rhythm with normal S1/S2 without murmurs, rubs or gallops. Abdomen: Soft, non-tender, non-distended with normal bowel sounds. Musculoskeletal: No clubbing, cyanosis or edema bilaterally. Full range of motion without deformity. Skin: Skin color, texture, turgor normal.  No rashes or lesions. Neurologic:  Neurovascularly intact without any focal sensory/motor deficits. Cranial nerves: II-XII intact, grossly non-focal.  Psychiatric: Alert and oriented, thought content appropriate, normal insight  Capillary Refill: Brisk,3 seconds, normal   Peripheral Pulses: +2 palpable, equal bilaterally       Labs:   Recent Labs     10/01/21  0610 10/02/21  0630 10/03/21  0548   WBC 5.4 5.8 5.2   HGB 12.9 12.0 11.8*   HCT 37.5 34.4* 34.8*    205 197     Recent Labs     10/01/21  0610 10/03/21  0548    138   K 3.9 3.3*    102   CO2 27 27   BUN 21* 18   CREATININE 0.8 0.9   CALCIUM 8.2* 8.1*     No results for input(s): AST, ALT, BILIDIR, BILITOT, ALKPHOS in the last 72 hours. No results for input(s): INR in the last 72 hours. No results for input(s): Gabriella Kras in the last 72 hours.     Urinalysis: Lab Results   Component Value Date    NITRU Negative 09/27/2021    WBCUA 3-5 09/27/2021    BACTERIA Rare 09/27/2021    RBCUA 3-4 09/27/2021    BLOODU Negative 09/27/2021    SPECGRAV 1.015 09/27/2021    GLUCOSEU Negative 09/27/2021    GLUCOSEU NEGATIVE 02/03/2012       Radiology:  XR ELBOW LEFT (2 VIEWS)   Final Result   No acute fracture, dislocation, or sign of joint effusion. XR WRIST LEFT (2 VIEWS)   Final Result   No acute fracture or dislocation at the left wrist.         XR CHEST PORTABLE   Final Result   Strandy opacity at the left base there are present subsegmental atelectasis   rather than infiltrate. The remaining lungs are stable. Assessment/Plan:    Active Hospital Problems    Diagnosis     Hypernatremia [E87.0]      Hypernatremia with dehydration-sodium 151 on presentation. Improving. Improved with D5--patient continues to have poor oral intake--monitor BMP  - 10/3 resolved       Acute kidney injuryprerenal in the setting of dehydration and ARB use. . Creatinine was elevated 1.5, baseline is about 0.9. --resolved with IV fluids. Continue to monitor    Hypokalemiarepleted    Sepsis likely due to pneumonia--patient with sinus tachycardia, leukocytosis. -Lactate is normal.-Received IV fluids 30 cc/kg in the ED-resolved with treatment    Suspected left basilar bacterial pneumonia/HCAP. Zondra Alvine x-ray showed left basilar infiltrate. . Procalcitonin was elevated 0.5 with  leukocytosis and tachycardia. .. complete IV meropenem day 5/5 .discontinue vancomycin 9/29. .Obey Peraza has multiple drug allergies across multiple classes. .      COVID-19 infectionpresent on admission--- not requiring oxygen. Continue supportive care.       Essential pretensionuncontrolled-better controlled on minoxidil, metoprolol continue amlodipine following adjustment of doses. Nephrology consult appreciated.  Medications limited by multiple drug allergies    Advanced dementia-continue supportive care    Paroxysmal A. Fib-sinus tach on presentation-not anticoagulated given history of GI bleed--continue flecainide and metoprolol    Follicular lymphoma--follows with oncologynot yet required treatment   -Anorexia/poor oral intake related to advanced dementia, acute infection--encourage oral intake, oral supplements ordered by nutritionist... poa not inclined to have a PEG at this time         DVT Prophylaxis: Lovenox   Diet: Adult Oral Nutrition Supplement; Standard High Calorie/High Protein Oral Supplement  Adult Oral Nutrition Supplement; Frozen Oral Supplement  ADULT DIET; Dysphagia - Minced and Moist  Code Status: Full Code    PT/OT Eval Status:     Dispo - Pending family decision.      Adolfo Rosa, APRN - CNP

## 2021-10-03 NOTE — PROGRESS NOTES
Office: [phone]       Fax: [phone]      Nephrology Daily Note        Patient's Name: Renato Lawrence  Date of Visit: 10/3/2021    Reason for Consult:  hypernatremia  Subjective:   Admit Date: 9/27/2021        INTERVAL HISTORY    Feels same  Shortness of breath: No   UOP: Fair  Creat: stable         DIET Adult Oral Nutrition Supplement; Standard High Calorie/High Protein Oral Supplement  Adult Oral Nutrition Supplement; Frozen Oral Supplement  ADULT DIET; Dysphagia - Minced and Moist  Medications:   Scheduled Meds:   minoxidil  5 mg Oral Daily    metoprolol succinate  25 mg Oral Daily    amLODIPine  10 mg Oral Daily    flecainide  50 mg Oral BID    timolol  1 drop Both Eyes BID    brimonidine  1 drop Both Eyes BID    sodium chloride flush  5-40 mL IntraVENous 2 times per day    enoxaparin  30 mg SubCUTAneous Daily    pantoprazole  40 mg Oral QAM AC    meropenem  500 mg IntraVENous Q12H     Continuous Infusions:   dextrose 5 % and 0.45 % NaCl 75 mL/hr at 10/02/21 2142    sodium chloride 25 mL (10/02/21 1341)       Labs:  CBC:   Recent Labs     10/01/21  0610 10/02/21  0630 10/03/21  0548   WBC 5.4 5.8 5.2   HGB 12.9 12.0 11.8*    205 197     Ca/Mg/Phos:   Recent Labs     10/01/21  0610 10/03/21  0548   CALCIUM 8.2* 8.1*     UA:No results for input(s): Chinyere Herder, Edythe Buttner, BLOODU, PHUR, PROTEINU, UROBILINOGEN, NITRU, LEUKOCYTESUR, Oletha Gash in the last 72 hours. Urine Microscopic: No results for input(s): LABCAST, BACTERIA, COMU, HYALCAST, WBCUA, RBCUA, EPIU in the last 72 hours. Urine Chemistry: No results for input(s): Sallyanne Ly, PROTEINUR, NAUR in the last 72 hours.     Objective:   Vitals: /75   Pulse 71   Temp 98.2 °F (36.8 °C) (Oral)   Resp 20   Ht 5' 3\" (1.6 m)   Wt 104 lb 11.5 oz (47.5 kg)   SpO2 100%   BMI 18.55 kg/m²    Wt Readings from Last 3 Encounters:   09/30/21 104 lb 11.5 oz (47.5 kg)   09/21/21 101 lb 1.6 oz (45.9 kg)   10/02/20 110 lb (49.9 kg)      24HR INTAKE/OUTPUT:      Intake/Output Summary (Last 24 hours) at 10/3/2021 0945  Last data filed at 10/3/2021 1921  Gross per 24 hour   Intake 1105.48 ml   Output 750 ml   Net 355.48 ml     Constitutional:   no apparent distress  NECK: supple, no JVD  Cardiovascular:  S1, S2 reg  Respiratory:  CTA bilaterally   Abdomen: +bs, soft, NT  Ext: no LE edema  CNS: no agitation    IMAGING:  XR ELBOW LEFT (2 VIEWS)   Final Result   No acute fracture, dislocation, or sign of joint effusion. XR WRIST LEFT (2 VIEWS)   Final Result   No acute fracture or dislocation at the left wrist.         XR CHEST PORTABLE   Final Result   Strandy opacity at the left base there are present subsegmental atelectasis   rather than infiltrate. The remaining lungs are stable. Assessment :     1. Hypernatremia    Baseline creat:<1      Recent Labs     10/01/21  0610 10/03/21  0548   BUN 21* 18   CREATININE 0.8 0.9     Recent Labs     10/01/21  0610 10/03/21  0548    138   K 3.9 3.3*   CO2 27 27         2. HTN  -low    BP: 134/75    3. pneumonia     4. MANDY resolved    Plan:     - Na better  -  minoxidil decreased   - metoprolol decreased   - replace K  - monitor BMP     -Monitor I/O, UOP  -Maintain MAP>65  -Avoid nephrotoxin, if able. -Dose meds to current eGFR    Thank you for allowing us to participate in care of David Jc . We will continue to follow. Feel free to contact me with any questions.       Ines Pinzon MD  10/3/2021    [facility]  [address]  Office : [phone]  Fax :[phone]

## 2021-10-04 LAB
ANION GAP SERPL CALCULATED.3IONS-SCNC: 9 MMOL/L (ref 3–16)
BASOPHILS ABSOLUTE: 0 K/UL (ref 0–0.2)
BASOPHILS RELATIVE PERCENT: 0.8 %
BUN BLDV-MCNC: 13 MG/DL (ref 7–20)
CALCIUM SERPL-MCNC: 7.9 MG/DL (ref 8.3–10.6)
CHLORIDE BLD-SCNC: 102 MMOL/L (ref 99–110)
CO2: 26 MMOL/L (ref 21–32)
CREAT SERPL-MCNC: 0.8 MG/DL (ref 0.6–1.2)
EOSINOPHILS ABSOLUTE: 0 K/UL (ref 0–0.6)
EOSINOPHILS RELATIVE PERCENT: 0.2 %
GFR AFRICAN AMERICAN: >60
GFR NON-AFRICAN AMERICAN: >60
GLUCOSE BLD-MCNC: 122 MG/DL (ref 70–99)
HCT VFR BLD CALC: 34.9 % (ref 36–48)
HEMOGLOBIN: 11.9 G/DL (ref 12–16)
LYMPHOCYTES ABSOLUTE: 1.3 K/UL (ref 1–5.1)
LYMPHOCYTES RELATIVE PERCENT: 25.6 %
MCH RBC QN AUTO: 30.4 PG (ref 26–34)
MCHC RBC AUTO-ENTMCNC: 34.2 G/DL (ref 31–36)
MCV RBC AUTO: 88.7 FL (ref 80–100)
MONOCYTES ABSOLUTE: 0.4 K/UL (ref 0–1.3)
MONOCYTES RELATIVE PERCENT: 8 %
NEUTROPHILS ABSOLUTE: 3.2 K/UL (ref 1.7–7.7)
NEUTROPHILS RELATIVE PERCENT: 65.4 %
PDW BLD-RTO: 13.9 % (ref 12.4–15.4)
PLATELET # BLD: 183 K/UL (ref 135–450)
PMV BLD AUTO: 9.4 FL (ref 5–10.5)
POTASSIUM SERPL-SCNC: 3.4 MMOL/L (ref 3.5–5.1)
RBC # BLD: 3.93 M/UL (ref 4–5.2)
SODIUM BLD-SCNC: 137 MMOL/L (ref 136–145)
WBC # BLD: 4.9 K/UL (ref 4–11)

## 2021-10-04 PROCEDURE — 1200000000 HC SEMI PRIVATE

## 2021-10-04 PROCEDURE — 80048 BASIC METABOLIC PNL TOTAL CA: CPT

## 2021-10-04 PROCEDURE — 97530 THERAPEUTIC ACTIVITIES: CPT

## 2021-10-04 PROCEDURE — 6370000000 HC RX 637 (ALT 250 FOR IP): Performed by: HOSPITALIST

## 2021-10-04 PROCEDURE — 6370000000 HC RX 637 (ALT 250 FOR IP): Performed by: NURSE PRACTITIONER

## 2021-10-04 PROCEDURE — 99232 SBSQ HOSP IP/OBS MODERATE 35: CPT | Performed by: HOSPITALIST

## 2021-10-04 PROCEDURE — 97535 SELF CARE MNGMENT TRAINING: CPT

## 2021-10-04 PROCEDURE — 85025 COMPLETE CBC W/AUTO DIFF WBC: CPT

## 2021-10-04 PROCEDURE — 6360000002 HC RX W HCPCS: Performed by: HOSPITALIST

## 2021-10-04 PROCEDURE — 2580000003 HC RX 258: Performed by: HOSPITALIST

## 2021-10-04 PROCEDURE — 97110 THERAPEUTIC EXERCISES: CPT

## 2021-10-04 RX ORDER — FLECAINIDE ACETATE 50 MG/1
50 TABLET ORAL 2 TIMES DAILY
Status: DISCONTINUED | OUTPATIENT
Start: 2021-10-04 | End: 2021-10-07 | Stop reason: HOSPADM

## 2021-10-04 RX ADMIN — TIMOLOL MALEATE 1 DROP: 5 SOLUTION OPHTHALMIC at 08:28

## 2021-10-04 RX ADMIN — MINOXIDIL 5 MG: 2.5 TABLET ORAL at 08:29

## 2021-10-04 RX ADMIN — Medication 20 MEQ: at 08:28

## 2021-10-04 RX ADMIN — AMLODIPINE BESYLATE 10 MG: 5 TABLET ORAL at 08:29

## 2021-10-04 RX ADMIN — METOPROLOL SUCCINATE 25 MG: 25 TABLET, EXTENDED RELEASE ORAL at 08:29

## 2021-10-04 RX ADMIN — FLECAINIDE ACETATE 50 MG: 100 TABLET ORAL at 11:31

## 2021-10-04 RX ADMIN — ENOXAPARIN SODIUM 30 MG: 30 INJECTION SUBCUTANEOUS at 08:28

## 2021-10-04 RX ADMIN — DEXTROSE AND SODIUM CHLORIDE: 5; 450 INJECTION, SOLUTION INTRAVENOUS at 02:46

## 2021-10-04 RX ADMIN — TIMOLOL MALEATE 1 DROP: 5 SOLUTION OPHTHALMIC at 20:44

## 2021-10-04 RX ADMIN — BRIMONIDINE TARTRATE 1 DROP: 2 SOLUTION OPHTHALMIC at 20:44

## 2021-10-04 RX ADMIN — DEXTROSE AND SODIUM CHLORIDE: 5; 450 INJECTION, SOLUTION INTRAVENOUS at 18:25

## 2021-10-04 RX ADMIN — MEROPENEM 500 MG: 500 INJECTION, POWDER, FOR SOLUTION INTRAVENOUS at 11:32

## 2021-10-04 RX ADMIN — BRIMONIDINE TARTRATE 1 DROP: 2 SOLUTION OPHTHALMIC at 08:28

## 2021-10-04 RX ADMIN — FLECAINIDE ACETATE 50 MG: 100 TABLET ORAL at 20:41

## 2021-10-04 ASSESSMENT — PAIN SCALES - GENERAL
PAINLEVEL_OUTOF10: 0

## 2021-10-04 NOTE — PLAN OF CARE
Palliative Care Progress Note  Palliative Care Admit date:  9/29/21    Advance Directives:  Full code currently    Plan of care/goals:  Call to [employer] to advance discussion re: code wishes and [address]. Had to leave message requesting return call. Plan:   Await return call from [facility] @ [phone]:   Spoke w/ spouse who states that he, indeed, had d/w [employer] but wasn't able to fully understand Gabby Crocker was trying to tell me. \"   Writer reinforced the options for comfort care w/ hospice vs SNF but that pts improvement was complicated by her lack of po intake. Spouse able to understand that, w/o consistent intake, pt is unlikely to benefit from therapy. He has also been [facility] for SN[facility] days but not long-term or long term care days in an Critical access hospital. He is hopeful that pt would qualify for [facility] stay as he is aware that [address] would pay that room and board. Spouse intends to d/w [employer] further this evening and writer will continue to make contact w/ [employer] today and tomorrow. Spouse also shared that family discussed dnr/dni but writer needed to provide clarification for him. He is not ready to amend code status today but \"thinks\" they will after further discussion.         Reason for consult:    _X_ Advance Care Planning  ___ Transition of Care Planning  ___ Psychosocial/Spiritual Support  ___ Symptom Management                                                                                                                                                                                  Mary Beth Herrera RN

## 2021-10-04 NOTE — PROGRESS NOTES
with poor PO intake    Acute kidney injuryprerenal in the setting of dehydration and ARB use. . Creatinine was elevated 1.5, baseline is about 0.9. --resolved with IV fluids. Continue to monitor    Hypokalemiarepleted    Sepsis likely due to pneumonia--patient with sinus tachycardia, leukocytosis. -Lactate is normal.-Received IV fluids 30 cc/kg in the ED-resolved with treatment    Suspected left basilar bacterial pneumonia/HCAP. Piyush Randall Chest x-ray showed left basilar infiltrate. completed IV meropenem day 5/5 .discontinue vancomycin 9/29.     COVID-19 infectionpresent on admission--- not requiring oxygen. Continue supportive care.     Essential pretensionuncontrolled-better controlled on minoxidil, metoprolol continue amlodipine following adjustment of doses. Nephrology consult appreciated. Medications limited by multiple drug allergies    Advanced dementia-continue supportive care    Paroxysmal A. Fib-sinus tach on presentation-not anticoagulated given history of GI bleed--continue flecainide and metoprolol    Follicular lymphoma--follows with oncologynot yet required treatment  -Anorexia/poor oral intake related to advanced dementia, acute infection--encourage oral intake, oral supplements ordered by nutritionist.  Rashaad Hale not inclined to have a PEG at this time    GOC: updated family 10/04 at [address]. Ubaldo Armendariz agrees with hospice support at discharge. Code is limited.       DVT Prophylaxis: Lovenox   Diet: Adult Oral Nutrition Supplement; Standard High Calorie/High Protein Oral Supplement  Adult Oral Nutrition Supplement; Frozen Oral Supplement  ADULT DIET;  Dysphagia - Minced and Moist  Code Status: Limited    PT/OT Eval Status:     Dispo -medically ready 10/05 pending hospice plan    Irma Bernard MD

## 2021-10-04 NOTE — CARE COORDINATION
LISA notes pt was a re-admit form [facility] skilled. Pt not able to return back to [place] as pt diagnosed now with COVID-19. Spoke to CATALINA WEST/RN on this day and updated that she had long conversation with spouse on the phone in regards to [address] and pt not eating. CATALINA WEST/RN will reach out to janeth Qureshi as well today.  Updated MD. [facility]-Mary Kendall RN

## 2021-10-04 NOTE — PLAN OF CARE
Palliative Care Progress Note  Palliative Care Admit date:  9/29/21    Advance Directives:   F/u d/w Emily Delgado today and he states, after d/w family, they wish to amend the code status to reflect DNR/DNI. We have amended to a 'limited' code given the current plan of care is unchanged. If pts resp status were to worsen, currently family would want all efforts at assisting, improving pts breathing up to the point of high flow or even vapo/bipap, but they do not want intubation/MV support. Plan of care/goals: We again reviewed the options for pt, as well as, her very poor intake. Emily Delgado would like to have [facility] pt for their [facility] tomorrow. If pt qualifies for the IPU, family would want pt transferred to [facility]. If she does not qualify for the IPU, family may consider a SNF that accepts covid+ pts.       Plan:  Writer will make ref to hospice and ask HOC to eval tomorrow        Reason for consult:  _X_ Advance Care Planning  _X_ Transition of Care Planning  _X_ Psychosocial/Spiritual Support  ___ Symptom Management                                                                                                                                                            Julienne Cheney RN

## 2021-10-04 NOTE — PLAN OF CARE
Pt educated on the need to turn every 2 hours to prevent any skin integrity breakdown. Pt verbalizes understanding but requires reinforcement due to confusion. Pt compliant with q2 hour turns. See documentation.

## 2021-10-04 NOTE — PROGRESS NOTES
Office: [phone]       Fax: [phone]      Nephrology Daily Note        Patient's Name: Newt Halsted  Date of Visit: 10/4/2021    Reason for Consult:  hypernatremia  Subjective:   Admit Date: 9/27/2021        INTERVAL HISTORY      BP stable  Feels same  Shortness of breath: No   UOP: Fair  Creat: stable         DIET Adult Oral Nutrition Supplement; Standard High Calorie/High Protein Oral Supplement  Adult Oral Nutrition Supplement; Frozen Oral Supplement  ADULT DIET; Dysphagia - Minced and Moist  Medications:   Scheduled Meds:   flecainide  50 mg Oral BID    potassium chloride  20 mEq Oral Daily    minoxidil  5 mg Oral Daily    metoprolol succinate  25 mg Oral Daily    amLODIPine  10 mg Oral Daily    timolol  1 drop Both Eyes BID    brimonidine  1 drop Both Eyes BID    sodium chloride flush  5-40 mL IntraVENous 2 times per day    enoxaparin  30 mg SubCUTAneous Daily    pantoprazole  40 mg Oral QAM AC    meropenem  500 mg IntraVENous Q12H     Continuous Infusions:   dextrose 5 % and 0.45 % NaCl 75 mL/hr at 10/04/21 0246    sodium chloride 25 mL (10/02/21 1341)       Labs:  CBC:   Recent Labs     10/02/21  0630 10/03/21  0548 10/04/21  0520   WBC 5.8 5.2 4.9   HGB 12.0 11.8* 11.9*    197 183     Ca/Mg/Phos:   Recent Labs     10/03/21  0548 10/04/21  0520   CALCIUM 8.1* 7.9*     UA:No results for input(s): Elnita Fransisco, Delene Dura, BLOODU, PHUR, PROTEINU, UROBILINOGEN, NITRU, LEUKOCYTESUR, Aditi [place] in the last 72 hours. Urine Microscopic: No results for input(s): LABCAST, BACTERIA, COMU, HYALCAST, WBCUA, RBCUA, EPIU in the last 72 hours. Urine Chemistry: No results for input(s): Valene Lombardi, PROTEINUR, NAUR in the last 72 hours.     Objective:   Vitals: BP (!) 127/52   Pulse 70   Temp 98.3 °F (36.8 °C) (Oral)   Resp 18   Ht 5' 3\" (1.6 m)   Wt 104 lb 11.5 oz (47.5 kg)   SpO2 95%   BMI 18.55 kg/m²    Wt Readings from Last 3 Encounters:   09/30/21 104 lb 11.5 oz (47.5 kg)   09/21/21 101 lb 1.6 oz (45.9 kg)   10/02/20 110 lb (49.9 kg)      24HR INTAKE/OUTPUT:      Intake/Output Summary (Last 24 hours) at 10/4/2021 1204  Last data filed at 10/3/2021 2322  Gross per 24 hour   Intake    Output 700 ml   Net -700 ml     Constitutional:   no apparent distress  NECK: supple, no JVD  Cardiovascular:  S1, S2 reg  Respiratory:  CTA bilaterally   Abdomen: +bs, soft, NT  Ext: no LE edema  CNS: no agitation    IMAGING:  XR ELBOW LEFT (2 VIEWS)   Final Result   No acute fracture, dislocation, or sign of joint effusion. XR WRIST LEFT (2 VIEWS)   Final Result   No acute fracture or dislocation at the left wrist.         XR CHEST PORTABLE   Final Result   Strandy opacity at the left base there are present subsegmental atelectasis   rather than infiltrate. The remaining lungs are stable. Assessment :     1. Hypernatremia    Baseline creat:<1      Recent Labs     10/03/21  0548 10/04/21  0520   BUN 18 13   CREATININE 0.9 0.8     Recent Labs     10/03/21  0548 10/04/21  0520    137   K 3.3* 3.4*   CO2 27 26         2. HTN  -low    BP: (!) 127/52    3. pneumonia     4. MANDY resolved    Plan:     - Na better  - dec minoxidil dose  - dec metoprolol  - monitor BMP     -Monitor I/O, UOP  -Maintain MAP>65  -Avoid nephrotoxin, if able. -Dose meds to current eGFR    Thank you for allowing us to participate in care of Naz Hernandez . We will continue to follow. Feel free to contact me with any questions.       Rafael Ford MD  10/4/2021    [facility]  [address]  Office : [phone]  Fax :[phone]

## 2021-10-04 NOTE — PROGRESS NOTES
Occupational Therapy  Facility/Department: [place] [address] - MED SURG/ORTHO  Daily Treatment Note  NAME: Simon Robison  : 1945  MRN: [id number]    Date of Service: 10/4/2021    Discharge Recommendations:  Subacute/Skilled Nursing Facility       Assessment   Performance deficits / Impairments: Decreased functional mobility ; Decreased balance;Decreased ADL status; Decreased cognition;Decreased coordination;Decreased strength  Assessment: Pt more alert than documented for previous session, able to tolerate ~ 15 mins EOB with min-max A. Pt with decreased insight into deficits and situation, requiring max A for feeding and requiring increased time for all processing. Pt attention quickly fades, but responds to name throughout session. Co-tx collaboration this date with PT staff to safely progress pt toward goals. Pt will have better occupational performance outcomes within a co-treatment than 1:1 session. Will continue to follow per POC. Prognosis: Poor  OT Education: OT Role;ADL Adaptive Strategies  Barriers to Learning: cognition, pt demos no evidence of learning, would benefit from continued education  REQUIRES OT FOLLOW UP: Yes  Activity Tolerance  Activity Tolerance: Patient limited by fatigue;Treatment limited secondary to decreased cognition  Activity Tolerance: Vitals: BP= 155/80, HR= 81, SPO2= 95%  Safety Devices  Safety Devices in place: Yes  Type of devices: Call light within reach;Nurse notified; Left in bed;Bed alarm in place;Gait belt         Patient Diagnosis(es): The primary encounter diagnosis was Septicemia ([address] Diagnoses of Hypernatremia and MANDY (acute kidney injury) ([address] were also pertinent to this visit.       has a past medical history of Abnormal LFT's, Arthritis, Atrial fibrillation ([address] YPSDQ-83, Follicular lymphoma ([address] GERD (gastroesophageal reflux disease), Glaucoma, Gout, Hypertension, Liver disease, Migraine, Non-ulcer dyspepsia, Peripheral vascular disease ([address] and Thyroid commands  Attention Span: Attends with cues to redirect; Unable to maintain attention  Memory: Decreased long term memory;Decreased short term memory;Decreased recall of biographical Information;Decreased recall of recent events  Safety Judgement: Decreased awareness of need for safety;Decreased awareness of need for assistance  Initiation: Requires cues for all  Sequencing: Requires cues for all      Plan   Plan  Times per week: 2-3x  Current Treatment Recommendations: Balance Training, Functional Mobility Training, Safety Education & Training, Positioning, Self-Care / ADL    AM-PAC Score  AM-PAC Inpatient Daily Activity Raw Score: 7 (10/04/21 1123)  AM-PAC Inpatient ADL T-Scale Score : 20.13 (10/04/21 1123)  ADL Inpatient CMS 0-100% Score: 92.44 (10/04/21 1123)  ADL Inpatient CMS G-Code Modifier : CM (10/04/21 1123)    Goals  Short term goals  Time Frame for Short term goals: 1 week trial(10-05-21)  Short term goal 1: mod assist of 2 with functional/toilet transfers with MOI BARRAGAN by 10-03-21--GOAL NOT MET, pt not appropriate for safe trial of transfers 10/4/21  Short term goal 2: min assist with self feeding while up in chair by 10-05-21-- ongoing, max A for feeding at EOB 10/4/21  Patient Goals   Patient goals : unable to verbalize       Therapy Time   Individual Concurrent Group Co-treatment   Time In 0920         Time Out 0958         Minutes [address] [facility] Center[facility]SEL[facility] Ervin/FABIOLA

## 2021-10-04 NOTE — PROGRESS NOTES
Epimenio Bolt MD Friddie Lombard, Nephrology. [address] [facility] or Facility: [facility] From: Tommy Christian RE: Liam Monterroso 1945 RM: 360     Do you want to continue with IV fluids?     Need Callback: NEEDS CALLBACK C5 MED SURG / Jeremi Mcbride

## 2021-10-04 NOTE — PROGRESS NOTES
Physical Therapy  Facility/Department: [facility] C5 - MED SURG/ORTHO  Daily Treatment Note  NAME: Lino Santo  : 1945  MRN: [id number]    Date of Service: 10/4/2021    Discharge Recommendations:  Subacute/Skilled Nursing Facility   PT Equipment Recommendations  Equipment Needed: No  Other: defer to next level of care    Assessment   Body structures, Functions, Activity limitations: Decreased functional mobility ; Decreased balance;Decreased posture;Decreased strength;Decreased ROM; Decreased safe awareness;Decreased cognition;Decreased endurance  Assessment: Pt is a 64 y.o. female admitted to [facility] secondary to spesis and PNA. Pt is unable to provide hx, per chart she lives with her  in one level home with ramped entrance. Pt typically requires assistance for mobility, (unsure BRIAN or mobility at baseline). Pt is noted to have B PF contractures (L>R), anticipate pt not likely ambulatory PTA. Pt is currently functioning below her expected baseline requiring TD x 2 for bed mobility and partial STS from EOB to promote WB through BLE. Pt is limited by cognition, decreased command following and fatigue. Pt will benefit from continued skilled PT in acute care setting to address above deficits. Recommend SNF at d/c. Treatment Diagnosis: Impaired functional mobility and balance  Specific instructions for Next Treatment: Progress mobility as tolerated  Prognosis: Guarded  Decision Making: Medium Complexity  PT Education: Goals; General Safety;PT Role;Plan of Care; Functional Mobility Training  Patient Education: role of PT and benefit of OOB mobility, pt demonstrates no evidence of learning  Barriers to Learning: Cognition, poor command following  Activity Tolerance  Activity Tolerance: Patient limited by fatigue;Patient limited by endurance; Patient limited by cognitive status  Activity Tolerance: Vitals: BP= 155/80, HR= 81, SPO2= 95%     Patient Diagnosis(es): The primary encounter diagnosis was Septicemia ([facility] Diagnoses of Hypernatremia and MANDY (acute kidney injury) ([facility] were also pertinent to this visit. has a past medical history of Abnormal LFT's, Arthritis, Atrial fibrillation ([facility] ZGCAQ-05, Follicular lymphoma ([facility] GERD (gastroesophageal reflux disease), Glaucoma, Gout, Hypertension, Liver disease, Migraine, Non-ulcer dyspepsia, Peripheral vascular disease ([facility] and Thyroid disease. has a past surgical history that includes Thyroidectomy; Hysterectomy; Appendectomy; Tonsillectomy; Cholecystectomy; Cataract removal; Upper gastrointestinal endoscopy; lymph node biopsy; Endoscopy, colon, diagnostic (11/14/2011); Colonoscopy (2/27/2015); Upper gastrointestinal endoscopy (2/27/2015); hip surgery (Right, 06/30/2020); and hip surgery (Right, 6/30/2020). Restrictions  Restrictions/Precautions  Restrictions/Precautions: Fall Risk, Isolation  Position Activity Restriction  Other position/activity restrictions: Telemetry, up with assist, L chest port, COVID+     Subjective   General  Chart Reviewed: Yes  Additional Pertinent Hx: Hx: dementia, PAF, hypertension  Response To Previous Treatment: Patient with no complaints from previous session.   Family / Caregiver Present: No  Referring Practitioner: Ivette Eddy MD  Subjective  Subjective: Pt supine in bed on approach, minimal vocalizations at first but not resistant to PT  General Comment  Comments: RN cleared pt for session  Pain Screening  Patient Currently in Pain: Denies  Vital Signs  Patient Currently in Pain: Denies       Orientation  Orientation  Overall Orientation Status: Impaired     Objective   Bed mobility  Supine to Sit: Dependent/Total;2 Person assistance;Maximum assistance  Sit to Supine: Dependent/Total;Maximum assistance  Transfers  Sit to Stand: Unable to assess (did not attempt standing, pt feet contracted into PF)           Exercises  Knee Long Arc Quad: X 7 BLE, pt offering AROM X 1 rep only despite numerous verbal and tactile cues  Ankle Pumps: Pt ankles stretched 1 min X 3 BLE  Comments: Pt sat at EOB ~ 20 minutes with mod/ Kirill for posterior balance during ex and feeding attempts        AM-PAC Score     AM-PAC Inpatient Mobility without Stair Climbing Raw Score : 7 (10/04/21 1300)  AM-PAC Inpatient without Stair Climbing T-Scale Score : 28.66 (10/04/21 1300)  Mobility Inpatient CMS 0-100% Score: 86.29 (10/04/21 1300)  Mobility Inpatient without Stair CMS G-Code Modifier : CM (10/04/21 1300)       Goals  Short term goals  Time Frame for Short term goals: 1 week 10/05/21 (unless otherwise specified)  Short term goal 1: Pt completes supine to/from sit with modA x 2; 10/4 max/ dep  A of 2  Short term goal 2: 10/03: Pt will sit EOB >15 minutes with SBA without LOB to improve activity tolerance; 10/4 EOB 20 min mod/Kirill for posterior balance  Short term goal 3: As appropriate/safe pt will participate in t/f bed <> chair with maxA x 2 and appropriate lift equipment; 10/4 EMMA  Patient Goals   Patient goals : pt is unable to state    Plan    Plan  Times per week: 2-3x/wk  Times per day: Daily  Specific instructions for Next Treatment: Progress mobility as tolerated  Current Treatment Recommendations: Strengthening, Neuromuscular Re-education, Safety Education & Training, Balance Training, Endurance Training, Patient/Caregiver Education & Training, Functional Mobility Training, Manual Therapy - Soft Tissue Mobilization, Transfer Training, Positioning  Safety Devices  Type of devices:  All fall risk precautions in place, Call light within reach, Bed alarm in place, Left in bed, Nurse notified     Therapy Time   Individual Concurrent Group Co-treatment   Time In 0920         Time Out 0958         Minutes [address], [place] [address]

## 2021-10-05 LAB
ANION GAP SERPL CALCULATED.3IONS-SCNC: 7 MMOL/L (ref 3–16)
BUN BLDV-MCNC: 9 MG/DL (ref 7–20)
CALCIUM SERPL-MCNC: 7.8 MG/DL (ref 8.3–10.6)
CHLORIDE BLD-SCNC: 101 MMOL/L (ref 99–110)
CO2: 27 MMOL/L (ref 21–32)
CREAT SERPL-MCNC: 0.7 MG/DL (ref 0.6–1.2)
GFR AFRICAN AMERICAN: >60
GFR NON-AFRICAN AMERICAN: >60
GLUCOSE BLD-MCNC: 134 MG/DL (ref 70–99)
POTASSIUM SERPL-SCNC: 3.2 MMOL/L (ref 3.5–5.1)
SODIUM BLD-SCNC: 135 MMOL/L (ref 136–145)

## 2021-10-05 PROCEDURE — 1200000000 HC SEMI PRIVATE

## 2021-10-05 PROCEDURE — 99232 SBSQ HOSP IP/OBS MODERATE 35: CPT | Performed by: HOSPITALIST

## 2021-10-05 PROCEDURE — 6370000000 HC RX 637 (ALT 250 FOR IP): Performed by: HOSPITALIST

## 2021-10-05 PROCEDURE — 2580000003 HC RX 258: Performed by: HOSPITALIST

## 2021-10-05 PROCEDURE — 80048 BASIC METABOLIC PNL TOTAL CA: CPT

## 2021-10-05 PROCEDURE — 6360000002 HC RX W HCPCS: Performed by: HOSPITALIST

## 2021-10-05 PROCEDURE — 6370000000 HC RX 637 (ALT 250 FOR IP): Performed by: STUDENT IN AN ORGANIZED HEALTH CARE EDUCATION/TRAINING PROGRAM

## 2021-10-05 RX ORDER — POTASSIUM CHLORIDE 20MEQ/15ML
40 LIQUID (ML) ORAL DAILY
Status: DISCONTINUED | OUTPATIENT
Start: 2021-10-05 | End: 2021-10-07 | Stop reason: HOSPADM

## 2021-10-05 RX ADMIN — ENOXAPARIN SODIUM 30 MG: 30 INJECTION SUBCUTANEOUS at 09:58

## 2021-10-05 RX ADMIN — MEROPENEM 500 MG: 500 INJECTION, POWDER, FOR SOLUTION INTRAVENOUS at 13:42

## 2021-10-05 RX ADMIN — FLECAINIDE ACETATE 50 MG: 100 TABLET ORAL at 19:57

## 2021-10-05 RX ADMIN — FLECAINIDE ACETATE 50 MG: 100 TABLET ORAL at 09:58

## 2021-10-05 RX ADMIN — Medication 40 MEQ: at 10:13

## 2021-10-05 RX ADMIN — BRIMONIDINE TARTRATE 1 DROP: 2 SOLUTION OPHTHALMIC at 09:59

## 2021-10-05 RX ADMIN — METOPROLOL SUCCINATE 25 MG: 25 TABLET, EXTENDED RELEASE ORAL at 09:58

## 2021-10-05 RX ADMIN — MINOXIDIL 5 MG: 2.5 TABLET ORAL at 09:58

## 2021-10-05 RX ADMIN — PANTOPRAZOLE SODIUM 40 MG: 40 TABLET, DELAYED RELEASE ORAL at 05:26

## 2021-10-05 RX ADMIN — TIMOLOL MALEATE 1 DROP: 5 SOLUTION OPHTHALMIC at 19:57

## 2021-10-05 RX ADMIN — BRIMONIDINE TARTRATE 1 DROP: 2 SOLUTION OPHTHALMIC at 19:57

## 2021-10-05 RX ADMIN — AMLODIPINE BESYLATE 10 MG: 5 TABLET ORAL at 09:58

## 2021-10-05 RX ADMIN — TIMOLOL MALEATE 1 DROP: 5 SOLUTION OPHTHALMIC at 09:59

## 2021-10-05 RX ADMIN — Medication 10 ML: at 20:12

## 2021-10-05 RX ADMIN — DEXTROSE AND SODIUM CHLORIDE: 5; 450 INJECTION, SOLUTION INTRAVENOUS at 05:26

## 2021-10-05 RX ADMIN — MEROPENEM 500 MG: 500 INJECTION, POWDER, FOR SOLUTION INTRAVENOUS at 00:24

## 2021-10-05 ASSESSMENT — PAIN SCALES - GENERAL
PAINLEVEL_OUTOF10: 0

## 2021-10-05 NOTE — PROGRESS NOTES
NUTRITION THERAPY ASSESSMENT    Due to hospice status, patient will be followed at low nutrition risk. Dietitian will sign off. If nutrition intervention is required, please submit a dietary consult.     Electronically signed by Burgess Elisa MS, RD, LD on 10/5/21 at 12:48 PM EDT    Contact: Office: [phone]; [address]: [address]

## 2021-10-05 NOTE — PROGRESS NOTES
[facility]    Met with patient and family to discuss hospice philosophy and services. They are agreeable to hospice and plan is: LTC at facility with [address] Patient will need placement. She is not having any symptoms currently that will qualify her for inpatient level of care. Discussed with family and they have been notified that LTC will be out of pocket and not covered by her insurance. Message left with LISA Hernandez. [facility] will continue to follow and will admit patient once disposition in place. Family would [place] [id number] so her  can visit. Thank you for the opportunity to serve this patient and family. Toshia Arguelles RN, [address], [address] [place] [address]   O: [phone]  C: [phone]  F: [phone]   Main & Referrals: [phone]   [email or web address]

## 2021-10-05 NOTE — CARE COORDINATION
CM updated by Dr. Hortencia Ramirez that he did speak with family prior day and we are now moving forward with hospice. Parisa Cunningham PC/RN spoke to family as well. Writer notes hospice referral placed. Spoke to Damon Brizuela with [address] and she will reach out to family today and get back to writer.  CM following-Mary Figueroa RN

## 2021-10-05 NOTE — PROGRESS NOTES
Office: [phone]       Fax: [phone]      Nephrology Daily Note        Patient's Name: Simon Robison  Date of Visit: 10/5/2021    Reason for Consult:  hypernatremia  Subjective:   Admit Date: 9/27/2021        INTERVAL HISTORY    Na borderline low    BP trending up  Feels same  Shortness of breath: No   UOP: Fair  Creat: stable         DIET Adult Oral Nutrition Supplement; Standard High Calorie/High Protein Oral Supplement  Adult Oral Nutrition Supplement; Frozen Oral Supplement  ADULT DIET; Dysphagia - Minced and Moist  Medications:   Scheduled Meds:   potassium chloride  40 mEq Oral Daily    flecainide  50 mg Oral BID    minoxidil  5 mg Oral Daily    metoprolol succinate  25 mg Oral Daily    amLODIPine  10 mg Oral Daily    timolol  1 drop Both Eyes BID    brimonidine  1 drop Both Eyes BID    sodium chloride flush  5-40 mL IntraVENous 2 times per day    enoxaparin  30 mg SubCUTAneous Daily    pantoprazole  40 mg Oral QAM AC    meropenem  500 mg IntraVENous Q12H     Continuous Infusions:   dextrose 5 % and 0.45 % NaCl 75 mL/hr at 10/05/21 0526    sodium chloride 25 mL (10/02/21 1341)       Labs:  CBC:   Recent Labs     10/03/21  0548 10/04/21  0520   WBC 5.2 4.9   HGB 11.8* 11.9*    183     Ca/Mg/Phos:   Recent Labs     10/03/21  0548 10/04/21  0520 10/05/21  0539   CALCIUM 8.1* 7.9* 7.8*     UA:No results for input(s): Brain Douse, Colan De Santiago, BLOODU, PHUR, PROTEINU, UROBILINOGEN, NITRU, LEUKOCYTESUR, Sadia Keyanna in the last 72 hours. Urine Microscopic: No results for input(s): LABCAST, BACTERIA, COMU, HYALCAST, WBCUA, RBCUA, EPIU in the last 72 hours. Urine Chemistry: No results for input(s): [place], PROTEINUR, NAUR in the last 72 hours.     Objective:   Vitals: BP (!) 172/68   Pulse 83   Temp 99.7 °F (37.6 °C) (Oral)   Resp 16   Ht 5' 3\" (1.6 m)   Wt 104 lb 11.5 oz (47.5 kg)   SpO2 93%   BMI 18.55 kg/m²    Wt Readings from Last 3 Encounters:   09/30/21 104 lb 11.5 oz (47.5 kg)   09/21/21 101 lb 1.6 oz (45.9 kg)   10/02/20 110 lb (49.9 kg)      24HR INTAKE/OUTPUT:      Intake/Output Summary (Last 24 hours) at 10/5/2021 1304  Last data filed at 10/5/2021 0526  Gross per 24 hour   Intake 832.82 ml   Output 1300 ml   Net -467.18 ml     Constitutional:   no apparent distress  NECK: supple, no JVD  Cardiovascular:  S1, S2 reg  Respiratory:  CTA bilaterally   Abdomen: +bs, soft, NT  Ext: no LE edema  CNS: no agitation    IMAGING:  XR ELBOW LEFT (2 VIEWS)   Final Result   No acute fracture, dislocation, or sign of joint effusion. XR WRIST LEFT (2 VIEWS)   Final Result   No acute fracture or dislocation at the left wrist.         XR CHEST PORTABLE   Final Result   Strandy opacity at the left base there are present subsegmental atelectasis   rather than infiltrate. The remaining lungs are stable. Assessment :     1. Hypernatremia    Baseline creat:<1      Recent Labs     10/03/21  0548 10/04/21  0520 10/05/21  0539   BUN 18 13 9   CREATININE 0.9 0.8 0.7     Recent Labs     10/03/21  0548 10/04/21  0520 10/05/21  0539    137 135*   K 3.3* 3.4* 3.2*   CO2 27 26 27         2. HTN  -low    BP: (!) 172/68    3. pneumonia     4. MANDY resolved    Plan:     - moniter Na level  - dec minoxidil dose  - dec metoprolol  - monitor BMP     -Monitor I/O, UOP  -Maintain MAP>65  -Avoid nephrotoxin, if able. -Dose meds to current eGFR        Thank you for allowing us to participate in care of Ant Reina . We will continue to follow. Feel free to contact me with any questions.       Alessandra Ibrahim MD  10/5/2021    [facility] [address] [place] [address] S  Office : [phone]  Fax :[phone]

## 2021-10-05 NOTE — PROGRESS NOTES
Hospitalist Progress Note      PCP: Melissa Norwood MD    Date of Admission: 9/27/2021    Chief Complaint: generalized weakness, poor appetite and elevated white blood cell count. Hospital Course: reviewed H&P      Subjective: difficult to assess. Pt does not interact much. Not eating as food is at bedside. Medications:  Reviewed    Infusion Medications    sodium chloride 25 mL (10/02/21 1341)     Scheduled Medications    potassium chloride  40 mEq Oral Daily    flecainide  50 mg Oral BID    minoxidil  5 mg Oral Daily    metoprolol succinate  25 mg Oral Daily    amLODIPine  10 mg Oral Daily    timolol  1 drop Both Eyes BID    brimonidine  1 drop Both Eyes BID    sodium chloride flush  5-40 mL IntraVENous 2 times per day    pantoprazole  40 mg Oral QAM AC     PRN Meds: sodium chloride flush, sodium chloride, polyethylene glycol, ondansetron      Intake/Output Summary (Last 24 hours) at 10/5/2021 1735  Last data filed at 10/5/2021 1530  Gross per 24 hour   Intake 247.82 ml   Output 700 ml   Net -452.18 ml       Physical Exam Performed:    /69   Pulse 72   Temp 98.5 °F (36.9 °C) (Oral)   Resp 16   Ht 5' 3\" (1.6 m)   Wt 104 lb 11.5 oz (47.5 kg)   SpO2 92%   BMI 18.55 kg/m²     General appearance: No apparent distress, appears stated age and cooperative. Respiratory:  Normal respiratory effort. Cardiovascular: Regular rate and rhythm with normal S1/S2 without murmurs, rubs or gallops. Abdomen: Soft, non-tender, non-distended with normal bowel sounds. Musculoskeletal: No clubbing, cyanosis or edema bilaterally. Full range of motion without deformity.   Capillary Refill: Brisk,3 seconds, normal   Peripheral Pulses: +2 palpable, equal bilaterally       Labs:   Recent Labs     10/03/21  0548 10/04/21  0520   WBC 5.2 4.9   HGB 11.8* 11.9*   HCT 34.8* 34.9*    183     Recent Labs     10/03/21  0548 10/04/21  0520 10/05/21  0539    137 135*   K 3.3* 3.4* 3.2*    102 101 CO2 27 26 27   BUN 18 13 9   CREATININE 0.9 0.8 0.7   CALCIUM 8.1* 7.9* 7.8*     No results for input(s): AST, ALT, BILIDIR, BILITOT, ALKPHOS in the last 72 hours. No results for input(s): INR in the last 72 hours. No results for input(s): Chio Risk in the last 72 hours. Urinalysis:      Lab Results   Component Value Date    NITRU Negative 09/27/2021    WBCUA 3-5 09/27/2021    BACTERIA Rare 09/27/2021    RBCUA 3-4 09/27/2021    BLOODU Negative 09/27/2021    SPECGRAV 1.015 09/27/2021    GLUCOSEU Negative 09/27/2021    GLUCOSEU NEGATIVE 02/03/2012       Radiology:  XR ELBOW LEFT (2 VIEWS)   Final Result   No acute fracture, dislocation, or sign of joint effusion. XR WRIST LEFT (2 VIEWS)   Final Result   No acute fracture or dislocation at the left wrist.         XR CHEST PORTABLE   Final Result   Strandy opacity at the left base there are present subsegmental atelectasis   rather than infiltrate. The remaining lungs are stable. Assessment/Plan:    Active Hospital Problems    Diagnosis     COVID [U07.1]     Hypernatremia [E87.0]        Family has elected to transition to hospice services. No labs will be checked further. Awaiting placement. Hypernatremia with dehydration-sodium 151 on presentation. resolved. Still with poor PO intake. Stopped IVF. Acute kidney injuryprerenal in the setting of dehydration and ARB use. Creatinine was elevated 1.5, baseline is about 0.9. --resolved with IV fluids. Hypokalemiarepleted with PO. Sepsis likely due to pneumonia--patient with sinus tachycardia, leukocytosis. -Lactate is normal.-Received IV fluids 30 cc/kg in the ED-resolved with treatment    Suspected left basilar bacterial pneumonia/HCAP. Chest x-ray showed left basilar infiltrate. completed IV meropenem day 5/5 .discontinue vancomycin 9/29.     COVID-19 infectionpresent on admission--- not requiring oxygen.   Continue supportive care.     Essential pretensionuncontrolled-better controlled on minoxidil, metoprolol continue amlodipine following adjustment of doses. Nephrology consult appreciated. Advanced dementia-continue supportive care    Paroxysmal A. Fib-sinus tach on presentation-not anticoagulated given history of GI bleed--continue flecainide and metoprolol    Follicular lymphoma--follows with oncologynot yet required treatment    -Anorexia/poor oral intake related to advanced dementia, acute infection--encourage oral intake, oral supplements ordered by nutritionist.      Stas Vela: updated family 10/05 at [phone]. Micheal Braga agrees with hospice support at discharge. Code is limited.       DVT Prophylaxis: none due to hospice measures  Diet: Adult Oral Nutrition Supplement; Standard High Calorie/High Protein Oral Supplement  Adult Oral Nutrition Supplement; Frozen Oral Supplement  ADULT DIET;  Dysphagia - Minced and Moist  Code Status: Limited    Dispo -medically ready 10/06 pending hospice plan    Bull Shane MD

## 2021-10-06 PROCEDURE — 2580000003 HC RX 258: Performed by: HOSPITALIST

## 2021-10-06 PROCEDURE — 6370000000 HC RX 637 (ALT 250 FOR IP): Performed by: HOSPITALIST

## 2021-10-06 PROCEDURE — 1200000000 HC SEMI PRIVATE

## 2021-10-06 RX ADMIN — FLECAINIDE ACETATE 50 MG: 100 TABLET ORAL at 21:48

## 2021-10-06 RX ADMIN — MINOXIDIL 5 MG: 2.5 TABLET ORAL at 09:48

## 2021-10-06 RX ADMIN — Medication 10 ML: at 09:48

## 2021-10-06 RX ADMIN — TIMOLOL MALEATE 1 DROP: 5 SOLUTION OPHTHALMIC at 09:48

## 2021-10-06 RX ADMIN — METOPROLOL SUCCINATE 25 MG: 25 TABLET, EXTENDED RELEASE ORAL at 09:48

## 2021-10-06 RX ADMIN — BRIMONIDINE TARTRATE 1 DROP: 2 SOLUTION OPHTHALMIC at 09:48

## 2021-10-06 RX ADMIN — Medication 10 ML: at 21:48

## 2021-10-06 RX ADMIN — FLECAINIDE ACETATE 50 MG: 100 TABLET ORAL at 09:48

## 2021-10-06 RX ADMIN — BRIMONIDINE TARTRATE 1 DROP: 2 SOLUTION OPHTHALMIC at 21:53

## 2021-10-06 RX ADMIN — TIMOLOL MALEATE 1 DROP: 5 SOLUTION OPHTHALMIC at 21:53

## 2021-10-06 RX ADMIN — AMLODIPINE BESYLATE 10 MG: 5 TABLET ORAL at 09:48

## 2021-10-06 RX ADMIN — PANTOPRAZOLE SODIUM 40 MG: 40 TABLET, DELAYED RELEASE ORAL at 05:22

## 2021-10-06 ASSESSMENT — PAIN SCALES - WONG BAKER: WONGBAKER_NUMERICALRESPONSE: 0

## 2021-10-06 ASSESSMENT — PAIN SCALES - GENERAL
PAINLEVEL_OUTOF10: 0
PAINLEVEL_OUTOF10: 0

## 2021-10-06 NOTE — CARE COORDINATION
CM notes Duncan Sterling met with family prior day. Pt currently does not qualify for IPU and family would like LTC placement with hospice following. Family wanting placement close to [facility] in [facility]. Writer LVM with son Saint Anthony Carline requesting facility choices. Pt is COVID + and is 7 days out of diagnoses. LISA English, RN       ADDENDUM 1212: Spoke to son Saint Anthony Carline on the phone and updated that dad did reach out to [address] [place] [address] prior. Writer spoke to Valentines with [facility] and updated that they take COVID + pts. However they would not be able to accept pt until day 11 of diagnoses. LVM with Jamil ORTEGA/SONNY. Lindsay Rivas, RN      ADDENDUM 7862: spoke to Valentines with [facility] and they can accept pt LTC with [facility]. Not able to accept pt until Sunday or Monday. Spoke to Jamil Ramey with [address] and she has arranged a respite stay for [facility] for 5 days. Jamil ORTEGA/RN will reach out to Valentines with [facility] and janeth Crowley as well to confirm DCP.   CM following-Mary Figueroa, RN

## 2021-10-06 NOTE — PROGRESS NOTES
CO2 26 27   BUN 13 9   CREATININE 0.8 0.7   CALCIUM 7.9* 7.8*     No results for input(s): AST, ALT, BILIDIR, BILITOT, ALKPHOS in the last 72 hours. No results for input(s): INR in the last 72 hours. No results for input(s): Ronel Height in the last 72 hours. Urinalysis:      Lab Results   Component Value Date    NITRU Negative 09/27/2021    WBCUA 3-5 09/27/2021    BACTERIA Rare 09/27/2021    RBCUA 3-4 09/27/2021    BLOODU Negative 09/27/2021    SPECGRAV 1.015 09/27/2021    GLUCOSEU Negative 09/27/2021    GLUCOSEU NEGATIVE 02/03/2012       Radiology:  XR ELBOW LEFT (2 VIEWS)   Final Result   No acute fracture, dislocation, or sign of joint effusion. XR WRIST LEFT (2 VIEWS)   Final Result   No acute fracture or dislocation at the left wrist.         XR CHEST PORTABLE   Final Result   Strandy opacity at the left base there are present subsegmental atelectasis   rather than infiltrate. The remaining lungs are stable. Assessment/Plan:    Active Hospital Problems    Diagnosis     COVID [U07.1]     Hypernatremia [E87.0]        Family has elected to transition to hospice services. No labs will be checked further. Off tele. Vital check per shift only. Awaiting placement. Hypernatremia with dehydration-sodium 151 on presentation. resolved. Still with poor PO intake. Stopped IVF. Acute kidney injuryprerenal in the setting of dehydration and ARB use. Creatinine was elevated 1.5, baseline is about 0.9. --resolved with IV fluids. Hypokalemiarepleted with PO. Sepsis likely due to pneumonia--patient with sinus tachycardia, leukocytosis. -Lactate is normal.-Received IV fluids 30 cc/kg in the ED-resolved with treatment    Suspected left basilar bacterial pneumonia/HCAP. Chest x-ray showed left basilar infiltrate. completed IV meropenem day 5/5 .discontinue vancomycin 9/29.     COVID-19 infectionpresent on admission--- not requiring oxygen.   Continue supportive care.     Essential pretensionuncontrolled-better controlled on minoxidil, metoprolol continue amlodipine following adjustment of doses. Nephrology consult appreciated. Advanced dementia-continue supportive care    Paroxysmal A. Fib-sinus tach on presentation-not anticoagulated given history of GI bleed--continue flecainide and metoprolol    Follicular lymphoma--follows with oncologynot yet required treatment    -Anorexia/poor oral intake related to advanced dementia, acute infection--encourage oral intake, oral supplements ordered by nutritionist.      [facility] family 10/05 at [phone]. Emily Delgado agrees with hospice support at discharge. Code is limited.       DVT Prophylaxis: none due to hospice measures  Diet: Adult Oral Nutrition Supplement; Standard High Calorie/High Protein Oral Supplement  Adult Oral Nutrition Supplement; Frozen Oral Supplement  ADULT DIET;  Dysphagia - Minced and Moist  Code Status: Limited    Dispo -medically ready 10/06 pending hospice plan    Opal Harris MD

## 2021-10-06 NOTE — PROGRESS NOTES
Occupational Therapy/Physcial Therapy  Per Case Management note on 10/5/21, pt is to transfer to Hospice care. OT/PT will sign off.   Amilcar Pack OT  Bertha Jackson PT

## 2021-10-06 NOTE — PROGRESS NOTES
Notified by Severiano Rein that patient can be accepted at [facility] on Sunday/Monday (10 days after COVID positive test). Talked to Mary at [facility] who confirmed this. She said they have available bed and will accept. She said she has a call out to patient's son Gonsalo Crowley to discuss financial/admission process. After discussion with [address] [employer] manager and , we will accept patient as respite until she can be transported with [facility] on Sunday/Monday. Family must be agreeable to this plan and understand that patient cannot stay at [facility] long term. Message left with son Gonsalo Crowley to see if family is agreeable. Awaiting his return call. Addendum 410 pm:  Call back from [place] Carline who is agreeable to this plan. He was given Eugenia's phone number and will reach out to her as he has not talked to anyone from [facility] yet regarding paperwork. He will follow up with [facility] once he gets things settled. In the meantime, HOC will email him consents to get signed. [employer] set up for tomorrow at noon. LISA Hernandez updated and Perfect Serve to Dr. Mikayla Bell to update. Jordan Alan RN, [address], [address] [place] [address]   O: [phone]  C: [phone]  F: [phone]   Main & Referrals: [phone]   [email or web address]

## 2021-10-07 VITALS
DIASTOLIC BLOOD PRESSURE: 73 MMHG | HEART RATE: 76 BPM | HEIGHT: 63 IN | BODY MASS INDEX: 18.55 KG/M2 | TEMPERATURE: 97.7 F | OXYGEN SATURATION: 92 % | RESPIRATION RATE: 16 BRPM | WEIGHT: 104.72 LBS | SYSTOLIC BLOOD PRESSURE: 132 MMHG

## 2021-10-07 PROCEDURE — 6370000000 HC RX 637 (ALT 250 FOR IP): Performed by: HOSPITALIST

## 2021-10-07 PROCEDURE — 6370000000 HC RX 637 (ALT 250 FOR IP): Performed by: NURSE PRACTITIONER

## 2021-10-07 RX ORDER — ACETAMINOPHEN 650 MG/1
650 SUPPOSITORY RECTAL EVERY 6 HOURS PRN
Status: DISCONTINUED | OUTPATIENT
Start: 2021-10-07 | End: 2021-10-07 | Stop reason: HOSPADM

## 2021-10-07 RX ORDER — AMLODIPINE BESYLATE 10 MG/1
10 TABLET ORAL DAILY
Qty: 30 TABLET | Refills: 3
Start: 2021-10-07

## 2021-10-07 RX ORDER — POLYETHYLENE GLYCOL 3350 17 G/17G
17 POWDER, FOR SOLUTION ORAL DAILY PRN
Qty: 527 G | Refills: 1
Start: 2021-10-07 | End: 2021-11-06

## 2021-10-07 RX ORDER — MINOXIDIL 2.5 MG/1
5 TABLET ORAL DAILY
Qty: 30 TABLET | Refills: 3
Start: 2021-10-07

## 2021-10-07 RX ADMIN — PANTOPRAZOLE SODIUM 40 MG: 40 TABLET, DELAYED RELEASE ORAL at 06:01

## 2021-10-07 RX ADMIN — ACETAMINOPHEN 650 MG: 650 SUPPOSITORY RECTAL at 01:40

## 2021-10-07 ASSESSMENT — PAIN SCALES - PAIN ASSESSMENT IN ADVANCED DEMENTIA (PAINAD)
TOTALSCORE: 0
BREATHING: 0
BODYLANGUAGE: 0
NEGVOCALIZATION: 0
FACIALEXPRESSION: 0
CONSOLABILITY: 0

## 2021-10-07 ASSESSMENT — PAIN SCALES - GENERAL: PAINLEVEL_OUTOF10: 0

## 2021-10-07 ASSESSMENT — PAIN SCALES - WONG BAKER: WONGBAKER_NUMERICALRESPONSE: 0

## 2021-10-07 NOTE — PLAN OF CARE
Bed in lowest position, wheels locked, 2/4 side rails up, nonskid footwear on. Bed/ chair check alarm in place, call light within reach. Pt instructed to call out when needing assistance. Pt stated understanding. Nurse will continue to monitor.         Problem: Falls - Risk of:  Goal: Will remain free from falls  Description: Will remain free from falls  10/7/2021 1050 by Douglas Aguilera RN  Outcome: Ongoing  10/7/2021 0325 by Devonte Reilly RN  Outcome: Ongoing  Goal: Absence of physical injury  Description: Absence of physical injury  10/7/2021 1050 by Douglas Aguilera RN  Outcome: Ongoing  10/7/2021 0325 by Devonte Reilly RN  Outcome: Ongoing

## 2021-10-07 NOTE — PROGRESS NOTES
[facility] consents received overnight from son/NAUN Arrington. Confirmed that he has talked to [facility] ALEJO FRY at the [facility] and has completed paperwork and placed deposit. Transport previously set up for today at noon with [employer]. Updated CM Trang Leung and staff nurse. Thanks      Rita Murphy RN, [address], [address] [place] [address]   O: [phone]  C: [phone]  F: [phone]   Main & Referrals: [phone]   [email or web address]

## 2021-10-07 NOTE — CARE COORDINATION
Case Management Discharge Summary- Hospice Discharge    Destination:   Hospice of [address] [place] [address] for 5 days for respite. Pt will then transition to [address] [place] [address] with [facility]. Hospice Agency name and contact: [address] [place] [address] arrangements are completed by the Hospice nurse. [facility] DNR signed and placed in discharge packet AND patient's hard chart. (This is required for DNR patients.)    Signed ECOC/AVS faxed to above agency/facility. Transportation arrangements per [employer]. Transport agency name and  time: [employer] ([employer])    Transport form completed and signed by:    Transport form placed with discharge packet:     Notified Family: sameera arranged with son Hailey Rocha. Patient's RN notified of plan: Sarah Reynolds    Note:    Discharging nurse to complete nursing portion of ECOC, reconcile AVS, place final copy with patient's discharge packet. RN to ensure signed prescriptions are sent home with patient or the facility as per nursing protocol.

## 2021-10-07 NOTE — DISCHARGE INSTR - COC
R19.7    Partial small bowel obstruction (HCC) K56.600    GERD (gastroesophageal reflux disease) K21.9    Thyroid disease E07.9    [facility] obstruction [facility] K56.609    Chest pain R07.9    HTN (hypertension), malignant I10    Hypertensive left ventricular hypertrophy, without heart failure P58.1    Diastolic dysfunction N97.67    Closed right hip fracture, initial encounter ([facility] S72.001A    Dementia without behavioral disturbance (HCC) F03.90    MANDY (acute kidney injury) ([facility] N17.9    UTI (urinary tract infection) N39.0    Altered mental status, unspecified R41.82    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, 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    Hypernatremia E87.0    COVID U07.1       Isolation/Infection:   Isolation            Droplet Plus          Patient Infection Status       Infection Onset Added Last Indicated Last Indicated By Review Planned Expiration Resolved Resolved By    COVID-19 09/29/21 09/29/21 09/29/21 COVID-19 10/06/21 10/13/21      Resolved    COVID-19 Rule Out 09/29/21 09/29/21 09/29/21 COVID-19 (Ordered)   09/29/21 Rule-Out Test Resulted    COVID-19 Rule Out 09/27/21 09/27/21 09/27/21 COVID-19 (Ordered)   09/29/21 Rule-Out Test Resulted    COVID-19 Rule Out 09/20/21 09/20/21 09/21/21 COVID-19 & Influenza Combo (Ordered)   09/21/21 Rule-Out Test Resulted    COVID-19 Rule Out 07/06/20 07/06/20 07/06/20 COVID-19 (Ordered)   07/06/20 Rule-Out Test Resulted    COVID-19 Rule Out 06/30/20 06/30/20 06/30/20 COVID-19 (Ordered)   06/30/20 Rule-Out Test Resulted            Nurse Assessment:  Last Vital Signs: /66   Pulse 76   Temp 98 °F (36.7 °C) (Oral)   Resp 20   Ht 5' 3\" (1.6 m)   Wt 104 lb 11.5 oz (47.5 kg)   SpO2 96%   BMI 18.55 kg/m²     Last documented pain score (0-10 scale): Pain Level: 0  Last Weight:   Wt Readings from Last 1 Encounters:   09/30/21 104 lb 11.5 oz (47.5 kg)     Mental Status:  disoriented and alert    IV Access:  - None    Nursing Mobility/ADLs:  Walking   Dependent  Transfer  Dependent  Bathing  Dependent  Dressing  Dependent  Toileting  Dependent  Feeding  Dependent  Med Admin  Dependent  Med Delivery   crushed    Wound Care Documentation and Therapy:        Elimination:  Continence:   · Bowel: No  · Bladder: No  Urinary Catheter: None   Colostomy/Ileostomy/Ileal Conduit: No       Date of Last BM: 10/4/21    Intake/Output Summary (Last 24 hours) at 10/7/2021 0737  Last data filed at 10/7/2021 0602  Gross per 24 hour   Intake 150 ml   Output 1000 ml   Net -850 ml     I/O last 3 completed shifts: In: 150 [P.O.:150]  Out: 1000 [Urine:1000]    Safety Concerns: At Risk for Falls    Impairments/Disabilities:      None    Nutrition Therapy:  Current Nutrition Therapy:   - Oral Diet:  General    Routes of Feeding: Oral  Liquids: Thin Liquids  Daily Fluid Restriction: no  Last Modified Barium Swallow with Video (Video Swallowing Test): not done    Treatments at the Time of Hospital Discharge:   Respiratory Treatments: ***  Oxygen Therapy:  is not on home oxygen therapy.   Ventilator:    - No ventilator support    Rehab Therapies: None  Weight Bearing Status/Restrictions: No weight bearing restirctions  Other Medical Equipment (for information only, NOT a DME order):  ***  Other Treatments: ***    Patient's personal belongings (please select all that are sent with patient):  ***    RN SIGNATURE:  Electronically signed by Irwin Ford RN on 10/7/21 at 10:55 AM EDT    CASE MANAGEMENT/SOCIAL WORK SECTION    Inpatient Status Date: ***    Readmission Risk Assessment Score:  Readmission Risk              Risk of Unplanned Readmission:  17           Discharging to Facility/ Agency   · Name:   · Address:  · Phone:  · Fax:    Dialysis Facility (if applicable)   · Name:  · Address:  · Dialysis Schedule:  · Phone:  · Fax:    / signature: {Esignature:[id number]:::0}    PHYSICIAN SECTION    Prognosis: Poor    Condition at Discharge: Terminal    Rehab Potential (if transferring to Rehab): Poor    Recommended Labs or Other Treatments After Discharge: none    Physician Certification: I certify the above information and transfer of Haroon Kelly  is necessary for the continuing treatment of the diagnosis listed and that she requires Hospice for greater 30 days.      Update Admission H&P: No change in H&P    PHYSICIAN SIGNATURE:  Electronically signed by Pao Montana MD on 10/7/21 at 7:37 AM EDT

## 2021-10-07 NOTE — PROGRESS NOTES
Patient discharged to inpatient hospice care. [facility] RN called with report to facility. Patient was taken via medical transport with one patient belonging bag.

## 2021-10-07 NOTE — DISCHARGE SUMMARY
10/04/2021    HCT 34.9 10/04/2021     10/04/2021       Renal:    Lab Results   Component Value Date     10/05/2021    K 3.2 10/05/2021    K 3.7 09/28/2021     10/05/2021    CO2 27 10/05/2021    BUN 9 10/05/2021    CREATININE 0.7 10/05/2021    CALCIUM 7.8 10/05/2021    PHOS 2.0 07/29/2015         Significant Diagnostic Studies    Radiology:   XR ELBOW LEFT (2 VIEWS)   Final Result   No acute fracture, dislocation, or sign of joint effusion. XR WRIST LEFT (2 VIEWS)   Final Result   No acute fracture or dislocation at the left wrist.         XR CHEST PORTABLE   Final Result   Strandy opacity at the left base there are present subsegmental atelectasis   rather than infiltrate. The remaining lungs are stable.                 Consults:     IP CONSULT TO HOSPITALIST  IP CONSULT TO PHARMACY  IP CONSULT TO CASE MANAGEMENT  IP CONSULT TO DIETITIAN  IP CONSULT TO PALLIATIVE CARE  IP CONSULT TO NEPHROLOGY  IP CONSULT TO HOSPICE    Disposition:  hospice     Condition at Discharge: Stable    Discharge Instructions/Follow-up:  none    Code Status:  Limited     Activity: activity as tolerated    Diet: regular diet      Discharge Medications:     Current Discharge Medication List           Details   amLODIPine (NORVASC) 10 MG tablet Take 1 tablet by mouth daily  Qty: 30 tablet, Refills: 3      polyethylene glycol (GLYCOLAX) 17 g packet Take 17 g by mouth daily as needed for Constipation  Qty: 527 g, Refills: 1              Details   minoxidil (LONITEN) 2.5 MG tablet Take 2 tablets by mouth daily  Qty: 30 tablet, Refills: 3              Details   metoprolol succinate (TOPROL XL) 25 MG extended release tablet Take 25 mg by mouth daily      melatonin 5 MG TBDP disintegrating tablet Take 1 tablet by mouth nightly as needed (insomnia)  Qty: 15 tablet, Refills: 0      brimonidine (ALPHAGAN P) 0.15 % ophthalmic solution Place 1 drop into both eyes 2 times daily Med listed as \"Brimon 0.15%\" on Pt's home meds list.

## 2021-10-21 PROBLEM — N39.0 UTI (URINARY TRACT INFECTION): Status: RESOLVED | Noted: 2021-09-21 | Resolved: 2021-10-21

## 2022-10-14 NOTE — PROGRESS NOTES
Medicare Wellness Visit  Plan for Preventive Care    A good way for you to stay healthy is to use preventive care.  Medicare covers many services that can help you stay healthy.* The goal of these services is to find any health problems as quickly as possible. Finding problems early can help make them easier to treat.  Your personal plan below lists the services you may need and when they are due.      Health Maintenance Summary     Medicare Advantage- Medicare Wellness Visit (Yearly - January to December)  Overdue since 1/1/2022    Depression Screening (Yearly)  Next due on 10/12/2023    Colorectal Cancer Risk - Colonoscopy (Every 5 Years)  Next due on 12/8/2025    DTaP/Tdap/Td Vaccine (3 - Td or Tdap)  Next due on 11/29/2031    Hepatitis B Vaccine   Completed    Abdominal Aortic Aneurysm (AAA) Screening   Completed    Pneumococcal Vaccine 65+   Completed    Hepatitis C Screening   Completed    Shingles Vaccine   Completed    Influenza Vaccine   Completed    COVID-19 Vaccine   Completed    Meningococcal Vaccine   Aged Out    HPV Vaccine   Aged Out           Preventive Care for Women and Men    Heart Screenings (Cardiovascular):  · Blood tests are used to check your cholesterol, lipid and triglyceride levels. High levels can increase your risk for heart disease and stroke. High levels can be treated with medications, diet and exercise. Lowering your levels can help keep your heart and blood vessels healthy.  Your provider will order these tests if they are needed.    · An ultrasound is done to see if you have an abdominal aortic aneurysm (AAA).  This is an enlargement of one of the main blood vessels that delivers blood to the body.   In the [place], 9,000 deaths are caused by AAA.  You may not even know you have this problem and as many as 1 in 3 people will have a serious problem if it is not treated.  Early diagnosis allows for more effective treatment and cure.  If you have a family history of AAA or are  Perfect serve to Dr. Cookie Coats at 2117:    \"Pt here for hypernatremia. Is refusing to open eyes for meds this evening. BP currently 191/74 and pt has scheduled oral metoprolol but will not take. Can give PRN IV labetalol for this but will not take any other meds. The two oral meds she will not take are tambocor and minoxidil. Not sure if these come in IV form that can be ordered? \"    MD Response: \"IV labetalol is appropriate to use. The other two medications do not have IV formulations. \" a male age 65-75 who has smoked, you are at higher risk of an AAA.  Your provider can order this test, if needed.    Colorectal Screening:  · There are many tests that are used to check for cancer of your colon and rectum. You and your provider should discuss what test is best for you and when to have it done.  Options include:  · Screening Colonoscopy: exam of the entire colon, seen through a flexible lighted tube.  · Flexible Sigmoidoscopy: exam of the last third (sigmoid portion) of the colon and rectum, seen through a flexible lighted tube.  · Cologuard DNA stool test: a sample of your stool is used to screen for cancer and unseen blood in your stool.  · Fecal Occult Blood Test: a sample of your stool is studied to find any unseen blood    Flu Shot:  · An immunization that helps to prevent influenza (the flu). You should get this every year. The best time to get the shot is in the fall.    Pneumococcal Shot:  • Vaccines help prevent pneumococcal disease, which is any type of illness caused by Streptococcus pneumoniae bacteria. There are two kinds of pneumococcal vaccines available in the [place]:   o Pneumococcal conjugate vaccines (PCV20 or Tjcbnxy93®)  o Pneumococcal polysaccharide vaccine (PPSV23 or Viereovmj77®)  · For those who have never received any pneumococcal conjugate vaccine, CDC recommends PVC20 for adults 65 years or older and adults 19 through 64 years old with certain medical conditions or risk factors.   · For those who have previously received PCV13, this should be followed by a dose of PPSV23.     Hepatitis B Shot:  · An immunization that helps to protect people from getting Hepatitis B. Hepatitis B is a virus that spreads through contact with infected blood or body fluids. Many people with the virus do not have symptoms.  The virus can lead to serious problems, such as liver disease. Some people are at higher risk than others. Your doctor will tell you if you need this shot.      Diabetes Screening:  · A test to measure sugar (glucose) in your blood is called a fasting blood sugar. Fasting means you cannot have food or drink for at least 8 hours before the test. This test can detect diabetes long before you may notice symptoms.    Glaucoma Screening:  · Glaucoma screening is performed by your eye doctor. The test measures the fluid pressure inside your eyes to determine if you have glaucoma.     Hepatitis C Screening:  · A blood test to see if you have the hepatitis C virus.  Hepatitis C attacks the liver and is a major cause of chronic liver disease.  Medicare will cover a single screening for all adults born between 1945 & 1965, or high risk patients (people who have injected illegal drugs or people who have had blood transfusions).  High risk patients who continue to inject illegal drugs can be screened for Hepatitis C every year.    Smoking and Tobacco-Use Cessation Counseling:  · Tobacco is the single greatest cause of disease and early death in our country today. Medication and counseling together can increase a person’s chance of quitting for good.   · Medicare covers two quitting attempts per year, with four counseling sessions per attempt (eight sessions in a 12 month period)    Preventive Screening tests for Women    Screening Mammograms and Breast Exams:  · An x-ray of your breasts to check for breast cancer before you or your doctor may be able to feel it.  If breast cancer is found early it can usually be treated with success.    Pelvic Exams and Pap Tests:  · An exam to check for cervical and vaginal cancer. A Pap test is a lab test in which cells are taken from your cervix and sent to the lab to look for signs of cervical cancer. If cancer of the cervix is found early, chances for a cure are good. Testing can generally end at age 65, or if a woman has a hysterectomy for a benign condition. Your provider may recommend more frequent testing if certain abnormal results are  found.    Bone Mass Measurements:  · A painless x-ray of your bone density to see if you are at risk for a broken bone. Bone density refers to the thickness of bones or how tightly the bone tissue is packed.    Preventive Screening tests for Men    Prostate Screening:  · Should you have a prostate cancer test (PSA)?  It is up to you to decide if you want a prostate cancer test. Talk to your clinician to find out if the test is right for you.  Things for you to consider and talk about should include:  · Benefits and harms of the test  · Your family history  · How your race/ethnicity may influence the test  · If the test may impact other medical conditions you have  · Your values on screenings and treatments    *Medicare pays for many preventive services to keep you healthy. For some of these services, you might have to pay a deductible, coinsurance, and / or copayment.  The amounts vary depending on the type of services you need and the kind of Medicare health plan you have.    For further details on screenings offered by Medicare please visit: https://www.medicare.gov/coverage/preventive-screening-services

## (undated) DEVICE — SUTURE MCRYL + SZ 4-0 L18IN ABSRB UD L19MM PS-2 3/8 CIR MCP496G

## (undated) DEVICE — 3M™ COBAN™ NL STERILE NON-LATEX SELF-ADHERENT WRAP, 2084S, 4 IN X 5 YD (10 CM X 4,5 M), 18 ROLLS/CASE: Brand: 3M™ COBAN™

## (undated) DEVICE — ELECTRODE PT RET AD L9FT HI MOIST COND ADH HYDRGEL CORDED

## (undated) DEVICE — SUTURE VCRL + SZ 0 L27IN ABSRB UD L36MM CT-1 1/2 CIR VCPP41D

## (undated) DEVICE — COTTON UNDERCAST PADDING,CRIMPED FINISH: Brand: WEBRIL

## (undated) DEVICE — SOLUTION,SALINE,IRRGATION,500ML,STRL: Brand: MEDLINE

## (undated) DEVICE — MAT TRACK 40 X 72 IN ABSORBENT

## (undated) DEVICE — SYSTEM SKIN CLSR 22CM DERMBND PRINEO

## (undated) DEVICE — CIRCUIT ANES L72IN 3L BACT AND VIR FLTR EL CONN SGL LIMB

## (undated) DEVICE — YANKAUER,BULB TIP,W/O VENT,RIGID,STERILE: Brand: MEDLINE

## (undated) DEVICE — Z DISCONTINUED PER MEDLINE USE 2752023 DRESSING FOAM W7.62XL7.62CM SIL ADH WTRPRF FLM BK SQ SHP

## (undated) DEVICE — GLOVE ORANGE PI 7   MSG9070

## (undated) DEVICE — CANNULATED DRILL

## (undated) DEVICE — SMARTGOWN SURGICAL GOWN, XL: Brand: CONVERTORS

## (undated) DEVICE — 3M™ TEGADERM™ TRANSPARENT FILM DRESSING FRAME STYLE, 1626W, 4 IN X 4-3/4 IN (10 CM X 12 CM), 50/CT 4CT/CASE: Brand: 3M™ TEGADERM™

## (undated) DEVICE — SUTURE ABSORBABLE BRAIDED 2-0 CT-1 27 IN UD VICRYL J259H

## (undated) DEVICE — MAJOR SET UP PK

## (undated) DEVICE — GAUZE,SPONGE,4"X4",8PLY,STRL,LF,10/TRAY: Brand: MEDLINE

## (undated) DEVICE — GLOVE ORANGE PI 7 1/2   MSG9075

## (undated) DEVICE — CUFF RESTRN WR OR ANK BLU FOAM AD

## (undated) DEVICE — PADDING CAST N ADH 12X6 IN CRIMPED FINISH 100% COTTON WBRLII

## (undated) DEVICE — 3M™ STERI-DRAPE™  ISOLATION DRAPE WITH INCISE FILM AND POUCH 1017: Brand: STERI-DRAPE™

## (undated) DEVICE — DRAPE C ARM UNIV W41XL74IN CLR PLAS XR VELC CLSR POLY STRP

## (undated) DEVICE — COVER,MAYO STAND,STERILE: Brand: MEDLINE

## (undated) DEVICE — CHLORAPREP 26ML ORANGE

## (undated) DEVICE — TUBING, SUCTION, 3/16" X 10', STRAIGHT: Brand: MEDLINE

## (undated) DEVICE — DRILL BIT

## (undated) DEVICE — BANDAGE E SELF CLSR 6INX5YD VELC SWIFTWRAP